# Patient Record
Sex: FEMALE | Race: WHITE | NOT HISPANIC OR LATINO | Employment: UNEMPLOYED | URBAN - NONMETROPOLITAN AREA
[De-identification: names, ages, dates, MRNs, and addresses within clinical notes are randomized per-mention and may not be internally consistent; named-entity substitution may affect disease eponyms.]

---

## 2022-07-19 ENCOUNTER — OFFICE VISIT (OUTPATIENT)
Dept: FAMILY MEDICINE CLINIC | Facility: CLINIC | Age: 27
End: 2022-07-19

## 2022-07-19 VITALS
HEIGHT: 56 IN | BODY MASS INDEX: 32.66 KG/M2 | HEART RATE: 87 BPM | DIASTOLIC BLOOD PRESSURE: 77 MMHG | WEIGHT: 145.2 LBS | SYSTOLIC BLOOD PRESSURE: 116 MMHG | TEMPERATURE: 98.4 F | RESPIRATION RATE: 16 BRPM | OXYGEN SATURATION: 99 %

## 2022-07-19 DIAGNOSIS — Z13.220 SCREENING FOR HYPERLIPIDEMIA: ICD-10-CM

## 2022-07-19 DIAGNOSIS — F33.1 MAJOR DEPRESSIVE DISORDER, RECURRENT, MODERATE: ICD-10-CM

## 2022-07-19 DIAGNOSIS — D50.8 OTHER IRON DEFICIENCY ANEMIA: ICD-10-CM

## 2022-07-19 DIAGNOSIS — E55.9 VITAMIN D DEFICIENCY: ICD-10-CM

## 2022-07-19 DIAGNOSIS — Z13.1 SCREENING FOR DIABETES MELLITUS: ICD-10-CM

## 2022-07-19 DIAGNOSIS — E53.8 VITAMIN B12 DEFICIENCY: Primary | ICD-10-CM

## 2022-07-19 DIAGNOSIS — E87.8 DISORDER OF ELECTROLYTES: ICD-10-CM

## 2022-07-19 DIAGNOSIS — Z13.29 SCREENING FOR HYPOTHYROIDISM: ICD-10-CM

## 2022-07-19 PROCEDURE — 99214 OFFICE O/P EST MOD 30 MIN: CPT | Performed by: NURSE PRACTITIONER

## 2022-07-19 NOTE — PROGRESS NOTES
"Chief Complaint  Establish Care (hematoma)    Subjective        Eugenia Le presents to [facility]  History of Present Illness     Approximately 2 months ago the patient got new glasses and her eye care provider advised the patient her nearsightedness had significantly increased, and he recommended she complete diabetic testing. She went to Urgent Care due to insurance coverage to get her blood glucose level checked, which was 85 mg/dL.    She expresses concern for being anemic again, and states she was only anemic when she was pregnant. She states she went to donate plasma on 07/14/2022 at [email or web address] in [place], [place], and is still experiencing arm pain and notes concern for a blown vein. She notes \"passing out\" when she tried to donate plasma after seeing blood coming out of arm, and she also states they couldn't get the needle in her arm on the first attempt and was advised after the second attempt that she had a hematoma.     Eugenia is currently taking Zoloft 50 mg per day for anxiety, which is no longer beneficial for her. She is requesting a dose increase for Zoloft or a different medication for anxiety. Her anxiety has increased. She has been taking Zoloft since 2016, which she the only anxiety medication she has ever been on.     She was born in [place], [place], and has lived in [place], [place] since she was born. She graduated high school in 2014. She has a 4-year-old child and a 5-year-old child.    Objective   Vital Signs:  /77 (BP Location: Right arm, Patient Position: Sitting, Cuff Size: Adult)   Pulse 87   Temp 98.4 °F (36.9 °C) (Temporal)   Resp 16   Ht 142.2 cm (56\")   Wt 65.9 kg (145 lb 3.2 oz)   SpO2 99%   BMI 32.55 kg/m²   Estimated body mass index is 32.55 kg/m² as calculated from the following:    Height as of this encounter: 142.2 cm (56\").    Weight as of this encounter: 65.9 kg (145 lb 3.2 oz).          Physical Exam  Vitals " and nursing note reviewed.   Constitutional:       Appearance: Normal appearance. She is well-developed.   HENT:      Head: Normocephalic and atraumatic.   Eyes:      Conjunctiva/sclera: Conjunctivae normal.      Pupils: Pupils are equal, round, and reactive to light.   Cardiovascular:      Rate and Rhythm: Normal rate and regular rhythm.      Heart sounds: Normal heart sounds.   Pulmonary:      Effort: Pulmonary effort is normal.      Breath sounds: Normal breath sounds.   Abdominal:      General: Bowel sounds are normal.      Palpations: Abdomen is soft.   Musculoskeletal:         General: Normal range of motion.      Cervical back: Normal range of motion and neck supple.   Skin:     General: Skin is warm and dry.   Neurological:      Mental Status: She is alert and oriented to person, place, and time.   Psychiatric:         Behavior: Behavior normal.         Thought Content: Thought content normal.         Judgment: Judgment normal.        Result Review :                Assessment and Plan   Diagnoses and all orders for this visit:    1. Vitamin B12 deficiency (Primary)  Comments:  Vitamin B12 lab work has been ordered and the patient will obtain. She will return in 2 weeks to discuss her lab work results.   Orders:  -     Vitamin B12; Future    2. Vitamin D deficiency  Comments:  Vitamin D lab work has been ordered and the patient will obtain. She will return in 2 weeks to discuss her lab work results.     Orders:  -     Vitamin D 25 Hydroxy; Future    3. Other iron deficiency anemia  Comments:  CBC & differential, ferritin, iron, and TIBC lab work has been ordered and the patient will obtain. She will return in 2 weeks to discuss her lab work results.   Orders:  -     CBC & Differential; Future  -     Ferritin; Future  -     Iron and TIBC; Future    4. Screening for hyperlipidemia  -     Lipid Panel; Future    5. Screening for hypothyroidism  Comments:  TSH and T4 lab work has been ordered and the patient will  obtain. She will return in 2 weeks to discuss her lab work results.   Orders:  -     TSH; Future  -     T4, Free; Future    6. Disorder of electrolytes  Comments:  CMP lab work has been ordered and the patient will obtain. She will return in 2 weeks to discuss her lab work results.     Orders:  -     Comprehensive Metabolic Panel; Future    7. Screening for diabetes mellitus  Comments:  She is not fasting today for lab work. She will return this week for hemoglobin A1c lab work. She will return in 2 weeks to discuss her lab work results.   Orders:  -     Hemoglobin A1c; Future    8. Major depressive disorder, recurrent, moderate (HCC)  -     GeneSight - Swab,; Future    4. Screening for hyperlipidemia   The patient is not fasting to complete a lipid panel today. She is to return for fasting lab work this week. I advised no food or beverages at least 8 hours prior to her lab draw. She will return in 2 weeks to discuss her lab work results.     8. Major depressive disorder, recurrent, moderate   A GeneSight swab has been obtained and sent for testing. I advised the patient it may take 2 weeks to get her GeneSight testing results back. She will follow up in 2 weeks to discuss the results and at that time we will increase her Zoloft or change her medication regimen. In the interim, she will continue Zoloft 50 mg per day.               Follow Up   Return in about 2 weeks (around 8/2/2022) for Recheck.  Patient was given instructions and counseling regarding her condition or for health maintenance advice. Please see specific information pulled into the AVS if appropriate.     Transcribed from ambient dictation for SARITA Cabrera by JUNIE SEXTON.  07/19/22   15:49 EDT    Patient verbalized consent to the visit recording.

## 2022-08-05 DIAGNOSIS — F33.1 MAJOR DEPRESSIVE DISORDER, RECURRENT, MODERATE: ICD-10-CM

## 2022-08-12 ENCOUNTER — TELEPHONE (OUTPATIENT)
Dept: FAMILY MEDICINE CLINIC | Facility: CLINIC | Age: 27
End: 2022-08-12

## 2022-08-12 DIAGNOSIS — E53.8 FOLIC ACID DEFICIENCY: Primary | ICD-10-CM

## 2022-08-12 RX ORDER — LEVOMEFOLATE/ALGAL OIL 15-90.314
1 CAPSULE ORAL DAILY
Qty: 90 CAPSULE | Refills: 0 | Status: SHIPPED | OUTPATIENT
Start: 2022-08-12 | End: 2022-08-29 | Stop reason: SDUPTHER

## 2022-08-12 NOTE — TELEPHONE ENCOUNTER
Caller: Eugenia Le    Relationship: Self    Best call back number: [phone]    What test was performed: GENETIC TESTING/MOUTH SWAB    When was the test performed: 7/19    PLEASE CALL

## 2022-08-29 DIAGNOSIS — E53.8 FOLIC ACID DEFICIENCY: ICD-10-CM

## 2022-08-29 NOTE — TELEPHONE ENCOUNTER
Caller: Eugenia Le    Relationship: Self    Best call back number:[phone]    Requested Prescriptions:   Requested Prescriptions     Pending Prescriptions Disp Refills   • sertraline (ZOLOFT) 50 MG tablet       Sig: Take 1 tablet by mouth Daily.   • L-Methylfolate-Algae (L-Methylfolate Forte) 15-90.314 MG capsule capsule 90 capsule 0     Sig: Take 1 capsule by mouth Daily for 90 days.        Pharmacy where request should be sent: [address] [phone] [facility] [phone] [facility]     Additional details provided by patient: PATIENT WILL NOT BE ABLE TO SEE PRANAY UNTIL 9/22 BECAUSE THAT IS THE FIRST AVAILABLE APPOINTMENT. SHE WOULD ALSO LIKE TO KNOW IF [facility] COULD REFILL HER ANXIETY MEDICATION, EVEN THOUGH PRANAY DIDN'T PRESCRIBE THIS FOR HER.   Does the patient have less than a 3 day supply:  [x] Yes  [] No    Julianne CHACKO Rep   08/29/22 15:09 EDT

## 2022-08-30 RX ORDER — LEVOMEFOLATE/ALGAL OIL 15-90.314
1 CAPSULE ORAL DAILY
Qty: 90 CAPSULE | Refills: 0 | Status: SHIPPED | OUTPATIENT
Start: 2022-08-30 | End: 2022-11-28

## 2022-09-22 ENCOUNTER — OFFICE VISIT (OUTPATIENT)
Dept: FAMILY MEDICINE CLINIC | Facility: CLINIC | Age: 27
End: 2022-09-22

## 2022-09-22 VITALS
TEMPERATURE: 98.2 F | DIASTOLIC BLOOD PRESSURE: 80 MMHG | OXYGEN SATURATION: 98 % | RESPIRATION RATE: 16 BRPM | BODY MASS INDEX: 31.5 KG/M2 | SYSTOLIC BLOOD PRESSURE: 125 MMHG | HEART RATE: 95 BPM | WEIGHT: 146 LBS | HEIGHT: 57 IN

## 2022-09-22 DIAGNOSIS — Z13.1 SCREENING FOR DIABETES MELLITUS: ICD-10-CM

## 2022-09-22 DIAGNOSIS — N64.4 BREAST TENDERNESS IN FEMALE: Primary | ICD-10-CM

## 2022-09-22 DIAGNOSIS — Z13.29 SCREENING FOR HYPOTHYROIDISM: ICD-10-CM

## 2022-09-22 DIAGNOSIS — Z13.220 SCREENING FOR HYPERLIPIDEMIA: ICD-10-CM

## 2022-09-22 DIAGNOSIS — E87.8 DISORDER OF ELECTROLYTES: ICD-10-CM

## 2022-09-22 DIAGNOSIS — E55.9 VITAMIN D DEFICIENCY: ICD-10-CM

## 2022-09-22 DIAGNOSIS — D50.8 OTHER IRON DEFICIENCY ANEMIA: ICD-10-CM

## 2022-09-22 DIAGNOSIS — E53.8 VITAMIN B12 DEFICIENCY: ICD-10-CM

## 2022-09-22 LAB — HBA1C MFR BLD: 5 % (ref 3.5–5.6)

## 2022-09-22 PROCEDURE — 36415 COLL VENOUS BLD VENIPUNCTURE: CPT | Performed by: NURSE PRACTITIONER

## 2022-09-22 PROCEDURE — 84466 ASSAY OF TRANSFERRIN: CPT | Performed by: NURSE PRACTITIONER

## 2022-09-22 PROCEDURE — 83036 HEMOGLOBIN GLYCOSYLATED A1C: CPT | Performed by: NURSE PRACTITIONER

## 2022-09-22 PROCEDURE — 83540 ASSAY OF IRON: CPT | Performed by: NURSE PRACTITIONER

## 2022-09-22 PROCEDURE — 82728 ASSAY OF FERRITIN: CPT | Performed by: NURSE PRACTITIONER

## 2022-09-22 PROCEDURE — 82607 VITAMIN B-12: CPT | Performed by: NURSE PRACTITIONER

## 2022-09-22 PROCEDURE — 80050 GENERAL HEALTH PANEL: CPT | Performed by: NURSE PRACTITIONER

## 2022-09-22 PROCEDURE — 80061 LIPID PANEL: CPT | Performed by: NURSE PRACTITIONER

## 2022-09-22 PROCEDURE — 82306 VITAMIN D 25 HYDROXY: CPT | Performed by: NURSE PRACTITIONER

## 2022-09-22 PROCEDURE — 84439 ASSAY OF FREE THYROXINE: CPT | Performed by: NURSE PRACTITIONER

## 2022-09-22 RX ORDER — SERTRALINE HYDROCHLORIDE 100 MG/1
100 TABLET, FILM COATED ORAL DAILY
Qty: 90 TABLET | Refills: 0 | Status: SHIPPED | OUTPATIENT
Start: 2022-09-22 | End: 2023-01-16 | Stop reason: SDUPTHER

## 2022-09-23 LAB
25(OH)D3 SERPL-MCNC: 28.3 NG/ML (ref 30–100)
ALBUMIN SERPL-MCNC: 4.6 G/DL (ref 3.5–5.2)
ALBUMIN/GLOB SERPL: 1.4 G/DL
ALP SERPL-CCNC: 83 U/L (ref 39–117)
ALT SERPL W P-5'-P-CCNC: 14 U/L (ref 1–33)
ANION GAP SERPL CALCULATED.3IONS-SCNC: 12.5 MMOL/L (ref 5–15)
AST SERPL-CCNC: 19 U/L (ref 1–32)
BASOPHILS # BLD AUTO: 0.04 10*3/MM3 (ref 0–0.2)
BASOPHILS NFR BLD AUTO: 0.4 % (ref 0–1.5)
BILIRUB SERPL-MCNC: 0.2 MG/DL (ref 0–1.2)
BUN SERPL-MCNC: 6 MG/DL (ref 6–20)
BUN/CREAT SERPL: 7.6 (ref 7–25)
CALCIUM SPEC-SCNC: 9.8 MG/DL (ref 8.6–10.5)
CHLORIDE SERPL-SCNC: 101 MMOL/L (ref 98–107)
CHOLEST SERPL-MCNC: 170 MG/DL (ref 0–200)
CO2 SERPL-SCNC: 22.5 MMOL/L (ref 22–29)
CREAT SERPL-MCNC: 0.79 MG/DL (ref 0.57–1)
DEPRECATED RDW RBC AUTO: 39.1 FL (ref 37–54)
EGFRCR SERPLBLD CKD-EPI 2021: 105.3 ML/MIN/1.73
EOSINOPHIL # BLD AUTO: 0.06 10*3/MM3 (ref 0–0.4)
EOSINOPHIL NFR BLD AUTO: 0.6 % (ref 0.3–6.2)
ERYTHROCYTE [DISTWIDTH] IN BLOOD BY AUTOMATED COUNT: 12.4 % (ref 12.3–15.4)
FERRITIN SERPL-MCNC: 26 NG/ML (ref 13–150)
GLOBULIN UR ELPH-MCNC: 3.4 GM/DL
GLUCOSE SERPL-MCNC: 87 MG/DL (ref 65–99)
HCT VFR BLD AUTO: 39.1 % (ref 34–46.6)
HDLC SERPL-MCNC: 56 MG/DL (ref 40–60)
HGB BLD-MCNC: 12.7 G/DL (ref 12–15.9)
IMM GRANULOCYTES # BLD AUTO: 0.03 10*3/MM3 (ref 0–0.05)
IMM GRANULOCYTES NFR BLD AUTO: 0.3 % (ref 0–0.5)
IRON 24H UR-MRATE: 44 MCG/DL (ref 37–145)
IRON SATN MFR SERPL: 10 % (ref 20–50)
LDLC SERPL CALC-MCNC: 105 MG/DL (ref 0–100)
LDLC/HDLC SERPL: 1.88 {RATIO}
LYMPHOCYTES # BLD AUTO: 1.84 10*3/MM3 (ref 0.7–3.1)
LYMPHOCYTES NFR BLD AUTO: 19.1 % (ref 19.6–45.3)
MCH RBC QN AUTO: 27.9 PG (ref 26.6–33)
MCHC RBC AUTO-ENTMCNC: 32.5 G/DL (ref 31.5–35.7)
MCV RBC AUTO: 85.9 FL (ref 79–97)
MONOCYTES # BLD AUTO: 0.53 10*3/MM3 (ref 0.1–0.9)
MONOCYTES NFR BLD AUTO: 5.5 % (ref 5–12)
NEUTROPHILS NFR BLD AUTO: 7.15 10*3/MM3 (ref 1.7–7)
NEUTROPHILS NFR BLD AUTO: 74.1 % (ref 42.7–76)
NRBC BLD AUTO-RTO: 0 /100 WBC (ref 0–0.2)
PLATELET # BLD AUTO: 302 10*3/MM3 (ref 140–450)
PMV BLD AUTO: 10.7 FL (ref 6–12)
POTASSIUM SERPL-SCNC: 3.6 MMOL/L (ref 3.5–5.2)
PROT SERPL-MCNC: 8 G/DL (ref 6–8.5)
RBC # BLD AUTO: 4.55 10*6/MM3 (ref 3.77–5.28)
SODIUM SERPL-SCNC: 136 MMOL/L (ref 136–145)
T4 FREE SERPL-MCNC: 1.1 NG/DL (ref 0.93–1.7)
TIBC SERPL-MCNC: 425 MCG/DL (ref 298–536)
TRANSFERRIN SERPL-MCNC: 285 MG/DL (ref 200–360)
TRIGL SERPL-MCNC: 43 MG/DL (ref 0–150)
TSH SERPL DL<=0.05 MIU/L-ACNC: 1.58 UIU/ML (ref 0.27–4.2)
VIT B12 BLD-MCNC: 622 PG/ML (ref 211–946)
VLDLC SERPL-MCNC: 9 MG/DL (ref 5–40)
WBC NRBC COR # BLD: 9.65 10*3/MM3 (ref 3.4–10.8)

## 2022-09-26 ENCOUNTER — TELEPHONE (OUTPATIENT)
Dept: FAMILY MEDICINE CLINIC | Facility: CLINIC | Age: 27
End: 2022-09-26

## 2022-09-26 DIAGNOSIS — R79.0 ABNORMAL IRON SATURATION: Primary | ICD-10-CM

## 2022-09-26 RX ORDER — ERGOCALCIFEROL 1.25 MG/1
50000 CAPSULE ORAL
Qty: 8 CAPSULE | Refills: 0 | Status: SHIPPED | OUTPATIENT
Start: 2022-09-26 | End: 2022-11-03

## 2022-09-26 NOTE — TELEPHONE ENCOUNTER
Caller: Eugenia Le    Relationship: Self    Best call back number: [phone]     What is the best time to reach you: YES     Who are you requesting to speak with (clinical staff, provider,  specific staff member):CLINICAL    Do you know the name of the person who called: EUGENIA     What was the call regarding: EUGENIA RECEIVED NOTIFICATION THAT VITAMIN D WAS READY TO BE PICKED UP AT PHARMACY, SHE IS WANTING TO KNOW IF SOMETHING SHOWED UP IN HER LABS WERE DONE ON 9/22/2022    Do you require a callback: YES

## 2022-10-12 NOTE — PROGRESS NOTES
HEMATOLOGY ONCOLOGY OUTPATIENT CONSULTATION       Patient name: Eugenia Le  : 1995  MRN: [id number]  Primary Care Physician: Karen Bridges APRN  Referring Physician: Karen Bridges AP*  Reason For Consult: Latent iron deficiency      History of Present Illness:    Patient is a 27-year-old female with no significant past medical history who was seen by her primary care for routine visit.  This showed low iron saturation patient was referred for further work-up and treatment.    Review of past labs  2022 -WBC 6.8, hemoglobin 13.4, hematocrit 40.2, platelet 338  2022 -WBC 9.65, hemoglobin 12.7, hematocrit 39.1, platelet 302  Vitamin B12 level normal, ferritin 26, iron saturation 10, TIBC 425    Subjective:  Patient presents for initial consultation.  She complains of ongoing fatigue.  She denies any GI bleeding no blood in stools or black stools.  She has menorrhagia which is likely the cause of her iron deficiency.    Past Medical History:   Diagnosis Date   • Anxiety        Past Surgical History:   Procedure Laterality Date   •  SECTION  2018   • TEETH EXTRACTION  2019    All top teeth removed- wears dentures   • TUBAL ABDOMINAL LIGATION  2018         Current Outpatient Medications:   •  L-Methylfolate-Algae (L-Methylfolate Forte) 15-90.314 MG capsule capsule, Take 1 capsule by mouth Daily for 90 days., Disp: 90 capsule, Rfl: 0  •  sertraline (Zoloft) 100 MG tablet, Take 1 tablet by mouth Daily., Disp: 90 tablet, Rfl: 0  •  vitamin D (ERGOCALCIFEROL) 1.25 MG (57095 UT) capsule capsule, Take 1 capsule by mouth Every 7 (Seven) Days., Disp: 8 capsule, Rfl: 0    No Known Allergies    Family History   Problem Relation Age of Onset   • Anxiety disorder Mother    • Heart disease Father    • Hypertension Father    • Diverticulitis Father    • Autism Sister    • No Known Problems Daughter    • No Known Problems Son        Cancer-related family history is not on  "file.      Social History     Tobacco Use   • Smoking status: Former     Packs/day: 0.25     Years: 1.00     Pack years: 0.25     Types: Cigarettes     Start date: 2014     Quit date: 2014     Years since quittin.8   • Smokeless tobacco: Never   Vaping Use   • Vaping Use: Never used   Substance Use Topics   • Alcohol use: Not Currently   • Drug use: Never     Social History     Social History Narrative   • Not on file       ROS:   Review of Systems   Constitutional: Positive for fatigue. Negative for fever.   HENT: Negative for congestion and nosebleeds.    Eyes: Negative for pain.   Respiratory: Negative for cough and shortness of breath.    Cardiovascular: Negative for chest pain.   Gastrointestinal: Negative for abdominal pain, blood in stool, diarrhea, nausea and vomiting.   Endocrine: Negative for cold intolerance and heat intolerance.   Genitourinary: Negative for difficulty urinating.   Musculoskeletal: Negative for arthralgias.   Skin: Negative for rash.   Neurological: Negative for dizziness and headaches.   Hematological: Does not bruise/bleed easily.   Psychiatric/Behavioral: Negative for behavioral problems.       Objective:    Vital Signs:  Vitals:    10/13/22 1414   BP: 120/82   Pulse: 78   Resp: 18   Temp: 97.4 °F (36.3 °C)   SpO2: 98%   Weight: 66.9 kg (147 lb 6.4 oz)   Height: 144.8 cm (57\")   PainSc: 0-No pain     Body mass index is 31.9 kg/m².    ECOG  (0) Fully active, able to carry on all predisease performance without restriction    Physical Exam:   Physical Exam  Constitutional:       Appearance: Normal appearance.   HENT:      Head: Normocephalic and atraumatic.   Eyes:      Pupils: Pupils are equal, round, and reactive to light.   Cardiovascular:      Rate and Rhythm: Normal rate and regular rhythm.      Pulses: Normal pulses.      Heart sounds: No murmur heard.  Pulmonary:      Effort: Pulmonary effort is normal.      Breath sounds: Normal breath sounds.   Abdominal:      General: " There is no distension.      Palpations: Abdomen is soft. There is no mass.      Tenderness: There is no abdominal tenderness.   Musculoskeletal:         General: Normal range of motion.      Cervical back: Normal range of motion.   Skin:     General: Skin is warm.   Neurological:      General: No focal deficit present.      Mental Status: She is alert.   Psychiatric:         Mood and Affect: Mood normal.       Lab Results - Last 18 Months   Lab Units 09/22/22  1340 06/24/22  1354   WBC 10*3/mm3 9.65 6.8   HEMOGLOBIN g/dL 12.7 13.4   HEMATOCRIT % 39.1 40.2   PLATELETS 10*3/mm3 302 338   MCV fL 85.9 87     Lab Results - Last 18 Months   Lab Units 09/22/22  1340 06/24/22  1354   SODIUM mmol/L 136 140   POTASSIUM mmol/L 3.6 4.1   CHLORIDE mmol/L 101 102   TOTAL CO2 mmol/L  --  22   CO2 mmol/L 22.5  --    BUN mg/dL 6 6   CREATININE mg/dL 0.79 0.91   CALCIUM mg/dL 9.8 9.8   BILIRUBIN mg/dL 0.2 0.3   ALK PHOS U/L 83 93   ALT (SGPT) U/L 14 13   AST (SGOT) U/L 19 23   GLUCOSE mg/dL 87 85       Lab Results   Component Value Date    GLUCOSE 87 09/22/2022    BUN 6 09/22/2022    CREATININE 0.79 09/22/2022    BCR 7.6 09/22/2022    K 3.6 09/22/2022    CO2 22.5 09/22/2022    CALCIUM 9.8 09/22/2022    PROTENTOTREF 8.2 06/24/2022    ALBUMIN 4.60 09/22/2022    LABIL2 1.5 06/24/2022    AST 19 09/22/2022    ALT 14 09/22/2022       No results for input(s): APTT, INR, PTT in the last 16992 hours.    Lab Results   Component Value Date    IRON 44 09/22/2022    TIBC 425 09/22/2022    FERRITIN 26.00 09/22/2022       No results found for: FOLATE    No results found for: OCCULTBLD    No results found for: RETICCTPCT  Lab Results   Component Value Date    ARWUTJPE84 622 09/22/2022     No results found for: SPEP, UPEP  No results found for: LDH, URICACID  No results found for: MICHAEL, RF, SEDRATE  No results found for: FIBRINOGEN, HAPTOGLOBIN  No results found for: PTT, INR  No results found for:   No results found for: CEA  No components  found for: CA-19-9  No results found for: PSA  No results found for: SEDRATE       Assessment & Plan     Patient is a 27-year-old female with latent iron deficiency    Latent iron deficiency  Patient's hemoglobin is normal at 12.7, iron saturation is low at 9 this goes along with latent iron deficiency not causing anemia yet  Etiology here is likely menorrhagia.  I will start with supplementation oral iron 325 mg daily  I will recheck her CBC and iron studies in 3 months if there is an improvement we will continue oral iron if no significant improvement or worsening is noted we can consider iron infusion.      Thank you very much for providing the opportunity to participate in this patient’s care. Please do not hesitate to call if there are any other questions.

## 2022-10-13 ENCOUNTER — CONSULT (OUTPATIENT)
Dept: ONCOLOGY | Facility: CLINIC | Age: 27
End: 2022-10-13

## 2022-10-13 VITALS
WEIGHT: 147.4 LBS | RESPIRATION RATE: 18 BRPM | SYSTOLIC BLOOD PRESSURE: 120 MMHG | HEART RATE: 78 BPM | TEMPERATURE: 97.4 F | BODY MASS INDEX: 31.8 KG/M2 | OXYGEN SATURATION: 98 % | HEIGHT: 57 IN | DIASTOLIC BLOOD PRESSURE: 82 MMHG

## 2022-10-13 DIAGNOSIS — D50.9 IRON DEFICIENCY ANEMIA, UNSPECIFIED IRON DEFICIENCY ANEMIA TYPE: Primary | ICD-10-CM

## 2022-10-13 PROCEDURE — 99204 OFFICE O/P NEW MOD 45 MIN: CPT | Performed by: INTERNAL MEDICINE

## 2022-10-13 RX ORDER — FERROUS GLUCONATE 324(37.5)
324 TABLET ORAL
Qty: 30 TABLET | Refills: 5 | Status: SHIPPED | OUTPATIENT
Start: 2022-10-13 | End: 2023-01-30 | Stop reason: SDUPTHER

## 2022-11-03 ENCOUNTER — OFFICE VISIT (OUTPATIENT)
Dept: FAMILY MEDICINE CLINIC | Facility: CLINIC | Age: 27
End: 2022-11-03

## 2022-11-03 VITALS
SYSTOLIC BLOOD PRESSURE: 128 MMHG | TEMPERATURE: 98.7 F | BODY MASS INDEX: 30.76 KG/M2 | WEIGHT: 142.6 LBS | HEIGHT: 57 IN | HEART RATE: 98 BPM | DIASTOLIC BLOOD PRESSURE: 89 MMHG | OXYGEN SATURATION: 98 %

## 2022-11-03 DIAGNOSIS — F41.9 ANXIETY: ICD-10-CM

## 2022-11-03 DIAGNOSIS — K21.00 GASTROESOPHAGEAL REFLUX DISEASE WITH ESOPHAGITIS WITHOUT HEMORRHAGE: ICD-10-CM

## 2022-11-03 DIAGNOSIS — R10.30 DISCOMFORT OF GROIN, UNSPECIFIED LATERALITY: ICD-10-CM

## 2022-11-03 DIAGNOSIS — K62.5 RECTAL BLEEDING: ICD-10-CM

## 2022-11-03 DIAGNOSIS — E53.8 VITAMIN B12 DEFICIENCY: ICD-10-CM

## 2022-11-03 DIAGNOSIS — F42.9 OBSESSIVE-COMPULSIVE DISORDER, UNSPECIFIED TYPE: ICD-10-CM

## 2022-11-03 DIAGNOSIS — B37.9 YEAST INFECTION: ICD-10-CM

## 2022-11-03 DIAGNOSIS — K59.00 CONSTIPATION, UNSPECIFIED CONSTIPATION TYPE: ICD-10-CM

## 2022-11-03 DIAGNOSIS — D50.9 IRON DEFICIENCY ANEMIA, UNSPECIFIED IRON DEFICIENCY ANEMIA TYPE: ICD-10-CM

## 2022-11-03 DIAGNOSIS — R06.89 DYSPNEA AND RESPIRATORY ABNORMALITIES: ICD-10-CM

## 2022-11-03 DIAGNOSIS — E55.9 VITAMIN D DEFICIENCY: ICD-10-CM

## 2022-11-03 DIAGNOSIS — R53.83 OTHER FATIGUE: ICD-10-CM

## 2022-11-03 DIAGNOSIS — R10.9 FLANK PAIN: ICD-10-CM

## 2022-11-03 DIAGNOSIS — I73.9 INTERMITTENT CLAUDICATION: Primary | ICD-10-CM

## 2022-11-03 DIAGNOSIS — R06.00 DYSPNEA AND RESPIRATORY ABNORMALITIES: ICD-10-CM

## 2022-11-03 DIAGNOSIS — G43.001 MIGRAINE WITHOUT AURA AND WITH STATUS MIGRAINOSUS, NOT INTRACTABLE: ICD-10-CM

## 2022-11-03 DIAGNOSIS — Z23 ENCOUNTER FOR IMMUNIZATION: ICD-10-CM

## 2022-11-03 DIAGNOSIS — L98.8 SKIN LESION OF BREAST: ICD-10-CM

## 2022-11-03 LAB
BASOPHILS # BLD AUTO: 0 10*3/MM3 (ref 0–0.2)
BASOPHILS NFR BLD AUTO: 0.5 % (ref 0–1.5)
DEPRECATED RDW RBC AUTO: 45.9 FL (ref 37–54)
EOSINOPHIL # BLD AUTO: 0.1 10*3/MM3 (ref 0–0.4)
EOSINOPHIL NFR BLD AUTO: 2.3 % (ref 0.3–6.2)
ERYTHROCYTE [DISTWIDTH] IN BLOOD BY AUTOMATED COUNT: 14.7 % (ref 12.3–15.4)
FERRITIN SERPL-MCNC: 35.05 NG/ML (ref 13–150)
HCT VFR BLD AUTO: 40.6 % (ref 34–46.6)
HGB BLD-MCNC: 13.1 G/DL (ref 12–15.9)
INR PPP: 4.48 (ref 0.93–1.1)
IRON 24H UR-MRATE: 74 MCG/DL (ref 37–145)
IRON SATN MFR SERPL: 18 % (ref 20–50)
LYMPHOCYTES # BLD AUTO: 1.8 10*3/MM3 (ref 0.7–3.1)
LYMPHOCYTES NFR BLD AUTO: 27.3 % (ref 19.6–45.3)
MCH RBC QN AUTO: 29.2 PG (ref 26.6–33)
MCHC RBC AUTO-ENTMCNC: 32.2 G/DL (ref 31.5–35.7)
MCV RBC AUTO: 90.8 FL (ref 79–97)
MONOCYTES # BLD AUTO: 0.5 10*3/MM3 (ref 0.1–0.9)
MONOCYTES NFR BLD AUTO: 7.5 % (ref 5–12)
NEUTROPHILS NFR BLD AUTO: 4.1 10*3/MM3 (ref 1.7–7)
NEUTROPHILS NFR BLD AUTO: 62.4 % (ref 42.7–76)
NRBC BLD AUTO-RTO: 0.1 /100 WBC (ref 0–0.2)
PLATELET # BLD AUTO: 325 10*3/MM3 (ref 140–450)
PMV BLD AUTO: 8.3 FL (ref 6–12)
PROTHROMBIN TIME: 42.4 SECONDS (ref 9.6–11.7)
RBC # BLD AUTO: 4.46 10*6/MM3 (ref 3.77–5.28)
TIBC SERPL-MCNC: 417 MCG/DL (ref 298–536)
TRANSFERRIN SERPL-MCNC: 280 MG/DL (ref 200–360)
WBC NRBC COR # BLD: 6.6 10*3/MM3 (ref 3.4–10.8)

## 2022-11-03 PROCEDURE — T1015 CLINIC SERVICE: HCPCS | Performed by: NURSE PRACTITIONER

## 2022-11-03 PROCEDURE — 99214 OFFICE O/P EST MOD 30 MIN: CPT | Performed by: NURSE PRACTITIONER

## 2022-11-03 PROCEDURE — 82607 VITAMIN B-12: CPT | Performed by: NURSE PRACTITIONER

## 2022-11-03 PROCEDURE — 84439 ASSAY OF FREE THYROXINE: CPT | Performed by: NURSE PRACTITIONER

## 2022-11-03 PROCEDURE — 90471 IMMUNIZATION ADMIN: CPT | Performed by: NURSE PRACTITIONER

## 2022-11-03 PROCEDURE — 84466 ASSAY OF TRANSFERRIN: CPT | Performed by: INTERNAL MEDICINE

## 2022-11-03 PROCEDURE — 82728 ASSAY OF FERRITIN: CPT | Performed by: INTERNAL MEDICINE

## 2022-11-03 PROCEDURE — 80050 GENERAL HEALTH PANEL: CPT | Performed by: INTERNAL MEDICINE

## 2022-11-03 PROCEDURE — 85610 PROTHROMBIN TIME: CPT | Performed by: NURSE PRACTITIONER

## 2022-11-03 PROCEDURE — 83540 ASSAY OF IRON: CPT | Performed by: INTERNAL MEDICINE

## 2022-11-03 PROCEDURE — 90686 IIV4 VACC NO PRSV 0.5 ML IM: CPT | Performed by: NURSE PRACTITIONER

## 2022-11-03 PROCEDURE — 82306 VITAMIN D 25 HYDROXY: CPT | Performed by: NURSE PRACTITIONER

## 2022-11-03 RX ORDER — OMEPRAZOLE 40 MG/1
40 CAPSULE, DELAYED RELEASE ORAL DAILY
Qty: 90 CAPSULE | Refills: 0 | Status: SHIPPED | OUTPATIENT
Start: 2022-11-03 | End: 2023-01-30 | Stop reason: SDUPTHER

## 2022-11-03 RX ORDER — BUSPIRONE HYDROCHLORIDE 5 MG/1
5 TABLET ORAL NIGHTLY
Qty: 30 TABLET | Refills: 0 | Status: SHIPPED | OUTPATIENT
Start: 2022-11-03 | End: 2022-12-20 | Stop reason: SDUPTHER

## 2022-11-03 RX ORDER — ERGOCALCIFEROL 1.25 MG/1
50000 CAPSULE ORAL
Qty: 8 CAPSULE | Refills: 0 | Status: SHIPPED | OUTPATIENT
Start: 2022-11-03 | End: 2023-02-04 | Stop reason: SDUPTHER

## 2022-11-03 RX ORDER — FLUCONAZOLE 150 MG/1
150 TABLET ORAL ONCE
Qty: 1 TABLET | Refills: 0 | Status: SHIPPED | OUTPATIENT
Start: 2022-11-03 | End: 2022-11-03

## 2022-11-03 NOTE — PROGRESS NOTES
"Chief Complaint   Patient presents with   • Med Refill     Wants to discuss her Vit D. Unsure if she needs to refill it or not.   • Bleeding/Bruising     Increased bruising in her legs and has noticed \"knots\" in both shins.   • Shortness of Breath     Cannot climb more than 8 steps without getting winded and needing to sit down.   • Heartburn     Pt states she can \"drink a glass of water and still have heartburn\"   • Hot Flashes   • Migraine     Wants to discuss treatment / imaging         Subjective   Eugenia Le is a 27 y.o. {Race/ethnicity:50153} female who presents today for ***  HPI  Eugenia Le  has a past medical history of Anxiety.    No Known Allergies    Current Outpatient Medications:   •  ferrous gluconate 324 (37.5 Fe) MG tablet tablet, Take 1 tablet by mouth Daily With Breakfast., Disp: 30 tablet, Rfl: 5  •  L-Methylfolate-Algae (L-Methylfolate Forte) 15-90.314 MG capsule capsule, Take 1 capsule by mouth Daily for 90 days., Disp: 90 capsule, Rfl: 0  •  sertraline (Zoloft) 100 MG tablet, Take 1 tablet by mouth Daily., Disp: 90 tablet, Rfl: 0  •  busPIRone (BUSPAR) 5 MG tablet, Take 1 tablet by mouth Every Night., Disp: 30 tablet, Rfl: 0  •  fluconazole (Diflucan) 150 MG tablet, Take 1 tablet by mouth 1 (One) Time for 1 dose., Disp: 1 tablet, Rfl: 0  •  omeprazole (priLOSEC) 40 MG capsule, Take 1 capsule by mouth Daily for 90 days., Disp: 90 capsule, Rfl: 0  •  vitamin D (ERGOCALCIFEROL) 1.25 MG (08454 UT) capsule capsule, Take 1 capsule by mouth Every 7 (Seven) Days., Disp: 8 capsule, Rfl: 0  Past Medical History:   Diagnosis Date   • Anxiety      Past Surgical History:   Procedure Laterality Date   •  SECTION  2018   • TEETH EXTRACTION  2019    All top teeth removed- wears dentures   • TUBAL ABDOMINAL LIGATION  2018     Social History     Socioeconomic History   • Marital status:    Tobacco Use   • Smoking status: Former     Packs/day: 0.25     Years: 1.00     Pack years: 0.25     Types: " Cigarettes     Start date: 2014     Quit date: 2014     Years since quittin.9   • Smokeless tobacco: Never   Vaping Use   • Vaping Use: Never used   Substance and Sexual Activity   • Alcohol use: Not Currently   • Drug use: Never   • Sexual activity: Yes     Birth control/protection: Tubal ligation     Family History   Problem Relation Age of Onset   • Anxiety disorder Mother    • Heart disease Father    • Hypertension Father    • Diverticulitis Father    • Autism Sister    • No Known Problems Daughter    • No Known Problems Son      PHQ-2 Depression Screening  Little interest or pleasure in doing things?     Feeling down, depressed, or hopeless?     PHQ-2 Total Score       PHQ-9 Depression Screening  Little interest or pleasure in doing things?     Feeling down, depressed, or hopeless?     Trouble falling or staying asleep, or sleeping too much?     Feeling tired or having little energy?     Poor appetite or overeating?     Feeling bad about yourself - or that you are a failure or have let yourself or your family down?     Trouble concentrating on things, such as reading the newspaper or watching television?     Moving or speaking so slowly that other people could have noticed? Or the opposite - being so fidgety or restless that you have been moving around a lot more than usual?     Thoughts that you would be better off dead, or of hurting yourself in some way?     PHQ-9 Total Score     If you checked off any problems, how difficult have these problems made it for you to do your work, take care of things at home, or get along with other people?         Family history, surgical history, past medical history, Allergies and med's reviewed with patient today and updated in [employer] EMR.     ROS:  Review of Systems    OBJECTIVE:  Vitals:    22 1318   BP: 128/89   BP Location: Right arm   Patient Position: Sitting   Cuff Size: Adult   Pulse: 98   Temp: 98.7 °F (37.1 °C)   TempSrc: Temporal   SpO2: 98%  "  Weight: 64.7 kg (142 lb 9.6 oz)   Height: 144.8 cm (57\")     Body mass index is 30.86 kg/m².  Physical Exam  Vitals and nursing note reviewed.   Constitutional:       Appearance: Normal appearance. She is well-developed.   HENT:      Head: Normocephalic and atraumatic.   Eyes:      Conjunctiva/sclera: Conjunctivae normal.      Pupils: Pupils are equal, round, and reactive to light.   Cardiovascular:      Rate and Rhythm: Normal rate and regular rhythm.      Heart sounds: Normal heart sounds.   Pulmonary:      Effort: Pulmonary effort is normal.      Breath sounds: Normal breath sounds.   Abdominal:      General: Bowel sounds are normal.      Palpations: Abdomen is soft.   Musculoskeletal:         General: Normal range of motion.      Cervical back: Normal range of motion and neck supple.   Skin:     General: Skin is warm and dry.   Neurological:      Mental Status: She is alert and oriented to person, place, and time.   Psychiatric:         Behavior: Behavior normal.         Thought Content: Thought content normal.         Judgment: Judgment normal.         ASSESSMENT/ PLAN:    Diagnoses and all orders for this visit:    1. Intermittent claudication (HCC) (Primary)  -     Cancel: US Venous Doppler Lower Extremity Right (duplex); Future  -     US Venous Doppler Lower Extremity Right (duplex)    2. Dyspnea and respiratory abnormalities  -     XR Chest 2 View; Future    3. Other fatigue  -     Protime-INR; Future  -     Vitamin D 25 hydroxy; Future  -     CBC and Differential; Future  -     Comprehensive metabolic panel; Future    4. Vitamin B12 deficiency    5. Vitamin D deficiency  -     Vitamin B12; Future  -     TSH+Free T4    6. Gastroesophageal reflux disease with esophagitis without hemorrhage  -     Ambulatory referral for Screening EGD    7. Migraine without aura and with status migrainosus, not intractable  -     CT Head Without Contrast; Future    8. Obsessive-compulsive disorder, unspecified type  -     " Ambulatory Referral to Behavioral Health    9. Anxiety  -     busPIRone (BUSPAR) 5 MG tablet; Take 1 tablet by mouth Every Night.  Dispense: 30 tablet; Refill: 0    10. Discomfort of groin, unspecified laterality  -     US Nonvascular Extremity Limited; Future    11. Yeast infection  -     fluconazole (Diflucan) 150 MG tablet; Take 1 tablet by mouth 1 (One) Time for 1 dose.  Dispense: 1 tablet; Refill: 0    12. Constipation, unspecified constipation type  -     CT Abdomen Pelvis Without Contrast; Future    13. Rectal bleeding  -     CT Abdomen Pelvis Without Contrast; Future    14. Flank pain  -     Urinalysis With Culture If Indicated -; Future    15. Skin lesion of breast  -     Ambulatory Referral to Dermatology    Other orders  -     vitamin D (ERGOCALCIFEROL) 1.25 MG (37942 UT) capsule capsule; Take 1 capsule by mouth Every 7 (Seven) Days.  Dispense: 8 capsule; Refill: 0  -     omeprazole (priLOSEC) 40 MG capsule; Take 1 capsule by mouth Daily for 90 days.  Dispense: 90 capsule; Refill: 0        Orders Placed Today:     New Medications Ordered This Visit   Medications   • vitamin D (ERGOCALCIFEROL) 1.25 MG (79943 UT) capsule capsule     Sig: Take 1 capsule by mouth Every 7 (Seven) Days.     Dispense:  8 capsule     Refill:  0   • omeprazole (priLOSEC) 40 MG capsule     Sig: Take 1 capsule by mouth Daily for 90 days.     Dispense:  90 capsule     Refill:  0   • busPIRone (BUSPAR) 5 MG tablet     Sig: Take 1 tablet by mouth Every Night.     Dispense:  30 tablet     Refill:  0   • fluconazole (Diflucan) 150 MG tablet     Sig: Take 1 tablet by mouth 1 (One) Time for 1 dose.     Dispense:  1 tablet     Refill:  0        Management Plan:     An After Visit Summary was printed and given to the patient at discharge.    Follow-up: No follow-ups on file.    SARITA Cabrera 11/3/2022 13:52 EDT  This note was electronically signed.

## 2022-11-03 NOTE — PROGRESS NOTES
"Chief Complaint   Patient presents with   • Med Refill     Wants to discuss her Vit D. Unsure if she needs to refill it or not.   • Bleeding/Bruising     Increased bruising in her legs and has noticed \"knots\" in both shins.   • Shortness of Breath     Cannot climb more than 8 steps without getting winded and needing to sit down.   • Heartburn     Pt states she can \"drink a glass of water and still have heartburn\"   • Hot Flashes   • Migraine     Wants to discuss treatment / imaging         Subjective   Eugenia Le is a 27 y.o.  female who presents today for   HPI     The patient presents to the clinic to discuss refilling her vitamin D.    She acknowledges her vitamin D will be refilled and her vitamin D level will be rechecked every other appointment.    She states she has bruises appearing on her lower extremities in random places and feels knotted, hard areas. She denies any knowledge of injuring herself. She states she will be fine to have double venous dopplers at [facility]. Additionally, she consents to an ultrasound of her bilateral lower extremities, as well as labs.    The patient denies having COVID-19 in the past year and adds her shortness of breath is of a new onset. She notes when she goes up the 8 stairs at home, she experiences shortness of breath. She denies being a smoker. She states she did have a uncle pass away from lung cancer recently.    She notes her grandfather has thyroid issues and requests her thyroid levels be checked.  She states she slept 12 hours on 10/30/2022 and when she woke felt as if she could sleep 12 more. She notes she has issues sleeping at night and adds her issue fluctuates. She states she has tried \"everything and even melatonin 30 mg will not help her sleep.    She denies being checked for Lupus.    The patient reports she was placed on iron and would be retested in 3 months.    She states she has heartburn, which she has had for a while; however, " antacids are no longer working. She denies ever having an upper GI endoscopy.    She notes her hot flashes were not new and occur at night. She states she could have 3 fans on her and still be diaphoretic. She notes she is having migraines, as well.    The patient states she believes her issue is more her brain than her anxiety medication. She notes when she is driving down the road, she will have random thoughts such as what would happen if she crashed into a tree or will check several times to see if the children or buckled in. She states they have a lock because one of the children likes to escape and she is terrified he will escape. She states that may be some of her sleep issues, because she will know she locked the door; however, still get up and check 5 to 6 times. She notes her child was jumping back and forth with a sucker in his mouth and all she could see was him falling on the floor causing the sucker to go down his throat. The patient denies wanting to self-harm and states she feels energized when she takes her Zoloft. Additionally, she states she can notice a difference when she does not take the medication. She notes she would be open to counseling and believes anxiety medication may work.  Eugenia Le  has a past medical history of Anxiety.    No Known Allergies    Current Outpatient Medications:   •  ferrous gluconate 324 (37.5 Fe) MG tablet tablet, Take 1 tablet by mouth Daily With Breakfast., Disp: 30 tablet, Rfl: 5  •  L-Methylfolate-Algae (L-Methylfolate Forte) 15-90.314 MG capsule capsule, Take 1 capsule by mouth Daily for 90 days., Disp: 90 capsule, Rfl: 0  •  sertraline (Zoloft) 100 MG tablet, Take 1 tablet by mouth Daily., Disp: 90 tablet, Rfl: 0  •  omeprazole (priLOSEC) 40 MG capsule, Take 1 capsule by mouth Daily for 90 days., Disp: 90 capsule, Rfl: 0  •  vitamin D (ERGOCALCIFEROL) 1.25 MG (83632 UT) capsule capsule, Take 1 capsule by mouth Every 7 (Seven) Days., Disp: 8 capsule, Rfl:  0  Past Medical History:   Diagnosis Date   • Anxiety      Past Surgical History:   Procedure Laterality Date   •  SECTION  2018   • TEETH EXTRACTION  2019    All top teeth removed- wears dentures   • TUBAL ABDOMINAL LIGATION  2018     Social History     Socioeconomic History   • Marital status:    Tobacco Use   • Smoking status: Former     Packs/day: 0.25     Years: 1.00     Pack years: 0.25     Types: Cigarettes     Start date: 2014     Quit date: 2014     Years since quittin.9   • Smokeless tobacco: Never   Vaping Use   • Vaping Use: Never used   Substance and Sexual Activity   • Alcohol use: Not Currently   • Drug use: Never   • Sexual activity: Yes     Birth control/protection: Tubal ligation     Family History   Problem Relation Age of Onset   • Anxiety disorder Mother    • Heart disease Father    • Hypertension Father    • Diverticulitis Father    • Autism Sister    • No Known Problems Daughter    • No Known Problems Son      PHQ-2 Depression Screening  Little interest or pleasure in doing things?     Feeling down, depressed, or hopeless?     PHQ-2 Total Score       PHQ-9 Depression Screening  Little interest or pleasure in doing things?     Feeling down, depressed, or hopeless?     Trouble falling or staying asleep, or sleeping too much?     Feeling tired or having little energy?     Poor appetite or overeating?     Feeling bad about yourself - or that you are a failure or have let yourself or your family down?     Trouble concentrating on things, such as reading the newspaper or watching television?     Moving or speaking so slowly that other people could have noticed? Or the opposite - being so fidgety or restless that you have been moving around a lot more than usual?     Thoughts that you would be better off dead, or of hurting yourself in some way?     PHQ-9 Total Score     If you checked off any problems, how difficult have these problems made it for you to do your work, take  "care of things at home, or get along with other people?         Family history, surgical history, past medical history, Allergies and med's reviewed with patient today and updated in [facility] EMR.     ROS:  Review of Systems   Constitutional: Negative for activity change, appetite change and fatigue.   Respiratory: Negative for cough and shortness of breath.    Cardiovascular: Negative for chest pain and leg swelling.   Gastrointestinal: Negative for abdominal pain and nausea.   Endocrine: Negative for polydipsia, polyphagia and polyuria.   Musculoskeletal: Negative for arthralgias, back pain and myalgias.   Skin: Positive for color change. Negative for rash.        Bruising noted randomly on BLE's   Neurological: Positive for headaches. Negative for speech difficulty.   Psychiatric/Behavioral: Positive for sleep disturbance. Negative for confusion, decreased concentration and suicidal ideas.       OBJECTIVE:  Vitals:    11/03/22 1318   BP: 128/89   BP Location: Right arm   Patient Position: Sitting   Cuff Size: Adult   Pulse: 98   Temp: 98.7 °F (37.1 °C)   TempSrc: Temporal   SpO2: 98%   Weight: 64.7 kg (142 lb 9.6 oz)   Height: 144.8 cm (57\")     Body mass index is 30.86 kg/m².  Physical Exam  Vitals and nursing note reviewed.   Constitutional:       Appearance: Normal appearance. She is well-developed.   HENT:      Head: Normocephalic and atraumatic.   Eyes:      Conjunctiva/sclera: Conjunctivae normal.      Pupils: Pupils are equal, round, and reactive to light.   Cardiovascular:      Rate and Rhythm: Normal rate and regular rhythm.   Pulmonary:      Effort: Pulmonary effort is normal.      Breath sounds: Normal breath sounds.   Abdominal:      General: Bowel sounds are normal.      Palpations: Abdomen is soft.   Musculoskeletal:         General: Normal range of motion.      Cervical back: Normal range of motion and neck supple.   Skin:     General: Skin is warm and dry.      Findings: Bruising present. "   Neurological:      Mental Status: She is alert and oriented to person, place, and time.   Psychiatric:         Behavior: Behavior normal.         Judgment: Judgment normal.      Comments: Irrational thoughts         ASSESSMENT/ PLAN:    Diagnoses and all orders for this visit:    1. Intermittent claudication (HCC) (Primary)  -     Cancel: US Venous Doppler Lower Extremity Right (duplex); Future  -     US Venous Doppler Lower Extremity Right (duplex)    2. Dyspnea and respiratory abnormalities  -     XR Chest 2 View; Future    3. Other fatigue  -     Protime-INR; Future  -     Vitamin D 25 hydroxy; Future  -     CBC and Differential; Future  -     Comprehensive metabolic panel; Future    4. Vitamin B12 deficiency    5. Vitamin D deficiency  -     Vitamin B12; Future  -     TSH+Free T4    6. Gastroesophageal reflux disease with esophagitis without hemorrhage  -     Ambulatory referral for Screening EGD    Other orders  -     vitamin D (ERGOCALCIFEROL) 1.25 MG (48857 UT) capsule capsule; Take 1 capsule by mouth Every 7 (Seven) Days.  Dispense: 8 capsule; Refill: 0  -     omeprazole (priLOSEC) 40 MG capsule; Take 1 capsule by mouth Daily for 90 days.  Dispense: 90 capsule; Refill: 0  1. Intermittent claudication (HCC) (Primary)  - Referral for venous doppler to bilateral lower extremities/    2. Dyspnea and respiratory abnormalities  - A chest x-ray will be performed.    3. Other fatigue  - Labs will be performed.    4. Vitamin B12 deficiency  - Labs will be performed.    5. Vitamin D deficiency  - Labs will be performed.  - A refill of her vitamin D has been sent to her pharmacy.\    6. Gastroesophageal reflux disease with esophagitis without hemorrhage  - Referral written for an EGD.  - Omeprazole has been sent to her pharmacy.    7. Migraines  - Will order a CT scan in the future.  - If CT scan is normal, will consider medication for migraines.      Orders Placed Today:     New Medications Ordered This Visit    Medications   • vitamin D (ERGOCALCIFEROL) 1.25 MG (27173 UT) capsule capsule     Sig: Take 1 capsule by mouth Every 7 (Seven) Days.     Dispense:  8 capsule     Refill:  0   • omeprazole (priLOSEC) 40 MG capsule     Sig: Take 1 capsule by mouth Daily for 90 days.     Dispense:  90 capsule     Refill:  0        Management Plan:     An After Visit Summary was printed and given to the patient at discharge.    Follow-up: No follow-ups on file.    SARITA Cabrera 11/3/2022 13:37 EDT  This note was electronically signed.    Transcribed from ambient dictation for SARITA Cabrera by Andres Ruiz.  11/03/22   15:31 EDT    I have personally performed the services described in this document as transcribed by the above individual, and it is both accurate and complete.

## 2022-11-03 NOTE — PROGRESS NOTES
Venipuncture Blood Specimen Collection  Venipuncture performed in R ARM by Mikki Soler MA with good hemostasis. Patient tolerated the procedure well without complications.   11/03/22   Mikki Soler MA     complains of pain/discomfort

## 2022-11-04 ENCOUNTER — TELEPHONE (OUTPATIENT)
Dept: FAMILY MEDICINE CLINIC | Facility: CLINIC | Age: 27
End: 2022-11-04

## 2022-11-04 ENCOUNTER — LAB (OUTPATIENT)
Dept: LAB | Facility: HOSPITAL | Age: 27
End: 2022-11-04

## 2022-11-04 DIAGNOSIS — T14.8XXA BRUISING: ICD-10-CM

## 2022-11-04 DIAGNOSIS — R10.9 FLANK PAIN: ICD-10-CM

## 2022-11-04 DIAGNOSIS — R58 EXCESSIVE BLEEDING: Primary | ICD-10-CM

## 2022-11-04 LAB
25(OH)D3 SERPL-MCNC: 59.3 NG/ML (ref 30–100)
ALBUMIN SERPL-MCNC: 4.7 G/DL (ref 3.5–5.2)
ALBUMIN/GLOB SERPL: 1.5 G/DL
ALP SERPL-CCNC: 86 U/L (ref 39–117)
ALT SERPL W P-5'-P-CCNC: 40 U/L (ref 1–33)
ANION GAP SERPL CALCULATED.3IONS-SCNC: 11.3 MMOL/L (ref 5–15)
AST SERPL-CCNC: 30 U/L (ref 1–32)
BACTERIA UR QL AUTO: ABNORMAL /HPF
BILIRUB SERPL-MCNC: 0.3 MG/DL (ref 0–1.2)
BILIRUB UR QL STRIP: NEGATIVE
BUN SERPL-MCNC: 5 MG/DL (ref 6–20)
BUN/CREAT SERPL: 6.2 (ref 7–25)
CALCIUM SPEC-SCNC: 9.8 MG/DL (ref 8.6–10.5)
CHLORIDE SERPL-SCNC: 103 MMOL/L (ref 98–107)
CLARITY UR: ABNORMAL
CO2 SERPL-SCNC: 25.7 MMOL/L (ref 22–29)
COLOR UR: YELLOW
CREAT SERPL-MCNC: 0.81 MG/DL (ref 0.57–1)
EGFRCR SERPLBLD CKD-EPI 2021: 102.2 ML/MIN/1.73
GLOBULIN UR ELPH-MCNC: 3.2 GM/DL
GLUCOSE SERPL-MCNC: 77 MG/DL (ref 65–99)
GLUCOSE UR STRIP-MCNC: NEGATIVE MG/DL
HGB UR QL STRIP.AUTO: ABNORMAL
HYALINE CASTS UR QL AUTO: ABNORMAL /LPF
INR PPP: 0.95 (ref 0.93–1.1)
KETONES UR QL STRIP: NEGATIVE
LEUKOCYTE ESTERASE UR QL STRIP.AUTO: ABNORMAL
NITRITE UR QL STRIP: NEGATIVE
PH UR STRIP.AUTO: 7 [PH] (ref 5–8)
POTASSIUM SERPL-SCNC: 3.9 MMOL/L (ref 3.5–5.2)
PROT SERPL-MCNC: 7.9 G/DL (ref 6–8.5)
PROT UR QL STRIP: ABNORMAL
PROTHROMBIN TIME: 9.8 SECONDS (ref 9.6–11.7)
RBC # UR STRIP: ABNORMAL /HPF
REF LAB TEST METHOD: ABNORMAL
SODIUM SERPL-SCNC: 140 MMOL/L (ref 136–145)
SP GR UR STRIP: 1.01 (ref 1–1.03)
SQUAMOUS #/AREA URNS HPF: ABNORMAL /HPF
T4 FREE SERPL-MCNC: 1.19 NG/DL (ref 0.93–1.7)
TSH SERPL DL<=0.05 MIU/L-ACNC: 3.65 UIU/ML (ref 0.27–4.2)
UROBILINOGEN UR QL STRIP: ABNORMAL
VIT B12 BLD-MCNC: 717 PG/ML (ref 211–946)
WBC # UR STRIP: ABNORMAL /HPF

## 2022-11-04 PROCEDURE — 81001 URINALYSIS AUTO W/SCOPE: CPT

## 2022-11-04 PROCEDURE — 87077 CULTURE AEROBIC IDENTIFY: CPT

## 2022-11-04 PROCEDURE — 87086 URINE CULTURE/COLONY COUNT: CPT

## 2022-11-04 PROCEDURE — 87186 SC STD MICRODIL/AGAR DIL: CPT

## 2022-11-04 PROCEDURE — 85610 PROTHROMBIN TIME: CPT

## 2022-11-04 NOTE — TELEPHONE ENCOUNTER
Caller: Eugenia Le    Relationship: Self    Best call back number: [phone]    What was the call regarding: PATIENT JUST WANTED TO LET PRANAY KNOW THAT SHE HAD HER BLOOD TEST AND URINALYSIS DONE.    SHE WOULD ALSO LIKE TO KNOW WHEN THESE RESULTS WILL BE BACK.    PLEASE CALL.    Do you require a callback: YES

## 2022-11-06 LAB — BACTERIA SPEC AEROBE CULT: ABNORMAL

## 2022-11-09 RX ORDER — SULFAMETHOXAZOLE AND TRIMETHOPRIM 800; 160 MG/1; MG/1
1 TABLET ORAL 2 TIMES DAILY
Qty: 20 TABLET | Refills: 0 | Status: SHIPPED | OUTPATIENT
Start: 2022-11-09 | End: 2022-11-19

## 2022-11-11 ENCOUNTER — TRANSCRIBE ORDERS (OUTPATIENT)
Dept: ADMINISTRATIVE | Facility: HOSPITAL | Age: 27
End: 2022-11-11

## 2022-11-11 DIAGNOSIS — I73.9 INTERMITTENT CLAUDICATION: Primary | ICD-10-CM

## 2022-11-25 ENCOUNTER — APPOINTMENT (OUTPATIENT)
Dept: ULTRASOUND IMAGING | Facility: HOSPITAL | Age: 27
End: 2022-11-25

## 2022-11-25 ENCOUNTER — HOSPITAL ENCOUNTER (OUTPATIENT)
Dept: ULTRASOUND IMAGING | Facility: HOSPITAL | Age: 27
Discharge: HOME | End: 2022-11-25

## 2022-11-25 ENCOUNTER — HOSPITAL ENCOUNTER (OUTPATIENT)
Dept: CARDIOLOGY | Facility: HOSPITAL | Age: 27
Discharge: HOME | End: 2022-11-25

## 2022-11-25 ENCOUNTER — HOSPITAL ENCOUNTER (OUTPATIENT)
Dept: CT IMAGING | Facility: HOSPITAL | Age: 27
Discharge: HOME | End: 2022-11-25

## 2022-11-25 DIAGNOSIS — K62.5 RECTAL BLEEDING: ICD-10-CM

## 2022-11-25 DIAGNOSIS — K59.00 CONSTIPATION, UNSPECIFIED CONSTIPATION TYPE: ICD-10-CM

## 2022-11-25 DIAGNOSIS — I73.9 INTERMITTENT CLAUDICATION: ICD-10-CM

## 2022-11-25 DIAGNOSIS — G43.001 MIGRAINE WITHOUT AURA AND WITH STATUS MIGRAINOSUS, NOT INTRACTABLE: ICD-10-CM

## 2022-11-25 DIAGNOSIS — R10.30 DISCOMFORT OF GROIN, UNSPECIFIED LATERALITY: ICD-10-CM

## 2022-11-25 LAB
BH CV LOWER VASCULAR LEFT COMMON FEMORAL AUGMENT: NORMAL
BH CV LOWER VASCULAR LEFT COMMON FEMORAL COMPETENT: NORMAL
BH CV LOWER VASCULAR LEFT COMMON FEMORAL COMPRESS: NORMAL
BH CV LOWER VASCULAR LEFT COMMON FEMORAL PHASIC: NORMAL
BH CV LOWER VASCULAR LEFT COMMON FEMORAL SPONT: NORMAL
BH CV LOWER VASCULAR RIGHT COMMON FEMORAL AUGMENT: NORMAL
BH CV LOWER VASCULAR RIGHT COMMON FEMORAL COMPETENT: NORMAL
BH CV LOWER VASCULAR RIGHT COMMON FEMORAL COMPRESS: NORMAL
BH CV LOWER VASCULAR RIGHT COMMON FEMORAL PHASIC: NORMAL
BH CV LOWER VASCULAR RIGHT COMMON FEMORAL SPONT: NORMAL
BH CV LOWER VASCULAR RIGHT DISTAL FEMORAL COMPRESS: NORMAL
BH CV LOWER VASCULAR RIGHT GASTRONEMIUS COMPRESS: NORMAL
BH CV LOWER VASCULAR RIGHT GREATER SAPH AK COMPRESS: NORMAL
BH CV LOWER VASCULAR RIGHT GREATER SAPH BK COMPRESS: NORMAL
BH CV LOWER VASCULAR RIGHT LESSER SAPH COMPRESS: NORMAL
BH CV LOWER VASCULAR RIGHT MID FEMORAL AUGMENT: NORMAL
BH CV LOWER VASCULAR RIGHT MID FEMORAL COMPETENT: NORMAL
BH CV LOWER VASCULAR RIGHT MID FEMORAL COMPRESS: NORMAL
BH CV LOWER VASCULAR RIGHT MID FEMORAL PHASIC: NORMAL
BH CV LOWER VASCULAR RIGHT MID FEMORAL SPONT: NORMAL
BH CV LOWER VASCULAR RIGHT PERONEAL COMPRESS: NORMAL
BH CV LOWER VASCULAR RIGHT POPLITEAL AUGMENT: NORMAL
BH CV LOWER VASCULAR RIGHT POPLITEAL COMPETENT: NORMAL
BH CV LOWER VASCULAR RIGHT POPLITEAL COMPRESS: NORMAL
BH CV LOWER VASCULAR RIGHT POPLITEAL PHASIC: NORMAL
BH CV LOWER VASCULAR RIGHT POPLITEAL SPONT: NORMAL
BH CV LOWER VASCULAR RIGHT POSTERIOR TIBIAL COMPRESS: NORMAL
BH CV LOWER VASCULAR RIGHT PROXIMAL FEMORAL COMPRESS: NORMAL
BH CV LOWER VASCULAR RIGHT SAPHENOFEMORAL JUNCTION COMPRESS: NORMAL
MAXIMAL PREDICTED HEART RATE: 193 BPM
STRESS TARGET HR: 164 BPM

## 2022-11-25 PROCEDURE — 74176 CT ABD & PELVIS W/O CONTRAST: CPT

## 2022-11-25 PROCEDURE — 93971 EXTREMITY STUDY: CPT

## 2022-11-25 PROCEDURE — 70450 CT HEAD/BRAIN W/O DYE: CPT

## 2022-11-25 PROCEDURE — 76881 US COMPL JOINT R-T W/IMG: CPT

## 2022-11-30 ENCOUNTER — TELEPHONE (OUTPATIENT)
Dept: FAMILY MEDICINE CLINIC | Facility: CLINIC | Age: 27
End: 2022-11-30

## 2022-12-01 ENCOUNTER — TELEPHONE (OUTPATIENT)
Dept: FAMILY MEDICINE CLINIC | Facility: CLINIC | Age: 27
End: 2022-12-01

## 2022-12-01 NOTE — TELEPHONE ENCOUNTER
Hub staff attempted to follow warm transfer process and was unsuccessful     Caller: Eugenia Le    Relationship to patient: Self    Best call back number: [phone]    Patient is needing: CALL BACK CONCERNING LAB RESULTS

## 2022-12-20 ENCOUNTER — OFFICE VISIT (OUTPATIENT)
Dept: FAMILY MEDICINE CLINIC | Facility: CLINIC | Age: 27
End: 2022-12-20

## 2022-12-20 VITALS
OXYGEN SATURATION: 97 % | SYSTOLIC BLOOD PRESSURE: 136 MMHG | BODY MASS INDEX: 31.93 KG/M2 | TEMPERATURE: 98.2 F | WEIGHT: 148 LBS | RESPIRATION RATE: 20 BRPM | DIASTOLIC BLOOD PRESSURE: 89 MMHG | HEIGHT: 57 IN | HEART RATE: 88 BPM

## 2022-12-20 DIAGNOSIS — F41.9 ANXIETY: ICD-10-CM

## 2022-12-20 DIAGNOSIS — G43.111 INTRACTABLE MIGRAINE WITH AURA WITH STATUS MIGRAINOSUS: Primary | ICD-10-CM

## 2022-12-20 DIAGNOSIS — R06.83 SNORING: ICD-10-CM

## 2022-12-20 PROBLEM — G43.909 MIGRAINES: Status: ACTIVE | Noted: 2022-12-20

## 2022-12-20 PROCEDURE — T1015 CLINIC SERVICE: HCPCS | Performed by: REGISTERED NURSE

## 2022-12-20 PROCEDURE — 99214 OFFICE O/P EST MOD 30 MIN: CPT | Performed by: REGISTERED NURSE

## 2022-12-20 RX ORDER — SUMATRIPTAN 50 MG/1
TABLET, FILM COATED ORAL
Qty: 9 TABLET | Refills: 3 | Status: SHIPPED | OUTPATIENT
Start: 2022-12-20 | End: 2023-02-14 | Stop reason: SDUPTHER

## 2022-12-20 RX ORDER — BUSPIRONE HYDROCHLORIDE 5 MG/1
TABLET ORAL
Qty: 90 TABLET | Refills: 1 | Status: SHIPPED | OUTPATIENT
Start: 2022-12-20

## 2022-12-20 NOTE — PROGRESS NOTES
Chief Complaint  Anxiety (2 week f/u) and Migraine (Wants to discuss medication)    Subjective    History of Present Illness {CC  Problem List  Visit  Diagnosis   Encounters  Notes  Medications  Labs  Result Review Imaging  Media :23}     Eugenia Le presents to [facility] for Anxiety (2 week f/u) and Migraine (Wants to discuss medication).    History of Present Illness  Patient is a 27-year-old female presents to the clinic today for 2-week follow-up of anxiety and migraines.  Patient denies any chest pain, shortness of breath, or any fevers.  Patient denies any known exposure to COVID, flu, or any other contagious illnesses.    In regards to anxiety, patient shares that her BuSpar is helping.  Patient would like to be able to take this up to 3 times per day as needed.  We discussed benefits versus risks and I have increased that dosing to match that accordingly.    In regards to migraines, in the past patient shares that she had taken Imitrex to help with migraines.  Patient shares that this is previously been very helpful for her patient is requesting a prescription for this to be sent over today to help with the migraines.       Review of Systems   Constitutional: Negative.  Negative for activity change, chills, fatigue and fever.   HENT: Negative for congestion, dental problem, ear pain, hearing loss, rhinorrhea, sinus pain, sore throat, tinnitus and trouble swallowing.    Eyes: Negative.  Negative for pain and visual disturbance.   Respiratory: Negative.  Negative for cough, chest tightness, shortness of breath and wheezing.    Cardiovascular: Negative.  Negative for chest pain, palpitations and leg swelling.   Gastrointestinal: Negative.  Negative for abdominal pain, diarrhea, nausea and vomiting.   Endocrine: Negative.  Negative for polydipsia, polyphagia and polyuria.   Genitourinary: Negative.  Negative for difficulty urinating, dysuria, frequency and urgency.  "  Musculoskeletal: Negative.  Negative for arthralgias, back pain and myalgias.   Skin: Negative.  Negative for color change, pallor, rash and wound.   Allergic/Immunologic: Negative.  Negative for environmental allergies.   Neurological: Positive for headaches. Negative for dizziness, speech difficulty, weakness, light-headedness and numbness.   Hematological: Negative.    Psychiatric/Behavioral: Negative for confusion, decreased concentration, self-injury and suicidal ideas. The patient is nervous/anxious.    All other systems reviewed and are negative.       Objective     Vital Signs:   /89 (BP Location: Right arm, Patient Position: Sitting, Cuff Size: Adult)   Pulse 88   Temp 98.2 °F (36.8 °C) (Temporal)   Resp 20   Ht 144.8 cm (57\")   Wt 67.1 kg (148 lb)   SpO2 97%   BMI 32.03 kg/m²   Current Outpatient Medications on File Prior to Visit   Medication Sig Dispense Refill   • ferrous gluconate 324 (37.5 Fe) MG tablet tablet Take 1 tablet by mouth Daily With Breakfast. 30 tablet 5   • omeprazole (priLOSEC) 40 MG capsule Take 1 capsule by mouth Daily for 90 days. 90 capsule 0   • sertraline (Zoloft) 100 MG tablet Take 1 tablet by mouth Daily. 90 tablet 0   • vitamin D (ERGOCALCIFEROL) 1.25 MG (78755 UT) capsule capsule Take 1 capsule by mouth Every 7 (Seven) Days. 8 capsule 0   • [DISCONTINUED] busPIRone (BUSPAR) 5 MG tablet Take 1 tablet by mouth Every Night. 30 tablet 0     No current facility-administered medications on file prior to visit.        Past Medical History:   Diagnosis Date   • Anxiety    • Migraines       Past Surgical History:   Procedure Laterality Date   •  SECTION  2018   • TEETH EXTRACTION  2019    All top teeth removed- wears dentures   • TUBAL ABDOMINAL LIGATION  2018      Family History   Problem Relation Age of Onset   • Anxiety disorder Mother    • Heart disease Father    • Hypertension Father    • Diverticulitis Father    • Autism Sister    • No Known Problems " Daughter    • No Known Problems Son       Social History     Socioeconomic History   • Marital status:    Tobacco Use   • Smoking status: Former     Packs/day: 0.25     Years: 1.00     Pack years: 0.25     Types: Cigarettes     Start date: 2014     Quit date: 2014     Years since quittin.0   • Smokeless tobacco: Never   Vaping Use   • Vaping Use: Never used   Substance and Sexual Activity   • Alcohol use: Not Currently   • Drug use: Never   • Sexual activity: Yes     Birth control/protection: Tubal ligation         Hospital Outpatient Visit on 2022   Component Date Value Ref Range Status   • Target HR (85%) 2022 164  bpm Final   • Max. Pred. HR (100%) 2022 193  bpm Final   • Right Common Femoral Spont 2022 Y   Final   • Right Common Femoral Competent 2022 Y   Final   • Right Common Femoral Phasic 2022 Y   Final   • Right Common Femoral Compress 2022 C   Final   • Right Common Femoral Augment 2022 Y   Final   • Right Saphenofemoral Junction Comp* 2022 C   Final   • Right Proximal Femoral Compress 2022 C   Final   • Right Mid Femoral Spont 2022 Y   Final   • Right Mid Femoral Competent 2022 Y   Final   • Right Mid Femoral Phasic 2022 Y   Final   • Right Mid Femoral Compress 2022 C   Final   • Right Mid Femoral Augment 2022 Y   Final   • Right Distal Femoral Compress 2022 C   Final   • Right Popliteal Spont 2022 Y   Final   • Right Popliteal Competent 2022 Y   Final   • Right Popliteal Phasic 2022 Y   Final   • Right Popliteal Compress 2022 C   Final   • Right Popliteal Augment 2022 Y   Final   • Right Posterior Tibial Compress 2022 C   Final   • Right Peroneal Compress 2022 C   Final   • Right Gastronemius Compress 2022 C   Final   • Right Greater Saph AK Compress 2022 C   Final   • Right Greater Saph BK Compress 2022 C   Final   • Right Lesser  Saph Compress 11/25/2022 C   Final   • Left Common Femoral Spont 11/25/2022 Y   Final   • Left Common Femoral Competent 11/25/2022 Y   Final   • Left Common Femoral Phasic 11/25/2022 Y   Final   • Left Common Femoral Compress 11/25/2022 C   Final   • Left Common Femoral Augment 11/25/2022 Y   Final   Lab on 11/04/2022   Component Date Value Ref Range Status   • Protime 11/04/2022 9.8  9.6 - 11.7 Seconds Final   • INR 11/04/2022 0.95  0.93 - 1.10 Final   • Color, UA 11/04/2022 Yellow  Yellow, Straw Final   • Appearance, UA 11/04/2022 Cloudy (A)  Clear Final   • pH, UA 11/04/2022 7.0  5.0 - 8.0 Final   • Specific Gravity, UA 11/04/2022 1.014  1.005 - 1.030 Final   • Glucose, UA 11/04/2022 Negative  Negative Final   • Ketones, UA 11/04/2022 Negative  Negative Final   • Bilirubin, UA 11/04/2022 Negative  Negative Final   • Blood, UA 11/04/2022 Moderate (2+) (A)  Negative Final   • Protein, UA 11/04/2022 30 mg/dL (1+) (A)  Negative Final   • Leuk Esterase, UA 11/04/2022 Large (3+) (A)  Negative Final   • Nitrite, UA 11/04/2022 Negative  Negative Final   • Urobilinogen, UA 11/04/2022 0.2 E.U./dL  0.2 - 1.0 E.U./dL Final   • Urine Culture 11/04/2022 >100,000 CFU/mL Escherichia coli (A)   Final   • RBC, UA 11/04/2022 6-12 (A)  None Seen, 0-2 /HPF Final   • WBC, UA 11/04/2022 Too Numerous to Count (A)  None Seen, 0-2 /HPF Final   • Bacteria, UA 11/04/2022 4+ (A)  None Seen /HPF Final   • Squamous Epithelial Cells, UA 11/04/2022 13-20 (A)  None Seen, 0-2 /HPF Final   • Hyaline Casts, UA 11/04/2022 7-12  None Seen /LPF Final   • Methodology 11/04/2022 Automated Microscopy   Final   Office Visit on 11/03/2022   Component Date Value Ref Range Status   • TSH 11/03/2022 3.650  0.270 - 4.200 uIU/mL Final   • Free T4 11/03/2022 1.19  0.93 - 1.70 ng/dL Final    T4 results may be falsely increased if patient taking Biotin.   • Ferritin 11/03/2022 35.05  13.00 - 150.00 ng/mL Final   • Iron 11/03/2022 74  37 - 145 mcg/dL Final   • Iron  Saturation 11/03/2022 18 (L)  20 - 50 % Final   • Transferrin 11/03/2022 280  200 - 360 mg/dL Final   • TIBC 11/03/2022 417  298 - 536 mcg/dL Final   • Protime 11/03/2022 42.4 (H)  9.6 - 11.7 Seconds Final   • INR 11/03/2022 4.48 (C)  0.93 - 1.10 Final   • 25 Hydroxy, Vitamin D 11/03/2022 59.3  30.0 - 100.0 ng/ml Final   • Glucose 11/03/2022 77  65 - 99 mg/dL Final   • BUN 11/03/2022 5 (L)  6 - 20 mg/dL Final   • Creatinine 11/03/2022 0.81  0.57 - 1.00 mg/dL Final   • Sodium 11/03/2022 140  136 - 145 mmol/L Final   • Potassium 11/03/2022 3.9  3.5 - 5.2 mmol/L Final   • Chloride 11/03/2022 103  98 - 107 mmol/L Final   • CO2 11/03/2022 25.7  22.0 - 29.0 mmol/L Final   • Calcium 11/03/2022 9.8  8.6 - 10.5 mg/dL Final   • Total Protein 11/03/2022 7.9  6.0 - 8.5 g/dL Final   • Albumin 11/03/2022 4.70  3.50 - 5.20 g/dL Final   • ALT (SGPT) 11/03/2022 40 (H)  1 - 33 U/L Final   • AST (SGOT) 11/03/2022 30  1 - 32 U/L Final   • Alkaline Phosphatase 11/03/2022 86  39 - 117 U/L Final   • Total Bilirubin 11/03/2022 0.3  0.0 - 1.2 mg/dL Final   • Globulin 11/03/2022 3.2  gm/dL Final   • A/G Ratio 11/03/2022 1.5  g/dL Final   • BUN/Creatinine Ratio 11/03/2022 6.2 (L)  7.0 - 25.0 Final   • Anion Gap 11/03/2022 11.3  5.0 - 15.0 mmol/L Final   • eGFR 11/03/2022 102.2  >60.0 mL/min/1.73 Final    National Kidney Foundation and American Society of Nephrology (ASN) Task Force recommended calculation based on the Chronic Kidney Disease Epidemiology Collaboration (CKD-EPI) equation refit without adjustment for race.   • Vitamin B-12 11/03/2022 717  211 - 946 pg/mL Final   • WBC 11/03/2022 6.60  3.40 - 10.80 10*3/mm3 Final   • RBC 11/03/2022 4.46  3.77 - 5.28 10*6/mm3 Final   • Hemoglobin 11/03/2022 13.1  12.0 - 15.9 g/dL Final   • Hematocrit 11/03/2022 40.6  34.0 - 46.6 % Final   • MCV 11/03/2022 90.8  79.0 - 97.0 fL Final   • MCH 11/03/2022 29.2  26.6 - 33.0 pg Final   • MCHC 11/03/2022 32.2  31.5 - 35.7 g/dL Final   • RDW 11/03/2022  14.7  12.3 - 15.4 % Final   • RDW-SD 11/03/2022 45.9  37.0 - 54.0 fl Final   • MPV 11/03/2022 8.3  6.0 - 12.0 fL Final   • Platelets 11/03/2022 325  140 - 450 10*3/mm3 Final   • Neutrophil % 11/03/2022 62.4  42.7 - 76.0 % Final   • Lymphocyte % 11/03/2022 27.3  19.6 - 45.3 % Final   • Monocyte % 11/03/2022 7.5  5.0 - 12.0 % Final   • Eosinophil % 11/03/2022 2.3  0.3 - 6.2 % Final   • Basophil % 11/03/2022 0.5  0.0 - 1.5 % Final   • Neutrophils, Absolute 11/03/2022 4.10  1.70 - 7.00 10*3/mm3 Final   • Lymphocytes, Absolute 11/03/2022 1.80  0.70 - 3.10 10*3/mm3 Final   • Monocytes, Absolute 11/03/2022 0.50  0.10 - 0.90 10*3/mm3 Final   • Eosinophils, Absolute 11/03/2022 0.10  0.00 - 0.40 10*3/mm3 Final   • Basophils, Absolute 11/03/2022 0.00  0.00 - 0.20 10*3/mm3 Final   • nRBC 11/03/2022 0.1  0.0 - 0.2 /100 WBC Final   Office Visit on 09/22/2022   Component Date Value Ref Range Status   • Glucose 09/22/2022 87  65 - 99 mg/dL Final   • BUN 09/22/2022 6  6 - 20 mg/dL Final   • Creatinine 09/22/2022 0.79  0.57 - 1.00 mg/dL Final   • Sodium 09/22/2022 136  136 - 145 mmol/L Final   • Potassium 09/22/2022 3.6  3.5 - 5.2 mmol/L Final   • Chloride 09/22/2022 101  98 - 107 mmol/L Final   • CO2 09/22/2022 22.5  22.0 - 29.0 mmol/L Final   • Calcium 09/22/2022 9.8  8.6 - 10.5 mg/dL Final   • Total Protein 09/22/2022 8.0  6.0 - 8.5 g/dL Final   • Albumin 09/22/2022 4.60  3.50 - 5.20 g/dL Final   • ALT (SGPT) 09/22/2022 14  1 - 33 U/L Final   • AST (SGOT) 09/22/2022 19  1 - 32 U/L Final   • Alkaline Phosphatase 09/22/2022 83  39 - 117 U/L Final   • Total Bilirubin 09/22/2022 0.2  0.0 - 1.2 mg/dL Final   • Globulin 09/22/2022 3.4  gm/dL Final   • A/G Ratio 09/22/2022 1.4  g/dL Final   • BUN/Creatinine Ratio 09/22/2022 7.6  7.0 - 25.0 Final   • Anion Gap 09/22/2022 12.5  5.0 - 15.0 mmol/L Final   • eGFR 09/22/2022 105.3  >60.0 mL/min/1.73 Final    National Kidney Foundation and American Society of Nephrology (ASN) Task Force  recommended calculation based on the Chronic Kidney Disease Epidemiology Collaboration (CKD-EPI) equation refit without adjustment for race.   • Total Cholesterol 09/22/2022 170  0 - 200 mg/dL Final   • Triglycerides 09/22/2022 43  0 - 150 mg/dL Final   • HDL Cholesterol 09/22/2022 56  40 - 60 mg/dL Final   • LDL Cholesterol  09/22/2022 105 (H)  0 - 100 mg/dL Final   • VLDL Cholesterol 09/22/2022 9  5 - 40 mg/dL Final   • LDL/HDL Ratio 09/22/2022 1.88   Final   • TSH 09/22/2022 1.580  0.270 - 4.200 uIU/mL Final   • Free T4 09/22/2022 1.10  0.93 - 1.70 ng/dL Final   • Vitamin B-12 09/22/2022 622  211 - 946 pg/mL Final   • 25 Hydroxy, Vitamin D 09/22/2022 28.3 (L)  30.0 - 100.0 ng/ml Final   • Hemoglobin A1C 09/22/2022 5.0  3.5 - 5.6 % Final   • Ferritin 09/22/2022 26.00  13.00 - 150.00 ng/mL Final   • Iron 09/22/2022 44  37 - 145 mcg/dL Final   • Iron Saturation 09/22/2022 10 (L)  20 - 50 % Final   • Transferrin 09/22/2022 285  200 - 360 mg/dL Final   • TIBC 09/22/2022 425  298 - 536 mcg/dL Final   • WBC 09/22/2022 9.65  3.40 - 10.80 10*3/mm3 Final   • RBC 09/22/2022 4.55  3.77 - 5.28 10*6/mm3 Final   • Hemoglobin 09/22/2022 12.7  12.0 - 15.9 g/dL Final   • Hematocrit 09/22/2022 39.1  34.0 - 46.6 % Final   • MCV 09/22/2022 85.9  79.0 - 97.0 fL Final   • MCH 09/22/2022 27.9  26.6 - 33.0 pg Final   • MCHC 09/22/2022 32.5  31.5 - 35.7 g/dL Final   • RDW 09/22/2022 12.4  12.3 - 15.4 % Final   • RDW-SD 09/22/2022 39.1  37.0 - 54.0 fl Final   • MPV 09/22/2022 10.7  6.0 - 12.0 fL Final   • Platelets 09/22/2022 302  140 - 450 10*3/mm3 Final   • Neutrophil % 09/22/2022 74.1  42.7 - 76.0 % Final   • Lymphocyte % 09/22/2022 19.1 (L)  19.6 - 45.3 % Final   • Monocyte % 09/22/2022 5.5  5.0 - 12.0 % Final   • Eosinophil % 09/22/2022 0.6  0.3 - 6.2 % Final   • Basophil % 09/22/2022 0.4  0.0 - 1.5 % Final   • Immature Grans % 09/22/2022 0.3  0.0 - 0.5 % Final   • Neutrophils, Absolute 09/22/2022 7.15 (H)  1.70 - 7.00 10*3/mm3 Final    • Lymphocytes, Absolute 09/22/2022 1.84  0.70 - 3.10 10*3/mm3 Final   • Monocytes, Absolute 09/22/2022 0.53  0.10 - 0.90 10*3/mm3 Final   • Eosinophils, Absolute 09/22/2022 0.06  0.00 - 0.40 10*3/mm3 Final   • Basophils, Absolute 09/22/2022 0.04  0.00 - 0.20 10*3/mm3 Final   • Immature Grans, Absolute 09/22/2022 0.03  0.00 - 0.05 10*3/mm3 Final   • nRBC 09/22/2022 0.0  0.0 - 0.2 /100 WBC Final         Physical Exam  Vitals and nursing note reviewed.   Constitutional:       Appearance: Normal appearance. She is normal weight.   HENT:      Head: Normocephalic and atraumatic.   Cardiovascular:      Rate and Rhythm: Normal rate and regular rhythm.      Pulses: Normal pulses.      Heart sounds: Normal heart sounds. No murmur heard.    No friction rub. No gallop.   Pulmonary:      Effort: Pulmonary effort is normal. No respiratory distress.      Breath sounds: Normal breath sounds. No stridor. No wheezing, rhonchi or rales.   Chest:      Chest wall: No tenderness.   Abdominal:      General: Abdomen is flat. Bowel sounds are normal. There is no distension.      Palpations: Abdomen is soft. There is no mass.      Tenderness: There is no abdominal tenderness. There is no right CVA tenderness, left CVA tenderness, guarding or rebound.      Hernia: No hernia is present.   Skin:     General: Skin is warm and dry.      Capillary Refill: Capillary refill takes less than 2 seconds.      Coloration: Skin is not jaundiced or pale.   Neurological:      General: No focal deficit present.      Mental Status: She is alert and oriented to person, place, and time. Mental status is at baseline.      Motor: No weakness.      Coordination: Coordination normal.      Gait: Gait normal.   Psychiatric:         Mood and Affect: Mood normal.         Behavior: Behavior normal.         Thought Content: Thought content normal.         Judgment: Judgment normal.          Result Review  Data Reviewed:{ Labs  Result Review  Imaging  Med Tab  Media  :23}   I have reviewed this patient's chart.  I have reviewed previous labs, previous imaging, previous medications, and previous encounters with notes that were available in this patient's chart.           Assessment and Plan {CC Problem List  Visit Diagnosis  ROS  Review (Popup)  [facility]  [facility]  Medications  [facility]  SnapShot Encounters  Media :23}   Diagnoses and all orders for this visit:    1. Intractable migraine with aura with status migrainosus (Primary)  -     SUMAtriptan (Imitrex) 50 MG tablet; Take one tablet at onset of headache. May repeat dose one time in 2 hours if headache not relieved.  Dispense: 9 tablet; Refill: 3    2. Anxiety  -     busPIRone (BUSPAR) 5 MG tablet; Take 1 tablet by mouth Every Night. May also take 1 tablet 2 (Two) Times a Day As Needed (anxiety).  Dispense: 90 tablet; Refill: 1    3. Snoring  -     Ambulatory Referral to Sleep Medicine      -Reordering Imitrex to help control patient's migraines.  -Increase BuSpar to up to 3 times daily as needed  -Referral to sleep medicine due to patient's snoring and parents having sleep apnea.      I spent 30 minutes caring for Eugenia on this date of service. This time includes time spent by me in the following activities:preparing for the visit, reviewing tests, obtaining and/or reviewing a separately obtained history, performing a medically appropriate examination and/or evaluation , counseling and educating the patient/family/caregiver, ordering medications, tests, or procedures, referring and communicating with other health care professionals , documenting information in the medical record, independently interpreting results and communicating that information with the patient/family/caregiver and care coordination.    Follow Up {Instructions Charge Capture  Follow-up Communications :23}     Patient was given instructions and counseling regarding her condition or for health maintenance advice.  Please see specific information pulled into the AVS (placed there by myself) if appropriate.    Return in about 4 weeks (around 1/17/2023) for Recheck migraine medication and anxiety.    MDM  Number of Diagnoses or Management Options  Anxiety: established, improving  Intractable migraine with aura with status migrainosus: established, worsening  Snoring: established, worsening     Amount and/or Complexity of Data Reviewed  Clinical lab tests: reviewed  Tests in the radiology section of CPT®: reviewed  Tests in the medicine section of CPT®: reviewed  Discussion of test results with the performing providers: no  Decide to obtain previous medical records or to obtain history from someone other than the patient: no  Obtain history from someone other than the patient: no  Review and summarize past medical records: yes  Discuss the patient with other providers: no  Independent visualization of images, tracings, or specimens: no    Risk of Complications, Morbidity, and/or Mortality  Presenting problems: high  Diagnostic procedures: minimal  Management options: moderate    Critical Care  Total time providing critical care: 30-74 minutes    Patient Progress  Patient progress: stable       BMI is >= 30 and <35. (Class 1 Obesity). The following options were offered after discussion;: exercise counseling/recommendations and nutrition counseling/recommendations       SARITA Alex, FNP-BC

## 2022-12-25 ENCOUNTER — HOSPITAL ENCOUNTER (EMERGENCY)
Facility: HOSPITAL | Age: 27
Discharge: HOME | End: 2022-12-25
Attending: EMERGENCY MEDICINE | Admitting: EMERGENCY MEDICINE
Payer: MEDICAID

## 2022-12-25 VITALS
HEIGHT: 57 IN | TEMPERATURE: 99 F | BODY MASS INDEX: 32.12 KG/M2 | SYSTOLIC BLOOD PRESSURE: 109 MMHG | HEART RATE: 96 BPM | WEIGHT: 148.9 LBS | DIASTOLIC BLOOD PRESSURE: 72 MMHG | OXYGEN SATURATION: 97 % | RESPIRATION RATE: 16 BRPM

## 2022-12-25 DIAGNOSIS — Z86.69 HISTORY OF MIGRAINE: ICD-10-CM

## 2022-12-25 DIAGNOSIS — E86.0 DEHYDRATION: ICD-10-CM

## 2022-12-25 DIAGNOSIS — J10.1 INFLUENZA A: Primary | ICD-10-CM

## 2022-12-25 LAB
B-HCG UR QL: NEGATIVE
BACTERIA UR QL AUTO: ABNORMAL /HPF
BILIRUB UR QL STRIP: ABNORMAL
CLARITY UR: ABNORMAL
COLOR UR: ABNORMAL
FLUAV RNA RESP QL NAA+PROBE: DETECTED
FLUBV RNA RESP QL NAA+PROBE: NOT DETECTED
GLUCOSE UR STRIP-MCNC: NEGATIVE MG/DL
HGB UR QL STRIP.AUTO: NEGATIVE
HYALINE CASTS UR QL AUTO: ABNORMAL /LPF
KETONES UR QL STRIP: ABNORMAL
LEUKOCYTE ESTERASE UR QL STRIP.AUTO: NEGATIVE
NITRITE UR QL STRIP: NEGATIVE
PH UR STRIP.AUTO: 6 [PH] (ref 4.5–8)
PROT UR QL STRIP: ABNORMAL
RBC # UR STRIP: ABNORMAL /HPF
REF LAB TEST METHOD: ABNORMAL
SARS-COV-2 RNA RESP QL NAA+PROBE: NOT DETECTED
SP GR UR STRIP: 1.03 (ref 1–1.03)
SQUAMOUS #/AREA URNS HPF: ABNORMAL /HPF
UROBILINOGEN UR QL STRIP: ABNORMAL
WBC # UR STRIP: ABNORMAL /HPF

## 2022-12-25 PROCEDURE — 87636 SARSCOV2 & INF A&B AMP PRB: CPT | Performed by: EMERGENCY MEDICINE

## 2022-12-25 PROCEDURE — 81001 URINALYSIS AUTO W/SCOPE: CPT | Performed by: EMERGENCY MEDICINE

## 2022-12-25 PROCEDURE — 96374 THER/PROPH/DIAG INJ IV PUSH: CPT

## 2022-12-25 PROCEDURE — 25010000002 KETOROLAC TROMETHAMINE PER 15 MG: Performed by: EMERGENCY MEDICINE

## 2022-12-25 PROCEDURE — 81025 URINE PREGNANCY TEST: CPT | Performed by: EMERGENCY MEDICINE

## 2022-12-25 PROCEDURE — 25010000002 ONDANSETRON PER 1 MG: Performed by: EMERGENCY MEDICINE

## 2022-12-25 PROCEDURE — 99283 EMERGENCY DEPT VISIT LOW MDM: CPT

## 2022-12-25 PROCEDURE — 96375 TX/PRO/DX INJ NEW DRUG ADDON: CPT

## 2022-12-25 RX ORDER — KETOROLAC TROMETHAMINE 30 MG/ML
30 INJECTION, SOLUTION INTRAMUSCULAR; INTRAVENOUS ONCE
Status: COMPLETED | OUTPATIENT
Start: 2022-12-25 | End: 2022-12-25

## 2022-12-25 RX ORDER — ONDANSETRON 2 MG/ML
4 INJECTION INTRAMUSCULAR; INTRAVENOUS ONCE
Status: COMPLETED | OUTPATIENT
Start: 2022-12-25 | End: 2022-12-25

## 2022-12-25 RX ORDER — ONDANSETRON 4 MG/1
4 TABLET, ORALLY DISINTEGRATING ORAL EVERY 8 HOURS PRN
Qty: 12 TABLET | Refills: 0 | Status: SHIPPED | OUTPATIENT
Start: 2022-12-25 | End: 2023-01-27 | Stop reason: SDUPTHER

## 2022-12-25 RX ADMIN — SODIUM CHLORIDE 1000 ML: 9 INJECTION, SOLUTION INTRAVENOUS at 08:11

## 2022-12-25 RX ADMIN — KETOROLAC TROMETHAMINE 30 MG: 30 INJECTION, SOLUTION INTRAMUSCULAR; INTRAVENOUS at 08:15

## 2022-12-25 RX ADMIN — ONDANSETRON 4 MG: 2 INJECTION INTRAMUSCULAR; INTRAVENOUS at 08:14

## 2022-12-25 NOTE — ED PROVIDER NOTES
Subjective   History of Present Illness  Patient reports her children and  have been ill this week but were never tested for infectious disease.  Patient has longstanding history of migraine.  The last 48 hours has had subjective fever chills nonproductive cough myalgias diarrhea.  Headaches are refractory to her home sumatriptan.  Patient also with sore throat.  No shortness of breath chest pain rash bloody stool or lower extremity swelling.    History provided by:  Patient  URI  Presenting symptoms: congestion, cough, fever and sore throat    Associated symptoms: arthralgias and headaches    Associated symptoms: no neck pain        Review of Systems   Constitutional: Positive for chills and fever.   HENT: Positive for congestion and sore throat.    Respiratory: Positive for cough. Negative for shortness of breath.    Cardiovascular: Negative for chest pain.   Gastrointestinal: Positive for diarrhea, nausea and vomiting.   Musculoskeletal: Positive for arthralgias. Negative for neck pain and neck stiffness.   Neurological: Positive for headaches.       Past Medical History:   Diagnosis Date   • Anxiety    • Migraines        No Known Allergies    Past Surgical History:   Procedure Laterality Date   •  SECTION  2018   • TEETH EXTRACTION  2019    All top teeth removed- wears dentures   • TUBAL ABDOMINAL LIGATION  2018       Family History   Problem Relation Age of Onset   • Anxiety disorder Mother    • Heart disease Father    • Hypertension Father    • Diverticulitis Father    • Autism Sister    • No Known Problems Daughter    • No Known Problems Son        Social History     Socioeconomic History   • Marital status:    Tobacco Use   • Smoking status: Former     Packs/day: 0.25     Years: 1.00     Pack years: 0.25     Types: Cigarettes     Start date: 2014     Quit date: 2014     Years since quittin.0   • Smokeless tobacco: Never   Vaping Use   • Vaping Use: Never used   Substance and  Sexual Activity   • Alcohol use: Not Currently   • Drug use: Never   • Sexual activity: Yes     Birth control/protection: Tubal ligation           Objective   Physical Exam  Vitals and nursing note reviewed.   Constitutional:       Appearance: Normal appearance.   HENT:      Head: Normocephalic and atraumatic.      Nose: Nose normal.      Mouth/Throat:      Mouth: Mucous membranes are moist.   Eyes:      Extraocular Movements: Extraocular movements intact.      Pupils: Pupils are equal, round, and reactive to light.   Cardiovascular:      Rate and Rhythm: Normal rate and regular rhythm.   Pulmonary:      Effort: Pulmonary effort is normal.      Breath sounds: Normal breath sounds.   Abdominal:      General: Abdomen is flat.      Palpations: Abdomen is soft.   Musculoskeletal:         General: Normal range of motion.      Cervical back: Normal range of motion and neck supple.   Skin:     General: Skin is warm and dry.   Neurological:      General: No focal deficit present.      Mental Status: She is alert and oriented to person, place, and time.   Psychiatric:         Mood and Affect: Mood normal.         Behavior: Behavior normal.         Procedures           ED Course      Lab Results (last 24 hours)     Procedure Component Value Units Date/Time    COVID-19 and FLU A/B PCR - Swab, Nasopharynx [[id number]]  (Abnormal) Collected: 12/25/22 0757    Specimen: Swab from Nasopharynx Updated: 12/25/22 0825     COVID19 Not Detected     Influenza A PCR Detected     Influenza B PCR Not Detected    Narrative:      Fact sheet for providers: https://www.fda.gov/media/527885/download    Fact sheet for patients: https://www.fda.gov/media/758908/download    Test performed by PCR.    Urinalysis With Microscopic If Indicated (No Culture) - Urine, Clean Catch [[id number]]  (Abnormal) Collected: 12/25/22 0811    Specimen: Urine, Clean Catch Updated: 12/25/22 0823     Color, UA Dark Yellow     Appearance, UA Slightly Cloudy     pH, UA 6.0      Specific Gravity, UA 1.031     Comment: Result obtained by Refractometer        Glucose, UA Negative     Ketones, UA 40 mg/dL (2+)     Bilirubin, UA Small (1+)     Blood, UA Negative     Protein, UA >=300 mg/dL (3+)     Leuk Esterase, UA Negative     Nitrite, UA Negative     Urobilinogen, UA 2.0 E.U./dL    Pregnancy, Urine - Urine, Clean Catch [[id number]]  (Normal) Collected: 12/25/22 0811    Specimen: Urine, Clean Catch Updated: 12/25/22 0824     HCG, Urine QL Negative    Urinalysis, Microscopic Only - Urine, Clean Catch [[id number]]  (Abnormal) Collected: 12/25/22 0811    Specimen: Urine, Clean Catch Updated: 12/25/22 0836     RBC, UA 0-2 /HPF      WBC, UA 0-2 /HPF      Bacteria, UA None Seen /HPF      Squamous Epithelial Cells, UA 3-6 /HPF      Hyaline Casts, UA 0-2 /LPF      Methodology Manual Light Microscopy                                             MDM   Patient presents emergency department with worsening headache history of migraines suspected viral infection similar to family as well as dehydration with vomiting for the last 48 hours.  Afebrile, not tachycardic hemodynamically stable plan IV fluids Toradol Zofran and reassess patient.    Flu positive, ketones present.  Patient improved after Toradol Zofran IV fluids.  Return precautions provided.  Zofran prescribed for home.    Final diagnoses:   Influenza A   Dehydration   History of migraine       ED Disposition  ED Disposition     ED Disposition   Discharge    Condition   Stable    Comment   --             Karen Bridges, APRN  [address] [facility] [place] [facility]  [phone]    Call   As needed         Medication List      New Prescriptions    ondansetron ODT 4 MG disintegrating tablet  Commonly known as: ZOFRAN-ODT  Place 1 tablet on the tongue Every 8 (Eight) Hours As Needed for Nausea or Vomiting.           Where to Get Your Medications      These medications were sent to [facility] - [place], [address]  - [phone]  - [phone] FX  [address], [place] [address]    Phone: [phone]   · ondansetron ODT 4 MG disintegrating tablet          Meño Guerra MD  12/25/22 0877

## 2023-01-16 RX ORDER — SERTRALINE HYDROCHLORIDE 100 MG/1
100 TABLET, FILM COATED ORAL DAILY
Qty: 90 TABLET | Refills: 0 | Status: SHIPPED | OUTPATIENT
Start: 2023-01-16

## 2023-01-16 NOTE — TELEPHONE ENCOUNTER
PATIENT WANTED TO DISCUSS SUMAtriptan (Imitrex) 50 MG tableT AND SEE IF IT INTERACTED WITH OTHER MEDICATIONS SHE IS ON       Caller: Eugenia Le    Relationship: Self    Best call back number: *  [phone] (Mobile)    Requested Prescriptions:   Requested Prescriptions     Pending Prescriptions Disp Refills   • sertraline (Zoloft) 100 MG tablet 90 tablet 0     Sig: Take 1 tablet by mouth Daily.        Pharmacy where request should be sent: [facility] [address] [place] [address] [phone] [facility] [phone] FX     Additional details provided by patient:    Does the patient have less than a 3 day supply:  [x] Yes  [] No    Would you like a call back once the refill request has been completed: [x] Yes [] No    If the office needs to give you a call back, can they leave a voicemail: [] Yes [x] No    Julianne Moraes Rep   01/16/23 12:20 EST

## 2023-01-18 ENCOUNTER — TELEPHONE (OUTPATIENT)
Dept: ONCOLOGY | Facility: CLINIC | Age: 28
End: 2023-01-18

## 2023-01-18 NOTE — TELEPHONE ENCOUNTER
Caller: Eugenia Le    Relationship: Self    Best call back number: [phone]    What was the call regarding: EUGENIA CALLED TO SAY THAT SHE THINKS SHE IS SUPPOSED TO COME IN SOON FOR HER LABS, BUT IS NOT SURE. SHE IS WANTING TO KNOW WHAT SHE NEEDS TO DO AND WHEN.    Do you require a callback: YES

## 2023-01-19 ENCOUNTER — TELEPHONE (OUTPATIENT)
Dept: ONCOLOGY | Facility: CLINIC | Age: 28
End: 2023-01-19
Payer: MEDICAID

## 2023-01-19 DIAGNOSIS — D50.9 IRON DEFICIENCY ANEMIA, UNSPECIFIED IRON DEFICIENCY ANEMIA TYPE: Primary | ICD-10-CM

## 2023-01-19 NOTE — TELEPHONE ENCOUNTER
Pt returned my call to schedule a f/u in [place]. Appt was made. Pt v/u of appt dates and times. She had no other questions.

## 2023-01-19 NOTE — TELEPHONE ENCOUNTER
Attempted to call the pt and make a follow up appt. No answer. V/m was left with call back information.

## 2023-01-23 ENCOUNTER — TELEPHONE (OUTPATIENT)
Dept: FAMILY MEDICINE CLINIC | Facility: CLINIC | Age: 28
End: 2023-01-23
Payer: MEDICAID

## 2023-01-23 NOTE — TELEPHONE ENCOUNTER
Caller: Eugenia Le    Relationship to patient: Self    Best call back number: [phone]    Patient is needing: PATIENT SET UP APPT WITH PROVIDER FOR NEXT AVAILABLE ON 2/14 FOR A FOLLOW UP ON THE BUMPS SHE HAS ON HER LEGS. THEY HAVE BECOME MORE PAINFUL AND WOULD LIKE TO BE WORKED IN SOONER IF POSSIBLE. SHE WILL SEE ANY PROVIDER. PLEASE CALL AND ADVISE.

## 2023-01-27 ENCOUNTER — OFFICE VISIT (OUTPATIENT)
Dept: FAMILY MEDICINE CLINIC | Facility: CLINIC | Age: 28
End: 2023-01-27
Payer: MEDICAID

## 2023-01-27 VITALS
OXYGEN SATURATION: 100 % | WEIGHT: 152.6 LBS | RESPIRATION RATE: 18 BRPM | HEIGHT: 57 IN | SYSTOLIC BLOOD PRESSURE: 130 MMHG | TEMPERATURE: 97.9 F | HEART RATE: 81 BPM | DIASTOLIC BLOOD PRESSURE: 90 MMHG | BODY MASS INDEX: 32.92 KG/M2

## 2023-01-27 DIAGNOSIS — G43.109 MIGRAINE WITH AURA AND WITHOUT STATUS MIGRAINOSUS, NOT INTRACTABLE: ICD-10-CM

## 2023-01-27 DIAGNOSIS — G25.81 RLS (RESTLESS LEGS SYNDROME): Primary | ICD-10-CM

## 2023-01-27 DIAGNOSIS — D50.8 IRON DEFICIENCY ANEMIA SECONDARY TO INADEQUATE DIETARY IRON INTAKE: ICD-10-CM

## 2023-01-27 DIAGNOSIS — E66.9 OBESITY (BMI 30.0-34.9): ICD-10-CM

## 2023-01-27 DIAGNOSIS — R11.0 NAUSEA: ICD-10-CM

## 2023-01-27 PROCEDURE — 99214 OFFICE O/P EST MOD 30 MIN: CPT | Performed by: FAMILY MEDICINE

## 2023-01-27 PROCEDURE — T1015 CLINIC SERVICE: HCPCS | Performed by: FAMILY MEDICINE

## 2023-01-27 RX ORDER — TOPIRAMATE 50 MG/1
TABLET, FILM COATED ORAL
Qty: 60 TABLET | Refills: 2 | Status: SHIPPED | OUTPATIENT
Start: 2023-01-27

## 2023-01-27 RX ORDER — LEVOMEFOLATE/ALGAL OIL 15-90.314
CAPSULE ORAL DAILY
COMMUNITY
Start: 2023-01-12

## 2023-01-27 RX ORDER — ONDANSETRON 4 MG/1
4 TABLET, ORALLY DISINTEGRATING ORAL EVERY 8 HOURS PRN
Qty: 30 TABLET | Refills: 0 | Status: SHIPPED | OUTPATIENT
Start: 2023-01-27 | End: 2023-02-04

## 2023-01-27 RX ORDER — ROPINIROLE 0.5 MG/1
0.5 TABLET, FILM COATED ORAL NIGHTLY
Qty: 30 TABLET | Refills: 2 | Status: SHIPPED | OUTPATIENT
Start: 2023-01-27

## 2023-01-30 RX ORDER — FERROUS GLUCONATE 324(37.5)
324 TABLET ORAL
Qty: 30 TABLET | Refills: 5 | Status: SHIPPED | OUTPATIENT
Start: 2023-01-30

## 2023-01-30 RX ORDER — OMEPRAZOLE 40 MG/1
40 CAPSULE, DELAYED RELEASE ORAL DAILY
Qty: 90 CAPSULE | Refills: 0 | Status: SHIPPED | OUTPATIENT
Start: 2023-01-30 | End: 2023-04-30

## 2023-02-03 ENCOUNTER — HOSPITAL ENCOUNTER (EMERGENCY)
Facility: HOSPITAL | Age: 28
Discharge: HOME | End: 2023-02-04
Attending: EMERGENCY MEDICINE | Admitting: EMERGENCY MEDICINE
Payer: MEDICAID

## 2023-02-03 VITALS
OXYGEN SATURATION: 98 % | DIASTOLIC BLOOD PRESSURE: 97 MMHG | BODY MASS INDEX: 32.79 KG/M2 | WEIGHT: 152 LBS | HEART RATE: 95 BPM | SYSTOLIC BLOOD PRESSURE: 139 MMHG | TEMPERATURE: 97.9 F | HEIGHT: 57 IN | RESPIRATION RATE: 16 BRPM

## 2023-02-03 DIAGNOSIS — G43.109 MIGRAINE WITH AURA AND WITHOUT STATUS MIGRAINOSUS, NOT INTRACTABLE: Primary | ICD-10-CM

## 2023-02-03 PROCEDURE — 25010000002 KETOROLAC TROMETHAMINE PER 15 MG: Performed by: EMERGENCY MEDICINE

## 2023-02-03 PROCEDURE — 25010000002 METHYLPREDNISOLONE PER 125 MG: Performed by: EMERGENCY MEDICINE

## 2023-02-03 PROCEDURE — 25010000002 ONDANSETRON PER 1 MG: Performed by: EMERGENCY MEDICINE

## 2023-02-03 PROCEDURE — 99283 EMERGENCY DEPT VISIT LOW MDM: CPT

## 2023-02-03 PROCEDURE — 96374 THER/PROPH/DIAG INJ IV PUSH: CPT

## 2023-02-03 PROCEDURE — 96375 TX/PRO/DX INJ NEW DRUG ADDON: CPT

## 2023-02-03 RX ORDER — KETOROLAC TROMETHAMINE 30 MG/ML
30 INJECTION, SOLUTION INTRAMUSCULAR; INTRAVENOUS ONCE
Status: COMPLETED | OUTPATIENT
Start: 2023-02-03 | End: 2023-02-03

## 2023-02-03 RX ORDER — ONDANSETRON 2 MG/ML
4 INJECTION INTRAMUSCULAR; INTRAVENOUS ONCE
Status: COMPLETED | OUTPATIENT
Start: 2023-02-03 | End: 2023-02-03

## 2023-02-03 RX ORDER — SODIUM CHLORIDE 0.9 % (FLUSH) 0.9 %
10 SYRINGE (ML) INJECTION AS NEEDED
Status: DISCONTINUED | OUTPATIENT
Start: 2023-02-03 | End: 2023-02-04 | Stop reason: HOSPADM

## 2023-02-03 RX ORDER — METHYLPREDNISOLONE SODIUM SUCCINATE 125 MG/2ML
125 INJECTION, POWDER, LYOPHILIZED, FOR SOLUTION INTRAMUSCULAR; INTRAVENOUS ONCE
Status: COMPLETED | OUTPATIENT
Start: 2023-02-03 | End: 2023-02-03

## 2023-02-03 RX ADMIN — KETOROLAC TROMETHAMINE 30 MG: 30 INJECTION, SOLUTION INTRAMUSCULAR; INTRAVENOUS at 23:57

## 2023-02-03 RX ADMIN — ONDANSETRON 4 MG: 2 INJECTION INTRAMUSCULAR; INTRAVENOUS at 23:57

## 2023-02-03 RX ADMIN — METHYLPREDNISOLONE SODIUM SUCCINATE 125 MG: 125 INJECTION, POWDER, FOR SOLUTION INTRAMUSCULAR; INTRAVENOUS at 23:57

## 2023-02-04 RX ORDER — ONDANSETRON 4 MG/1
4 TABLET, ORALLY DISINTEGRATING ORAL EVERY 6 HOURS PRN
Qty: 15 TABLET | Refills: 0 | Status: SHIPPED | OUTPATIENT
Start: 2023-02-04

## 2023-02-04 RX ORDER — KETOROLAC TROMETHAMINE 10 MG/1
10 TABLET, FILM COATED ORAL EVERY 6 HOURS PRN
Qty: 15 TABLET | Refills: 0 | Status: SHIPPED | OUTPATIENT
Start: 2023-02-04 | End: 2023-02-28 | Stop reason: SDUPTHER

## 2023-02-04 NOTE — ED PROVIDER NOTES
Subjective   History of Present Illness  Eugenia Le is a 27-year-old white female who present secondary to migraine headache.  Patient had onset of right-sided migraine approximately 6 PM this evening.  Patient initially tried Tylenol without any improvement.  Approximately 8 PM the headache worsened.  Associated visual aura, photophobia and multiple episodes of vomiting.  Patient states now she is dry heaving.  A few bouts of diarrhea have also been present.  Patient took Imitrex and Topamax without any improvement of headache.  Thus she elected to come to the ED for evaluation.     History provided by:  Patient      Review of Systems   Constitutional: Positive for chills. Negative for fever.   HENT: Negative for rhinorrhea.    Eyes: Positive for photophobia. Negative for redness.   Respiratory: Negative for cough.    Cardiovascular: Negative for chest pain.   Gastrointestinal: Positive for diarrhea, nausea and vomiting. Negative for abdominal pain.   Endocrine: Negative.    Genitourinary: Negative.  Negative for difficulty urinating.   Musculoskeletal: Negative.  Negative for back pain.   Skin: Negative.  Negative for color change.   Neurological: Positive for headaches. Negative for syncope.   Hematological: Negative.    Psychiatric/Behavioral: Negative.    All other systems reviewed and are negative.      Past Medical History:   Diagnosis Date   • Anxiety    • Migraines        No Known Allergies    Past Surgical History:   Procedure Laterality Date   •  SECTION  2018   • TEETH EXTRACTION  2019    All top teeth removed- wears dentures   • TUBAL ABDOMINAL LIGATION  2018       Family History   Problem Relation Age of Onset   • Anxiety disorder Mother    • Heart disease Father    • Hypertension Father    • Diverticulitis Father    • Autism Sister    • No Known Problems Daughter    • No Known Problems Son    • Diabetes Paternal Uncle        Social History     Socioeconomic History   • Marital status:     Tobacco Use   • Smoking status: Former     Packs/day: 0.25     Years: 1.00     Pack years: 0.25     Types: Cigarettes     Start date: 2014     Quit date: 2014     Years since quittin.1     Passive exposure: Past   • Smokeless tobacco: Never   Vaping Use   • Vaping Use: Never used   Substance and Sexual Activity   • Alcohol use: Not Currently   • Drug use: Never   • Sexual activity: Yes     Birth control/protection: Tubal ligation           Objective   Physical Exam  Vitals and nursing note reviewed.   Constitutional:       General: She is not in acute distress.     Appearance: Normal appearance. She is well-developed. She is not ill-appearing, toxic-appearing or diaphoretic.      Comments: 27-year-old white female laying in bed.  Overhead lights are off.  Emesis bag x2 at hand.  Patient is a bit overweight.  She otherwise appears in good overall health.  Vital signs notable for mildly elevated diastolic pressure at 97.  Systolic 139.  Otherwise unremarkable.  Patient friendly and cooperative.   HENT:      Head: Normocephalic and atraumatic.      Right Ear: Tympanic membrane, ear canal and external ear normal.      Left Ear: Tympanic membrane, ear canal and external ear normal.      Nose: Nose normal.      Mouth/Throat:      Mouth: Mucous membranes are moist.      Pharynx: Oropharynx is clear.   Eyes:      Extraocular Movements: Extraocular movements intact.      Conjunctiva/sclera: Conjunctivae normal.      Pupils: Pupils are equal, round, and reactive to light.   Cardiovascular:      Rate and Rhythm: Normal rate and regular rhythm.      Pulses: Normal pulses.      Heart sounds: Normal heart sounds. No murmur heard.    No friction rub. No gallop.   Pulmonary:      Effort: Pulmonary effort is normal.      Breath sounds: Normal breath sounds.   Abdominal:      General: Bowel sounds are normal. There is no distension.      Palpations: Abdomen is soft. There is no mass.      Tenderness: There is no  abdominal tenderness. There is no guarding or rebound.      Hernia: No hernia is present.   Musculoskeletal:         General: Normal range of motion.      Cervical back: Normal range of motion and neck supple.   Skin:     General: Skin is warm and dry.      Capillary Refill: Capillary refill takes less than 2 seconds.   Neurological:      General: No focal deficit present.      Mental Status: She is alert and oriented to person, place, and time.      Deep Tendon Reflexes: Reflexes are normal and symmetric.   Psychiatric:         Mood and Affect: Mood normal.         Behavior: Behavior normal.         Procedures           ED Course  ED Course as of 02/04/23 0054   Fri Feb 03, 2023   2343 Patient drove her self to the hospital.  Driving herself home.  This obviously limits treatment options.  Giving Toradol, Solu-Medrol and Zofran for patient's migraine. [SS]   Sat Feb 04, 2023   0051 Patient was asleep in bed.  Awake and for reevaluation.  Ms. Le states she feels better.  Headache has improved.  Nausea improved.  I will prescribe Toradol and Zofran for home.  Will DC home.    Prescription  1-Toradol  2-Zofran [SS]      ED Course User Index  [SS] Bill Lutz MD      My differential diagnosis for headache includes but is not limited to:  Migraine, cluster, ischemic stroke, subarachnoid hemorrhage, intracranial hemorrhage, vascular malformation, cerebral aneurysm, vascular dissection, vasculitis, temporal arteritis, malignant hypertension, pheochromocytoma, cerebral venous thrombosis, preeclampsia; bacterial meningitis, viral meningitis, fungal meningitis, encephalitis, brain abscess, pleural empyema, sinusitis, dental infection, influenza, viral syndrome; carbon monoxide exposure, analgesic abuse, hypoglycemia; trigeminal neuralgia, postherpetic neuralgia, occipital neuralgia; subdural hematoma, concussion, musculoskeletal tension, cervical osteoarthritis; glaucoma, TMJ disease, pseudotumor cerebri, post LP  headache, intracranial neoplasm, sleep apnea                                       MDM    Final diagnoses:   Migraine with aura and without status migrainosus, not intractable       ED Disposition  ED Disposition     ED Disposition   Discharge    Condition   Stable    Comment   --             Karen Bridges, APRN  [address] [facility] [place] [address] [phone]    Schedule an appointment as soon as possible for a visit in 1 week  for continued or worsened symptoms, Sooner if needed         Medication List      New Prescriptions    ketorolac 10 MG tablet  Commonly known as: TORADOL  Take 1 tablet by mouth Every 6 (Six) Hours As Needed (Migraine headache) for up to 15 doses.     ondansetron ODT 4 MG disintegrating tablet  Commonly known as: ZOFRAN-ODT  Place 1 tablet on the tongue Every 6 (Six) Hours As Needed for Nausea or Vomiting for up to 15 doses.           Where to Get Your Medications      These medications were sent to [facility] [address] [place] [address] - [address] - [phone]  - [phone] FX  [address] [facility] [place] [address]    Phone: [phone]   · ketorolac 10 MG tablet  · ondansetron ODT 4 MG disintegrating tablet          Bill Lutz MD  02/04/23 0054

## 2023-02-06 RX ORDER — ERGOCALCIFEROL 1.25 MG/1
50000 CAPSULE ORAL
Qty: 8 CAPSULE | Refills: 0 | Status: SHIPPED | OUTPATIENT
Start: 2023-02-06

## 2023-02-09 ENCOUNTER — CLINICAL SUPPORT (OUTPATIENT)
Dept: FAMILY MEDICINE CLINIC | Facility: CLINIC | Age: 28
End: 2023-02-09
Payer: MEDICAID

## 2023-02-09 DIAGNOSIS — D50.9 IRON DEFICIENCY ANEMIA, UNSPECIFIED IRON DEFICIENCY ANEMIA TYPE: ICD-10-CM

## 2023-02-09 LAB
BASOPHILS # BLD AUTO: 0 10*3/MM3 (ref 0–0.2)
BASOPHILS NFR BLD AUTO: 0.5 % (ref 0–1.5)
DEPRECATED RDW RBC AUTO: 45.9 FL (ref 37–54)
EOSINOPHIL # BLD AUTO: 0.3 10*3/MM3 (ref 0–0.4)
EOSINOPHIL NFR BLD AUTO: 3.8 % (ref 0.3–6.2)
ERYTHROCYTE [DISTWIDTH] IN BLOOD BY AUTOMATED COUNT: 13.9 % (ref 12.3–15.4)
FERRITIN SERPL-MCNC: 68.15 NG/ML (ref 13–150)
HCT VFR BLD AUTO: 43 % (ref 34–46.6)
HGB BLD-MCNC: 14.4 G/DL (ref 12–15.9)
IRON 24H UR-MRATE: 72 MCG/DL (ref 37–145)
IRON SATN MFR SERPL: 18 % (ref 20–50)
LYMPHOCYTES # BLD AUTO: 2.2 10*3/MM3 (ref 0.7–3.1)
LYMPHOCYTES NFR BLD AUTO: 29.3 % (ref 19.6–45.3)
MCH RBC QN AUTO: 30 PG (ref 26.6–33)
MCHC RBC AUTO-ENTMCNC: 33.4 G/DL (ref 31.5–35.7)
MCV RBC AUTO: 89.9 FL (ref 79–97)
MONOCYTES # BLD AUTO: 0.6 10*3/MM3 (ref 0.1–0.9)
MONOCYTES NFR BLD AUTO: 7.9 % (ref 5–12)
NEUTROPHILS NFR BLD AUTO: 4.4 10*3/MM3 (ref 1.7–7)
NEUTROPHILS NFR BLD AUTO: 58.5 % (ref 42.7–76)
NRBC BLD AUTO-RTO: 0 /100 WBC (ref 0–0.2)
PLATELET # BLD AUTO: 317 10*3/MM3 (ref 140–450)
PMV BLD AUTO: 8.9 FL (ref 6–12)
RBC # BLD AUTO: 4.79 10*6/MM3 (ref 3.77–5.28)
TIBC SERPL-MCNC: 402 MCG/DL (ref 298–536)
TRANSFERRIN SERPL-MCNC: 270 MG/DL (ref 200–360)
WBC NRBC COR # BLD: 7.5 10*3/MM3 (ref 3.4–10.8)

## 2023-02-09 PROCEDURE — 83540 ASSAY OF IRON: CPT | Performed by: INTERNAL MEDICINE

## 2023-02-09 PROCEDURE — 85025 COMPLETE CBC W/AUTO DIFF WBC: CPT | Performed by: INTERNAL MEDICINE

## 2023-02-09 PROCEDURE — 82728 ASSAY OF FERRITIN: CPT | Performed by: INTERNAL MEDICINE

## 2023-02-09 PROCEDURE — 84466 ASSAY OF TRANSFERRIN: CPT | Performed by: INTERNAL MEDICINE

## 2023-02-09 PROCEDURE — 36415 COLL VENOUS BLD VENIPUNCTURE: CPT | Performed by: INTERNAL MEDICINE

## 2023-02-13 PROBLEM — E66.811 OBESITY (BMI 30.0-34.9): Status: ACTIVE | Noted: 2023-02-13

## 2023-02-13 PROBLEM — F41.9 ANXIETY: Status: ACTIVE | Noted: 2023-02-13

## 2023-02-13 PROBLEM — D50.9 IRON DEFICIENCY ANEMIA: Status: ACTIVE | Noted: 2023-02-13

## 2023-02-13 PROBLEM — E66.9 OBESITY (BMI 30.0-34.9): Status: ACTIVE | Noted: 2023-02-13

## 2023-02-14 DIAGNOSIS — G43.111 INTRACTABLE MIGRAINE WITH AURA WITH STATUS MIGRAINOSUS: ICD-10-CM

## 2023-02-14 NOTE — PROGRESS NOTES
HEMATOLOGY ONCOLOGY OUTPATIENT FOLLOW UP       Patient name: Eugenia Le  : 1995  MRN: [id number]  Primary Care Physician: Karen Bridges APRN  Referring Physician: Karen Bridges AP*  Reason For Consult: Latent iron deficiency      History of Present Illness:    Patient is a 27 y.o. female with no significant past medical history who was seen by her primary care for routine visit.  This showed low iron saturation patient was referred for further work-up and treatment.    Review of past labs  2022 -WBC 6.8, hemoglobin 13.4, hematocrit 40.2, platelet 338  2022 -WBC 9.65, hemoglobin 12.7, hematocrit 39.1, platelet 302  Vitamin B12 level normal, ferritin 26, iron saturation 10, TIBC 425  Continued on oral iron    2023 - WBC 7.50, Hb 14.4, plt 317, iron sat 18, ferritin 68.15    Subjective:  Patient seen for initial consultation. No new symptoms.     Past Medical History:   Diagnosis Date   • Anxiety    • Migraines        Past Surgical History:   Procedure Laterality Date   •  SECTION  2018   • TEETH EXTRACTION  2019    All top teeth removed- wears dentures   • TUBAL ABDOMINAL LIGATION  2018         Current Outpatient Medications:   •  busPIRone (BUSPAR) 5 MG tablet, Take 1 tablet by mouth Every Night. May also take 1 tablet 2 (Two) Times a Day As Needed (anxiety)., Disp: 90 tablet, Rfl: 1  •  ferrous gluconate 324 (37.5 Fe) MG tablet tablet, Take 1 tablet by mouth Daily With Breakfast., Disp: 30 tablet, Rfl: 5  •  ketorolac (TORADOL) 10 MG tablet, Take 1 tablet by mouth Every 6 (Six) Hours As Needed (Migraine headache) for up to 15 doses., Disp: 15 tablet, Rfl: 0  •  L-Methylfolate-Algae (L-Methylfolate Forte) 15-90.314 MG capsule capsule, Daily., Disp: , Rfl:   •  omeprazole (priLOSEC) 40 MG capsule, Take 1 capsule by mouth Daily for 90 days., Disp: 90 capsule, Rfl: 0  •  ondansetron ODT (ZOFRAN-ODT) 4 MG disintegrating tablet, Place 1 tablet on the  "tongue Every 6 (Six) Hours As Needed for Nausea or Vomiting for up to 15 doses., Disp: 15 tablet, Rfl: 0  •  rOPINIRole (REQUIP) 0.5 MG tablet, Take 1 tablet by mouth Every Night. Take 1 hour before bedtime., Disp: 30 tablet, Rfl: 2  •  sertraline (Zoloft) 100 MG tablet, Take 1 tablet by mouth Daily., Disp: 90 tablet, Rfl: 0  •  SUMAtriptan (Imitrex) 50 MG tablet, Take one tablet at onset of headache. May repeat dose one time in 2 hours if headache not relieved., Disp: 9 tablet, Rfl: 3  •  topiramate (TOPAMAX) 50 MG tablet, Take 1/2 tablet nightly for one week then 1/2 tablet twice daily for one week then 1/2 tablet in the AM and 1 tablet in the Pm for one week than 1 tablet twice daily., Disp: 60 tablet, Rfl: 2  •  vitamin D (ERGOCALCIFEROL) 1.25 MG (38229 UT) capsule capsule, Take 1 capsule by mouth Every 7 (Seven) Days., Disp: 8 capsule, Rfl: 0    No Known Allergies    Family History   Problem Relation Age of Onset   • Anxiety disorder Mother    • Heart disease Father    • Hypertension Father    • Diverticulitis Father    • Autism Sister    • No Known Problems Daughter    • No Known Problems Son    • Diabetes Paternal Uncle        Cancer-related family history is not on file.      Social History     Tobacco Use   • Smoking status: Former     Packs/day: 0.25     Years: 1.00     Pack years: 0.25     Types: Cigarettes     Start date: 2014     Quit date: 2014     Years since quittin.2     Passive exposure: Past   • Smokeless tobacco: Never   Vaping Use   • Vaping Use: Never used   Substance Use Topics   • Alcohol use: Not Currently   • Drug use: Never     Social History     Social History Narrative   • Not on file         Objective:    Vital Signs:  Vitals:    23 1312   BP: 121/77   Pulse: 64   Resp: 16   Temp: 98.1 °F (36.7 °C)   SpO2: 98%   Weight: 69.2 kg (152 lb 9.6 oz)   Height: 144.8 cm (57\")   PainSc: 0-No pain     Body mass index is 33.02 kg/m².    ECOG  (0) Fully active, able to carry on " all predisease performance without restriction    Physical Exam:   Physical Exam  Constitutional:       Appearance: Normal appearance.   HENT:      Head: Normocephalic and atraumatic.   Eyes:      Pupils: Pupils are equal, round, and reactive to light.   Cardiovascular:      Rate and Rhythm: Normal rate and regular rhythm.      Pulses: Normal pulses.      Heart sounds: No murmur heard.  Pulmonary:      Effort: Pulmonary effort is normal.      Breath sounds: Normal breath sounds.   Abdominal:      General: There is no distension.      Palpations: Abdomen is soft. There is no mass.      Tenderness: There is no abdominal tenderness.   Musculoskeletal:         General: Normal range of motion.      Cervical back: Normal range of motion and neck supple.   Skin:     General: Skin is warm.   Neurological:      General: No focal deficit present.      Mental Status: She is alert.   Psychiatric:         Mood and Affect: Mood normal.       Lab Results - Last 18 Months   Lab Units 02/09/23  1118 11/03/22  1400 09/22/22  1340   WBC 10*3/mm3 7.50 6.60 9.65   HEMOGLOBIN g/dL 14.4 13.1 12.7   HEMATOCRIT % 43.0 40.6 39.1   PLATELETS 10*3/mm3 317 325 302   MCV fL 89.9 90.8 85.9     Lab Results - Last 18 Months   Lab Units 11/03/22  1400 09/22/22  1340 06/24/22  1354   SODIUM mmol/L 140 136 140   POTASSIUM mmol/L 3.9 3.6 4.1   CHLORIDE mmol/L 103 101 102   TOTAL CO2 mmol/L  --   --  22   CO2 mmol/L 25.7 22.5  --    BUN mg/dL 5* 6 6   CREATININE mg/dL 0.81 0.79 0.91   CALCIUM mg/dL 9.8 9.8 9.8   BILIRUBIN mg/dL 0.3 0.2 0.3   ALK PHOS U/L 86 83 93   ALT (SGPT) U/L 40* 14 13   AST (SGOT) U/L 30 19 23   GLUCOSE mg/dL 77 87 85       Lab Results   Component Value Date    GLUCOSE 77 11/03/2022    BUN 5 (L) 11/03/2022    CREATININE 0.81 11/03/2022    BCR 6.2 (L) 11/03/2022    K 3.9 11/03/2022    CO2 25.7 11/03/2022    CALCIUM 9.8 11/03/2022    PROTENTOTREF 8.2 06/24/2022    ALBUMIN 4.70 11/03/2022    LABIL2 1.5 06/24/2022    AST 30 11/03/2022     ALT 40 (H) 11/03/2022       Lab Results - Last 18 Months   Lab Units 11/04/22  1138 11/03/22  1400   INR  0.95 4.48*       Lab Results   Component Value Date    IRON 72 02/09/2023    TIBC 402 02/09/2023    FERRITIN 68.15 02/09/2023       No results found for: FOLATE    No results found for: OCCULTBLD    No results found for: RETICCTPCT  Lab Results   Component Value Date    CTUTIBHZ64 717 11/03/2022     No results found for: SPEP, UPEP  No results found for: LDH, URICACID  No results found for: MICHAEL, RF, SEDRATE  No results found for: FIBRINOGEN, HAPTOGLOBIN  Lab Results   Component Value Date    INR 0.95 11/04/2022     No results found for:   No results found for: CEA  No components found for: CA-19-9  No results found for: PSA  No results found for: SEDRATE       Assessment & Plan     Patient is a 27-year-old female with latent iron deficiency    Latent iron deficiency  Patient's hemoglobin is normal at 12.7, iron saturation is low at 9 this goes along with latent iron deficiency not causing anemia yet  Etiology here is likely menorrhagia.  Continue oral iron, improvement in her iron sat to 18 and hb to 14.4    I will follow up in 4 months cbc iron studies week prior        Thank you very much for providing the opportunity to participate in this patient’s care. Please do not hesitate to call if there are any other questions.

## 2023-02-15 RX ORDER — SUMATRIPTAN 50 MG/1
TABLET, FILM COATED ORAL
Qty: 9 TABLET | Refills: 3 | Status: SHIPPED | OUTPATIENT
Start: 2023-02-15 | End: 2023-03-17 | Stop reason: SDUPTHER

## 2023-02-16 ENCOUNTER — OFFICE VISIT (OUTPATIENT)
Dept: ONCOLOGY | Facility: CLINIC | Age: 28
End: 2023-02-16
Payer: MEDICAID

## 2023-02-16 VITALS
OXYGEN SATURATION: 98 % | HEART RATE: 64 BPM | DIASTOLIC BLOOD PRESSURE: 77 MMHG | SYSTOLIC BLOOD PRESSURE: 121 MMHG | RESPIRATION RATE: 16 BRPM | HEIGHT: 57 IN | WEIGHT: 152.6 LBS | BODY MASS INDEX: 32.92 KG/M2 | TEMPERATURE: 98.1 F

## 2023-02-16 DIAGNOSIS — D50.9 IRON DEFICIENCY ANEMIA, UNSPECIFIED IRON DEFICIENCY ANEMIA TYPE: Primary | ICD-10-CM

## 2023-02-16 PROCEDURE — 99213 OFFICE O/P EST LOW 20 MIN: CPT | Performed by: INTERNAL MEDICINE

## 2023-02-22 ENCOUNTER — TELEPHONE (OUTPATIENT)
Dept: FAMILY MEDICINE CLINIC | Facility: CLINIC | Age: 28
End: 2023-02-22

## 2023-02-22 NOTE — TELEPHONE ENCOUNTER
Caller: Eugenia Le    Relationship: Self    Best call back number: [phone]    PATIENT HAS QUESTIONS ABOUT IRREGULARITIES IN HER MENSTRUAL CYCLE. PLEASE CALL AND ADVISE.

## 2023-02-22 NOTE — TELEPHONE ENCOUNTER
Patient has more details written in a my-chart message below. Should we get her scheduled to be seen? Please advise, thank you.    Patient Message with Karen Bridges APRN (02/22/2023)

## 2023-02-23 ENCOUNTER — TELEPHONE (OUTPATIENT)
Dept: FAMILY MEDICINE CLINIC | Facility: CLINIC | Age: 28
End: 2023-02-23
Payer: MEDICAID

## 2023-02-23 NOTE — TELEPHONE ENCOUNTER
I spoke with pt regarding her concerns about her irregular menstrual cycles and was able to get her scheduled for 2/28/23 at 11:30am with Dr. Brown. Pt states she will have spotting once day and heavy bleeding the next. She also informed me that there are days where she puts on a pad and does not have to change it at all due to no bleeding. Pt expressed her concerns about having a pelvic exam done and informed me that she did not feel comfortable having one. I informed pt that she could always deny one IF that was the rout the provider decided to go and informed her of patients rights. Pt informed me that she use to see an OBGYN at [facility] however, is no longer in their district and they will not see her. Pt is looking for another OBGYN to possibly discuss this matter further.

## 2023-02-28 ENCOUNTER — OFFICE VISIT (OUTPATIENT)
Dept: FAMILY MEDICINE CLINIC | Facility: CLINIC | Age: 28
End: 2023-02-28
Payer: MEDICAID

## 2023-02-28 VITALS
OXYGEN SATURATION: 100 % | HEIGHT: 59 IN | BODY MASS INDEX: 31.04 KG/M2 | TEMPERATURE: 98.8 F | WEIGHT: 154 LBS | SYSTOLIC BLOOD PRESSURE: 117 MMHG | DIASTOLIC BLOOD PRESSURE: 79 MMHG | RESPIRATION RATE: 16 BRPM | HEART RATE: 83 BPM

## 2023-02-28 DIAGNOSIS — G43.111 INTRACTABLE MIGRAINE WITH AURA WITH STATUS MIGRAINOSUS: ICD-10-CM

## 2023-02-28 DIAGNOSIS — E66.9 OBESITY (BMI 30.0-34.9): ICD-10-CM

## 2023-02-28 DIAGNOSIS — D50.9 IRON DEFICIENCY ANEMIA, UNSPECIFIED IRON DEFICIENCY ANEMIA TYPE: ICD-10-CM

## 2023-02-28 DIAGNOSIS — G25.81 RLS (RESTLESS LEGS SYNDROME): ICD-10-CM

## 2023-02-28 DIAGNOSIS — N93.8 DYSFUNCTIONAL UTERINE BLEEDING: Primary | ICD-10-CM

## 2023-02-28 LAB
BASOPHILS # BLD MANUAL: 0.08 10*3/MM3 (ref 0–0.2)
BASOPHILS NFR BLD MANUAL: 1 % (ref 0–1.5)
DEPRECATED RDW RBC AUTO: 47.7 FL (ref 37–54)
EOSINOPHIL # BLD MANUAL: 0.38 10*3/MM3 (ref 0–0.4)
EOSINOPHIL NFR BLD MANUAL: 5 % (ref 0.3–6.2)
ERYTHROCYTE [DISTWIDTH] IN BLOOD BY AUTOMATED COUNT: 14.1 % (ref 12.3–15.4)
FERRITIN SERPL-MCNC: 44.55 NG/ML (ref 13–150)
HCG INTACT+B SERPL-ACNC: <1 MIU/ML
HCT VFR BLD AUTO: 43 % (ref 34–46.6)
HGB BLD-MCNC: 14.2 G/DL (ref 12–15.9)
IRON 24H UR-MRATE: 66 MCG/DL (ref 37–145)
IRON SATN MFR SERPL: 14 % (ref 20–50)
LYMPHOCYTES # BLD MANUAL: 2.66 10*3/MM3 (ref 0.7–3.1)
LYMPHOCYTES NFR BLD MANUAL: 10 % (ref 5–12)
MCH RBC QN AUTO: 30 PG (ref 26.6–33)
MCHC RBC AUTO-ENTMCNC: 33.1 G/DL (ref 31.5–35.7)
MCV RBC AUTO: 90.6 FL (ref 79–97)
MONOCYTES # BLD: 0.76 10*3/MM3 (ref 0.1–0.9)
NEUTROPHILS # BLD AUTO: 3.72 10*3/MM3 (ref 1.7–7)
NEUTROPHILS NFR BLD MANUAL: 48 % (ref 42.7–76)
NEUTS BAND NFR BLD MANUAL: 1 % (ref 0–5)
PLAT MORPH BLD: NORMAL
PLATELET # BLD AUTO: 309 10*3/MM3 (ref 140–450)
PMV BLD AUTO: 8.8 FL (ref 6–12)
RBC # BLD AUTO: 4.74 10*6/MM3 (ref 3.77–5.28)
RBC MORPH BLD: NORMAL
SCAN SLIDE: NORMAL
TIBC SERPL-MCNC: 465 MCG/DL (ref 298–536)
TRANSFERRIN SERPL-MCNC: 312 MG/DL (ref 200–360)
VARIANT LYMPHS NFR BLD MANUAL: 35 % (ref 19.6–45.3)
WBC MORPH BLD: NORMAL
WBC NRBC COR # BLD: 7.6 10*3/MM3 (ref 3.4–10.8)

## 2023-02-28 PROCEDURE — 83002 ASSAY OF GONADOTROPIN (LH): CPT | Performed by: FAMILY MEDICINE

## 2023-02-28 PROCEDURE — 83540 ASSAY OF IRON: CPT | Performed by: INTERNAL MEDICINE

## 2023-02-28 PROCEDURE — 85007 BL SMEAR W/DIFF WBC COUNT: CPT | Performed by: INTERNAL MEDICINE

## 2023-02-28 PROCEDURE — 84702 CHORIONIC GONADOTROPIN TEST: CPT | Performed by: FAMILY MEDICINE

## 2023-02-28 PROCEDURE — 82728 ASSAY OF FERRITIN: CPT | Performed by: INTERNAL MEDICINE

## 2023-02-28 PROCEDURE — T1015 CLINIC SERVICE: HCPCS | Performed by: FAMILY MEDICINE

## 2023-02-28 PROCEDURE — 84443 ASSAY THYROID STIM HORMONE: CPT | Performed by: FAMILY MEDICINE

## 2023-02-28 PROCEDURE — 99214 OFFICE O/P EST MOD 30 MIN: CPT | Performed by: FAMILY MEDICINE

## 2023-02-28 PROCEDURE — 85025 COMPLETE CBC W/AUTO DIFF WBC: CPT | Performed by: INTERNAL MEDICINE

## 2023-02-28 PROCEDURE — 84466 ASSAY OF TRANSFERRIN: CPT | Performed by: INTERNAL MEDICINE

## 2023-02-28 PROCEDURE — 83001 ASSAY OF GONADOTROPIN (FSH): CPT | Performed by: FAMILY MEDICINE

## 2023-02-28 RX ORDER — KETOROLAC TROMETHAMINE 10 MG/1
10 TABLET, FILM COATED ORAL EVERY 6 HOURS PRN
Qty: 15 TABLET | Refills: 0 | Status: SHIPPED | OUTPATIENT
Start: 2023-02-28

## 2023-02-28 NOTE — PROGRESS NOTES
Chief Complaint   Patient presents with   • Menstrual Problem     Has always been irregular however, has worsened in the past year.    • Med Refill     Needs a refill on Toradol       Subjective   Eugenia Le is a 27 y.o. female.     History of Present Illness  Menses have recently changed (noted with last period this month).  Current menses described as a watery discharge for about 18 hours, then no bleeding for 24 hours, then followed by light bleeding for 48 hours.   Previous menses described as 3 days straight of bleeding with a heavier flow.  Days between cycles - about 30 days (right on time, this has not changed).  She does note more cramps.      New medications started prior to menstrual change: topiramate (prescribed for migraine headaches and weight loss), ropinirole (prescribed for RLS symptoms).       I have reviewed relevant past medical, family, social and surgical history for this patient.  Medications review is done by myself, with patient.    Last TSH level normal.  No abnormalities of the pelvic structures noted on CT scan November 2022.  Contraception = tubal ligation.   has not had a vasectomy.  No family history of bleeding or clotting disorders.  She denies h/o transfusion.  No bleeding complications with prior pregnancies.  Denies nosebleeds.   + h/o easy bruising - follows with heme-onc for iron def anemia thought to be secondary to menstrual bleeding.    Topirmate has reduced headache frequency to 2-3 migraines per month.  She is still requiring abortive mediations PRN.  Requests refill of toradol.    Weight has not decreased, but she has noticed changes in taste (soda tastes flat now).  Ropinirole has improved RLS symptoms.    Past Medical History :  Active Ambulatory Problems     Diagnosis Date Noted   • Migraines 12/20/2022   • Iron deficiency anemia 02/13/2023   • Anxiety 02/13/2023   • Obesity (BMI 30.0-34.9) 02/13/2023   • RLS (restless legs syndrome) 02/28/2023     Resolved  Ambulatory Problems     Diagnosis Date Noted   • No Resolved Ambulatory Problems     Past Medical History:   Diagnosis Date   • Hidradenitis suppurativa 02/23/2023       Medication List:    Current Outpatient Medications:   •  busPIRone (BUSPAR) 5 MG tablet, Take 1 tablet by mouth Every Night. May also take 1 tablet 2 (Two) Times a Day As Needed (anxiety)., Disp: 90 tablet, Rfl: 1  •  ferrous gluconate 324 (37.5 Fe) MG tablet tablet, Take 1 tablet by mouth Daily With Breakfast., Disp: 30 tablet, Rfl: 5  •  ketorolac (TORADOL) 10 MG tablet, Take 1 tablet by mouth Every 6 (Six) Hours As Needed (Migraine headache) for up to 15 doses., Disp: 15 tablet, Rfl: 0  •  L-Methylfolate-Algae (L-Methylfolate Forte) 15-90.314 MG capsule capsule, Daily., Disp: , Rfl:   •  omeprazole (priLOSEC) 40 MG capsule, Take 1 capsule by mouth Daily for 90 days., Disp: 90 capsule, Rfl: 0  •  ondansetron ODT (ZOFRAN-ODT) 4 MG disintegrating tablet, Place 1 tablet on the tongue Every 6 (Six) Hours As Needed for Nausea or Vomiting for up to 15 doses., Disp: 15 tablet, Rfl: 0  •  rOPINIRole (REQUIP) 0.5 MG tablet, Take 1 tablet by mouth Every Night. Take 1 hour before bedtime., Disp: 30 tablet, Rfl: 2  •  sertraline (Zoloft) 100 MG tablet, Take 1 tablet by mouth Daily., Disp: 90 tablet, Rfl: 0  •  SUMAtriptan (Imitrex) 50 MG tablet, Take one tablet at onset of headache. May repeat dose one time in 2 hours if headache not relieved., Disp: 9 tablet, Rfl: 3  •  topiramate (TOPAMAX) 50 MG tablet, Take 1/2 tablet nightly for one week then 1/2 tablet twice daily for one week then 1/2 tablet in the AM and 1 tablet in the Pm for one week than 1 tablet twice daily., Disp: 60 tablet, Rfl: 2  •  vitamin D (ERGOCALCIFEROL) 1.25 MG (23747 UT) capsule capsule, Take 1 capsule by mouth Every 7 (Seven) Days., Disp: 8 capsule, Rfl: 0    No Known Allergies    Social History     Tobacco Use   • Smoking status: Former     Packs/day: 0.25     Years: 1.00     Pack  "years: 0.25     Types: Cigarettes     Start date: 2014     Quit date: 2014     Years since quittin.2     Passive exposure: Past   • Smokeless tobacco: Never   Substance Use Topics   • Alcohol use: Not Currently       Review of Systems   Constitutional: Negative for unexpected weight loss.   HENT: Negative for dental problem and nosebleeds.    Respiratory: Negative for shortness of breath.    Gastrointestinal: Negative for constipation and diarrhea.   Genitourinary: Positive for menstrual problem.   Skin: Negative for pallor.   Neurological: Positive for headache. Negative for dizziness, tremors, seizures and syncope.   Hematological: Bruises/bleeds easily.       Objective   Vitals:    23 1134   BP: 117/79   BP Location: Right arm   Patient Position: Sitting   Cuff Size: Adult   Pulse: 83   Resp: 16   Temp: 98.8 °F (37.1 °C)   TempSrc: Oral   SpO2: 100%   Weight: 69.9 kg (154 lb)   Height: 149.9 cm (59\")     Body mass index is 31.1 kg/m².    Patient's last menstrual period was 2023 (exact date).  LMP 23-23    Physical Exam  Constitutional:       General: She is not in acute distress.     Appearance: Normal appearance. She is well-developed.   HENT:      Head: Normocephalic and atraumatic.   Eyes:      General: No scleral icterus.        Right eye: No discharge.         Left eye: No discharge.      Extraocular Movements: Extraocular movements intact.      Conjunctiva/sclera: Conjunctivae normal.   Cardiovascular:      Rate and Rhythm: Normal rate and regular rhythm.      Heart sounds: Normal heart sounds. No murmur heard.  Pulmonary:      Effort: Pulmonary effort is normal.      Breath sounds: Normal breath sounds.   Genitourinary:     Comments: Unable to perform pelvic examination today, as patient brought her 2 children to today's visit and she declines examination.  Musculoskeletal:         General: No swelling.      Cervical back: Normal range of motion and neck supple.      " Right lower leg: No edema.      Left lower leg: No edema.   Skin:     General: Skin is warm.      Capillary Refill: Capillary refill takes less than 2 seconds.      Findings: No rash.   Neurological:      General: No focal deficit present.      Mental Status: She is alert.      Cranial Nerves: No cranial nerve deficit.         Assessment & Plan     Diagnoses and all orders for this visit:    1. Dysfunctional uterine bleeding (Primary)  -     TSH Rfx On Abnormal To Free T4  -     HCG, B-subunit, Quantitative  -     Follicle Stimulating Hormone  -     Luteinizing Hormone    2. Intractable migraine with aura with status migrainosus  -     ketorolac (TORADOL) 10 MG tablet; Take 1 tablet by mouth Every 6 (Six) Hours As Needed (Migraine headache) for up to 15 doses.  Dispense: 15 tablet; Refill: 0    3. Iron deficiency anemia, unspecified iron deficiency anemia type  -     CBC & Differential  -     Ferritin  -     Iron Profile    4. RLS (restless legs syndrome)    5. Obesity (BMI 30.0-34.9)    Change in menstrual cycle - new onset.  Check additional labs.  Menstrual changes may be related to topiramate use.  She will consider pelvic u/s and/or gyn referral if further work-up required.  GYN exam declined today.    Continue topiramate at current dosage.  Consider continued titration to achieve better control of migraine headaches and promote weight reduction.  Continue ropinirole for RLS (? Secondary to anemia and/or SSRI use - SSRI beneficial for management of anxiety).    Return if symptoms worsen or fail to improve.       Patient was given instructions and counseling regarding his/her condition or for health maintenance advice. Please see specific information pulled into the AVS if appropriate.     I wore protective equipment throughout this patient encounter to include mask. Hand hygiene was performed before donning protective equipment and after removal when leaving the room.

## 2023-03-01 LAB
FSH SERPL-ACNC: 7.31 MIU/ML
LH SERPL-ACNC: 9.68 MIU/ML
TSH SERPL DL<=0.05 MIU/L-ACNC: 1.56 UIU/ML (ref 0.27–4.2)

## 2023-03-17 DIAGNOSIS — G43.111 INTRACTABLE MIGRAINE WITH AURA WITH STATUS MIGRAINOSUS: ICD-10-CM

## 2023-03-17 RX ORDER — SUMATRIPTAN 50 MG/1
TABLET, FILM COATED ORAL
Qty: 9 TABLET | Refills: 3 | Status: SHIPPED | OUTPATIENT
Start: 2023-03-17

## 2023-04-08 DIAGNOSIS — G43.111 INTRACTABLE MIGRAINE WITH AURA WITH STATUS MIGRAINOSUS: ICD-10-CM

## 2023-04-10 RX ORDER — SUMATRIPTAN 50 MG/1
TABLET, FILM COATED ORAL
Qty: 9 TABLET | Refills: 3 | Status: SHIPPED | OUTPATIENT
Start: 2023-04-10

## 2023-04-10 RX ORDER — KETOROLAC TROMETHAMINE 10 MG/1
10 TABLET, FILM COATED ORAL EVERY 6 HOURS PRN
Qty: 15 TABLET | Refills: 0 | Status: SHIPPED | OUTPATIENT
Start: 2023-04-10

## 2023-04-20 ENCOUNTER — OFFICE (OUTPATIENT)
Dept: URBAN - METROPOLITAN AREA CLINIC 64 | Facility: CLINIC | Age: 28
End: 2023-04-20
Payer: MEDICAID

## 2023-04-20 VITALS
WEIGHT: 145 LBS | HEIGHT: 57 IN | HEART RATE: 72 BPM | SYSTOLIC BLOOD PRESSURE: 138 MMHG | DIASTOLIC BLOOD PRESSURE: 93 MMHG

## 2023-04-20 DIAGNOSIS — K21.9 GASTRO-ESOPHAGEAL REFLUX DISEASE WITHOUT ESOPHAGITIS: ICD-10-CM

## 2023-04-20 DIAGNOSIS — K59.00 CONSTIPATION, UNSPECIFIED: ICD-10-CM

## 2023-04-20 DIAGNOSIS — R14.0 ABDOMINAL DISTENSION (GASEOUS): ICD-10-CM

## 2023-04-20 DIAGNOSIS — R14.2 ERUCTATION: ICD-10-CM

## 2023-04-20 DIAGNOSIS — Z87.891 PERSONAL HISTORY OF NICOTINE DEPENDENCE: ICD-10-CM

## 2023-04-20 PROCEDURE — 99204 OFFICE O/P NEW MOD 45 MIN: CPT | Performed by: INTERNAL MEDICINE

## 2023-04-20 RX ORDER — SORBITOL SOLUTION 70 %
SOLUTION, ORAL MISCELLANEOUS
Qty: 100 | Refills: 0 | Status: COMPLETED
Start: 2023-04-20 | End: 2023-06-07

## 2023-04-20 RX ORDER — LINACLOTIDE 290 UG/1
290 CAPSULE, GELATIN COATED ORAL
Qty: 90 | Refills: 3 | Status: COMPLETED
Start: 2023-04-20 | End: 2023-04-25

## 2023-04-20 RX ORDER — HYDROCORTISONE 25 MG/G
5 CREAM TOPICAL
Qty: 1 | Refills: 1 | Status: COMPLETED
Start: 2023-04-20 | End: 2023-06-07

## 2023-04-20 RX ORDER — ERGOCALCIFEROL 1.25 MG/1
50000 CAPSULE ORAL
Qty: 8 CAPSULE | Refills: 0 | Status: SHIPPED | OUTPATIENT
Start: 2023-04-20

## 2023-04-20 RX ORDER — LEVOMEFOLATE/ALGAL OIL 15-90.314
1 CAPSULE ORAL DAILY
Qty: 30 CAPSULE | Refills: 0 | Status: SHIPPED | OUTPATIENT
Start: 2023-04-20

## 2023-04-20 NOTE — TELEPHONE ENCOUNTER
Caller: Eugenia Le    Relationship: Self    Best call back number: [phone]    Requested Prescriptions:   Requested Prescriptions     Pending Prescriptions Disp Refills   • vitamin D (ERGOCALCIFEROL) 1.25 MG (60257 UT) capsule capsule 8 capsule 0     Sig: Take 1 capsule by mouth Every 7 (Seven) Days.   • L-Methylfolate-Algae (L-Methylfolate Forte) 15-90.314 MG capsule capsule 30 capsule      Sig: Daily.        Pharmacy where request should be sent: [address] [phone] [facility] [phone]     Last office visit with prescribing clinician: Visit date not found   Last telemedicine visit with prescribing clinician: 4/28/2023   Next office visit with prescribing clinician: Visit date not found     Additional details provided by patient: PATIENT HAS 2 DAYS LEFT    Does the patient have less than a 3 day supply:  [x] Yes  [] No    Julianne Aiken Rep   04/20/23 10:34 EDT

## 2023-04-24 ENCOUNTER — TELEPHONE (OUTPATIENT)
Dept: FAMILY MEDICINE CLINIC | Facility: CLINIC | Age: 28
End: 2023-04-24
Payer: MEDICAID

## 2023-04-24 RX ORDER — LEVOMEFOLATE/ALGAL OIL 15-90.314
1 CAPSULE ORAL DAILY
Qty: 30 CAPSULE | Refills: 0 | Status: CANCELLED | OUTPATIENT
Start: 2023-04-24

## 2023-04-24 NOTE — TELEPHONE ENCOUNTER
PATIENT CALLING BACK, STATES [facility] HAS IT BUT THEY ARE STILL WAITING ON PRIOR AUTHORIZATION FROM US. PLEASE COMPLETE ASAP AS PATIENT TOOK LAST ONE THIS MORNING.      PATIENT> [phone]

## 2023-04-24 NOTE — TELEPHONE ENCOUNTER
PATIENT CALLED AND STATED THAT SHE CALLED [facility] AND THEY NEVER RECEIVED MED PLEASE ADVISE AND THANK YOU..    Rx Refill Note  Requested Prescriptions     Pending Prescriptions Disp Refills   • L-Methylfolate-Algae (L-Methylfolate Forte) 15-90.314 MG capsule capsule 30 capsule 0     Sig: Take 1 capsule by mouth Daily.      Last office visit with prescribing clinician: 11/3/2022   Last telemedicine visit with prescribing clinician: 4/28/2023   Next office visit with prescribing clinician: Visit date not found                         Would you like a call back once the refill request has been completed: [] Yes [] No    If the office needs to give you a call back, can they leave a voicemail: [] Yes [] No    Julianne Escamilla Rep  04/24/23, 11:10 EDT

## 2023-04-28 ENCOUNTER — TELEPHONE (OUTPATIENT)
Dept: FAMILY MEDICINE CLINIC | Facility: CLINIC | Age: 28
End: 2023-04-28
Payer: MEDICAID

## 2023-04-28 RX ORDER — OMEPRAZOLE 40 MG/1
40 CAPSULE, DELAYED RELEASE ORAL DAILY
Qty: 90 CAPSULE | Refills: 0 | Status: SHIPPED | OUTPATIENT
Start: 2023-04-28 | End: 2023-07-27

## 2023-04-28 RX ORDER — SERTRALINE HYDROCHLORIDE 100 MG/1
100 TABLET, FILM COATED ORAL DAILY
Qty: 90 TABLET | Refills: 0 | Status: SHIPPED | OUTPATIENT
Start: 2023-04-28

## 2023-04-28 NOTE — TELEPHONE ENCOUNTER
HUB to read description below.      LVM FOR PATIENT TO CALL OFFICE AND RESCHEDULE APPOINTMENT THAT WAS ON FOR April 28TH. THANK YOU PLEASE SCHEDULE NEXT AVAILABLE,

## 2023-04-28 NOTE — TELEPHONE ENCOUNTER
Rx Refill Note  Requested Prescriptions     Pending Prescriptions Disp Refills   • omeprazole (priLOSEC) 40 MG capsule 90 capsule 0     Sig: Take 1 capsule by mouth Daily for 90 days.   • sertraline (Zoloft) 100 MG tablet 90 tablet 0     Sig: Take 1 tablet by mouth Daily.      Last office visit with prescribing clinician: 12/20/2022   Last telemedicine visit with prescribing clinician: 5/1/2023   Next office visit with prescribing clinician: 5/1/2023                         Would you like a call back once the refill request has been completed: [] Yes [] No    If the office needs to give you a call back, can they leave a voicemail: [] Yes [] No    Julianne Escamilla Rep  04/28/23, 09:42 EDT

## 2023-05-04 ENCOUNTER — TELEPHONE (OUTPATIENT)
Dept: FAMILY MEDICINE CLINIC | Facility: CLINIC | Age: 28
End: 2023-05-04

## 2023-05-04 NOTE — TELEPHONE ENCOUNTER
Caller: Eugenia Le    Relationship: Self    Best call back number: [phone]    Who are you requesting to speak with (clinical staff, provider,  specific staff member): CLINCIAL    What was the call regarding:PATIENT IS NEEDING PRIOR AUTHORIZATION FOR MEDICATION L-Methylfolate-Algae (L-Methylfolate Forte) 15-90.314 MG capsule capsule    Do you require a callback: IF NEEDED

## 2023-05-15 DIAGNOSIS — G43.111 INTRACTABLE MIGRAINE WITH AURA WITH STATUS MIGRAINOSUS: ICD-10-CM

## 2023-05-15 RX ORDER — SUMATRIPTAN 50 MG/1
TABLET, FILM COATED ORAL
Qty: 9 TABLET | Refills: 3 | Status: SHIPPED | OUTPATIENT
Start: 2023-05-15

## 2023-05-22 DIAGNOSIS — G43.109 MIGRAINE WITH AURA AND WITHOUT STATUS MIGRAINOSUS, NOT INTRACTABLE: ICD-10-CM

## 2023-05-22 DIAGNOSIS — G43.111 INTRACTABLE MIGRAINE WITH AURA WITH STATUS MIGRAINOSUS: ICD-10-CM

## 2023-05-22 RX ORDER — TOPIRAMATE 50 MG/1
TABLET, FILM COATED ORAL
Qty: 60 TABLET | Refills: 2 | Status: SHIPPED | OUTPATIENT
Start: 2023-05-22

## 2023-05-22 RX ORDER — KETOROLAC TROMETHAMINE 10 MG/1
10 TABLET, FILM COATED ORAL EVERY 6 HOURS PRN
Qty: 15 TABLET | Refills: 0 | Status: SHIPPED | OUTPATIENT
Start: 2023-05-22

## 2023-05-22 NOTE — TELEPHONE ENCOUNTER
Caller: Eugenia Le    Relationship: Self    Best call back [phone]    Requested Prescriptions:   Requested Prescriptions     Pending Prescriptions Disp Refills   • ketorolac (TORADOL) 10 MG tablet 15 tablet 0     Sig: Take 1 tablet by mouth Every 6 (Six) Hours As Needed (Migraine headache) for up to 15 doses.   • topiramate (TOPAMAX) 50 MG tablet 60 tablet 2     Sig: Take 1/2 tablet nightly for one week then 1/2 tablet twice daily for one week then 1/2 tablet in the AM and 1 tablet in the Pm for one week than 1 tablet twice daily.        Pharmacy where request should be sent: [address] [phone] [facility] [phone] FX     Last office visit with prescribing clinician: 12/20/2022   Last telemedicine visit with prescribing clinician: 5/15/2023   Next office visit with prescribing clinician: 6/2/2023     Does the patient have less than a 3 day supply:  [x] Yes  [] No    Julianne Trejo Rep   05/22/23 14:18 EDT

## 2023-05-24 ENCOUNTER — TELEPHONE (OUTPATIENT)
Dept: FAMILY MEDICINE CLINIC | Facility: CLINIC | Age: 28
End: 2023-05-24

## 2023-05-24 NOTE — TELEPHONE ENCOUNTER
Patient has never had this injection in office. She has had it twice in the past at [facility]. Do you want us to schedule the patient to come in for education purposes? Pharmacy will not fill the requested med unless she has had an injection in office before.

## 2023-05-24 NOTE — TELEPHONE ENCOUNTER
PATIENT CALLED AND STATES THE PHARMACY NEEDS TO ASK THE OFFICE QUESTIONS IN REGARDS TO MEDICATION    ketorolac (TORADOL) 10 MG tablet  BEFORE THEY GIVE HER MEDICATION. THEY WANT TO KNOW IF SHE HAS EVER HAD THE INJECTION FORM OF THIS MEDICATION IN THE OFFICE.    [facility] [address] [place] [phone]  [facility] - [phone]  - [phone]   [phone]    PLEASE CALL WHEN OFFICE HAS CALLED THE PHARMACY [phone]

## 2023-06-02 ENCOUNTER — OFFICE VISIT (OUTPATIENT)
Dept: FAMILY MEDICINE CLINIC | Facility: CLINIC | Age: 28
End: 2023-06-02

## 2023-06-02 VITALS
BODY MASS INDEX: 29.92 KG/M2 | HEIGHT: 59 IN | HEART RATE: 93 BPM | WEIGHT: 148.4 LBS | OXYGEN SATURATION: 100 % | DIASTOLIC BLOOD PRESSURE: 78 MMHG | SYSTOLIC BLOOD PRESSURE: 111 MMHG | TEMPERATURE: 97.4 F

## 2023-06-02 DIAGNOSIS — G43.809 OTHER MIGRAINE WITHOUT STATUS MIGRAINOSUS, NOT INTRACTABLE: ICD-10-CM

## 2023-06-02 DIAGNOSIS — R22.0 MASS OF SCALP: Primary | ICD-10-CM

## 2023-06-02 DIAGNOSIS — Z76.89 ENCOUNTER TO ESTABLISH CARE: ICD-10-CM

## 2023-06-02 NOTE — PROGRESS NOTES
"Chief Complaint  Cyst (Believes to be \"under the skin\" and states it is the starting point of her migraines.) and Medication Problem ([facility] states they will not refill her Toradol without her receiving an injection in office first. Patient states she received this medication through an IV once before in an ER back in December. Patient also states they have refilled this for her in the past with no issues.)    Subjective    History of Present Illness {CC  Problem List  Visit  Diagnosis   Encounters  Notes  Medications  Labs  Result Review Imaging  Media :23}     Eugenia Le presents to [facility] for Cyst (Believes to be \"under the skin\" and states it is the starting point of her migraines.) and Medication Problem ([facility] states they will not refill her Toradol without her receiving an injection in office first. Patient states she received this medication through an IV once before in an ER back in December. Patient also states they have refilled this for her in the past with no issues.).    History of Present Illness  Patient is a 27-year-old female who presents to the clinic today for 3-month follow-up of migraines with concerns of a small cyst on top of scalp that has been there greater than 6 months.  Patient denies any chest pain, shortness of breath, or any fevers.  Patient denies any known exposure to COVID, flu, or any other contagious illnesses.    In regards to migraines, patient is currently taking triptans to manage this.  She shares that she is still having an intermittent migraine breakthroughs of more than 4/month.  We discussed options and patient would like to try sample of Nurtec or Ubrelvy today.  We also further discussed Emgality as a once monthly injection to prevent migraines.  We can discuss this further in the future if patient would like.    In regards to the cyst, patient is requesting a referral to have this removed.  I have put in a referral to " "general surgery to have this done.  Patient shares that it is painful and has become bothersome.       Review of Systems   Constitutional: Negative.  Negative for activity change, chills, fatigue and fever.   HENT: Negative.  Negative for congestion, dental problem, ear pain, hearing loss, rhinorrhea, sinus pain, sore throat, tinnitus and trouble swallowing.    Eyes: Negative.  Negative for pain and visual disturbance.   Respiratory: Negative.  Negative for cough, chest tightness, shortness of breath and wheezing.    Cardiovascular: Negative.  Negative for chest pain, palpitations and leg swelling.   Gastrointestinal: Negative.  Negative for abdominal pain, diarrhea, nausea and vomiting.   Endocrine: Negative.  Negative for polydipsia, polyphagia and polyuria.   Genitourinary: Negative.  Negative for difficulty urinating, dysuria, frequency and urgency.   Musculoskeletal: Negative.  Negative for arthralgias, back pain and myalgias.   Skin: Negative.  Negative for color change, pallor, rash and wound.        Small mass on top of scalp.   Allergic/Immunologic: Negative.  Negative for environmental allergies.   Neurological: Negative.  Negative for dizziness, speech difficulty, weakness, light-headedness, numbness and headaches.   Hematological: Negative.    Psychiatric/Behavioral: Negative.  Negative for confusion, decreased concentration, self-injury and suicidal ideas. The patient is not nervous/anxious.    All other systems reviewed and are negative.       Objective     Vital Signs:   /78 (BP Location: Left arm, Patient Position: Sitting, Cuff Size: Large Adult)   Pulse 93   Temp 97.4 °F (36.3 °C) (Oral)   Ht 149.9 cm (59\")   Wt 67.3 kg (148 lb 6.4 oz)   SpO2 100%   BMI 29.97 kg/m²   Current Outpatient Medications on File Prior to Visit   Medication Sig Dispense Refill   • busPIRone (BUSPAR) 5 MG tablet Take 1 tablet by mouth Every Night. May also take 1 tablet 2 (Two) Times a Day As Needed (anxiety). " 90 tablet 1   • ferrous gluconate 324 (37.5 Fe) MG tablet tablet Take 1 tablet by mouth Daily With Breakfast. 30 tablet 5   • L-Methylfolate-Algae (L-Methylfolate Forte) 15-90.314 MG capsule capsule Take 1 capsule by mouth Daily. 30 capsule 0   • omeprazole (priLOSEC) 40 MG capsule Take 1 capsule by mouth Daily for 90 days. 90 capsule 0   • ondansetron ODT (ZOFRAN-ODT) 4 MG disintegrating tablet Place 1 tablet on the tongue Every 6 (Six) Hours As Needed for Nausea or Vomiting for up to 15 doses. 15 tablet 0   • rOPINIRole (REQUIP) 0.5 MG tablet Take 1 tablet by mouth Every Night. Take 1 hour before bedtime. 30 tablet 2   • sertraline (Zoloft) 100 MG tablet Take 1 tablet by mouth Daily. 90 tablet 0   • SUMAtriptan (Imitrex) 50 MG tablet Take one tablet at onset of headache. May repeat dose one time in 2 hours if headache not relieved. 9 tablet 3   • topiramate (TOPAMAX) 50 MG tablet Take 1/2 tablet nightly for one week then 1/2 tablet twice daily for one week then 1/2 tablet in the AM and 1 tablet in the Pm for one week than 1 tablet twice daily. 60 tablet 2   • vitamin D (ERGOCALCIFEROL) 1.25 MG (31233 UT) capsule capsule Take 1 capsule by mouth Every 7 (Seven) Days. 8 capsule 0   • ketorolac (TORADOL) 10 MG tablet Take 1 tablet by mouth Every 6 (Six) Hours As Needed (Migraine headache) for up to 15 doses. (Patient not taking: Reported on 2023) 15 tablet 0     No current facility-administered medications on file prior to visit.        Past Medical History:   Diagnosis Date   • Anxiety    • Hidradenitis suppurativa 2023   • Migraines       Past Surgical History:   Procedure Laterality Date   •  SECTION  2018   • TEETH EXTRACTION  2019    All top teeth removed- wears dentures   • TUBAL ABDOMINAL LIGATION  2018      Family History   Problem Relation Age of Onset   • Anxiety disorder Mother    • Heart disease Father    • Hypertension Father    • Diverticulitis Father    • Autism Sister    • No Known  Problems Daughter    • No Known Problems Son    • Diabetes Paternal Uncle       Social History     Socioeconomic History   • Marital status:    Tobacco Use   • Smoking status: Former     Packs/day: 0.25     Years: 1.00     Pack years: 0.25     Types: Cigarettes     Start date: 2014     Quit date: 2014     Years since quittin.5     Passive exposure: Past   • Smokeless tobacco: Never   Vaping Use   • Vaping Use: Never used   Substance and Sexual Activity   • Alcohol use: Not Currently   • Drug use: Never   • Sexual activity: Yes     Partners: Male     Birth control/protection: Tubal ligation         No visits with results within 3 Month(s) from this visit.   Latest known visit with results is:   Office Visit on 2023   Component Date Value Ref Range Status   • TSH 2023 1.560  0.270 - 4.200 uIU/mL Final   • FSH 2023 7.31  mIU/mL Final   • LH 2023 9.68  mIU/mL Final   • Ferritin 2023 44.55  13.00 - 150.00 ng/mL Final   • Iron 2023 66  37 - 145 mcg/dL Final   • Iron Saturation 2023 14 (L)  20 - 50 % Final   • Transferrin 2023 312  200 - 360 mg/dL Final   • TIBC 2023 465  298 - 536 mcg/dL Final   • WBC 2023 7.60  3.40 - 10.80 10*3/mm3 Final   • RBC 2023 4.74  3.77 - 5.28 10*6/mm3 Final   • Hemoglobin 2023 14.2  12.0 - 15.9 g/dL Final   • Hematocrit 2023 43.0  34.0 - 46.6 % Final   • MCV 2023 90.6  79.0 - 97.0 fL Final   • MCH 2023 30.0  26.6 - 33.0 pg Final   • MCHC 2023 33.1  31.5 - 35.7 g/dL Final   • RDW 2023 14.1  12.3 - 15.4 % Final   • RDW-SD 2023 47.7  37.0 - 54.0 fl Final   • MPV 2023 8.8  6.0 - 12.0 fL Final   • Platelets 2023 309  140 - 450 10*3/mm3 Final   • Scan Slide 2023    Final    See Manual Differential Results   • HCG Quantitative 2023 <1.00  mIU/mL Final   • Neutrophil % 2023 48.0  42.7 - 76.0 % Final   • Lymphocyte % 2023 35.0  19.6 - 45.3 %  Final   • Monocyte % 02/28/2023 10.0  5.0 - 12.0 % Final   • Eosinophil % 02/28/2023 5.0  0.3 - 6.2 % Final   • Basophil % 02/28/2023 1.0  0.0 - 1.5 % Final   • Bands %  02/28/2023 1.0  0.0 - 5.0 % Final   • Neutrophils Absolute 02/28/2023 3.72  1.70 - 7.00 10*3/mm3 Final   • Lymphocytes Absolute 02/28/2023 2.66  0.70 - 3.10 10*3/mm3 Final   • Monocytes Absolute 02/28/2023 0.76  0.10 - 0.90 10*3/mm3 Final   • Eosinophils Absolute 02/28/2023 0.38  0.00 - 0.40 10*3/mm3 Final   • Basophils Absolute 02/28/2023 0.08  0.00 - 0.20 10*3/mm3 Final   • RBC Morphology 02/28/2023 Normal  Normal Final   • WBC Morphology 02/28/2023 Normal  Normal Final   • Platelet Morphology 02/28/2023 Normal  Normal Final         Physical Exam  Vitals and nursing note reviewed.   Constitutional:       Appearance: Normal appearance. She is normal weight.   HENT:      Head: Normocephalic and atraumatic. Mass present.     Cardiovascular:      Rate and Rhythm: Normal rate and regular rhythm.      Pulses: Normal pulses.      Heart sounds: Normal heart sounds. No murmur heard.    No friction rub. No gallop.   Pulmonary:      Effort: Pulmonary effort is normal. No respiratory distress.      Breath sounds: Normal breath sounds. No stridor. No wheezing, rhonchi or rales.   Chest:      Chest wall: No tenderness.   Abdominal:      General: Abdomen is flat. Bowel sounds are normal. There is no distension.      Palpations: Abdomen is soft. There is no mass.      Tenderness: There is no abdominal tenderness. There is no right CVA tenderness, left CVA tenderness, guarding or rebound.      Hernia: No hernia is present.   Skin:     General: Skin is warm and dry.      Capillary Refill: Capillary refill takes less than 2 seconds.      Coloration: Skin is not jaundiced or pale.   Neurological:      General: No focal deficit present.      Mental Status: She is alert and oriented to person, place, and time. Mental status is at baseline.      Motor: No weakness.       Coordination: Coordination normal.      Gait: Gait normal.   Psychiatric:         Mood and Affect: Mood normal.         Behavior: Behavior normal.         Thought Content: Thought content normal.         Judgment: Judgment normal.          Result Review  Data Reviewed:{ Labs  Result Review  Imaging  Med Tab  Media :23}   I have reviewed this patient's chart.  I have reviewed previous labs, previous imaging, previous medications, and previous encounters with notes that were available in this patient's chart.             Assessment and Plan {CC Problem List  Visit Diagnosis  ROS  Review (Popup)  [facility]  Quality  BestPractice  Medications  SmartSets  SnapShot Encounters  Media :23}   Diagnoses and all orders for this visit:    1. Mass of scalp (Primary)  -     Ambulatory Referral to General Surgery    2. Other migraine without status migrainosus, not intractable  -     Rimegepant Sulfate (NURTEC) 75 MG tablet dispersible tablet; Take 1 tablet by mouth Daily As Needed (migraines).  Dispense: 2 tablet; Refill: 0  -     ubrogepant (Ubrelvy) 100 MG tablet; Take 1 tablet by mouth Daily As Needed (migraines).  Dispense: 2 tablet; Refill: 0    3. Encounter to establish care      -Samples of Nurtec and Ubrelvy given to patient to trial with next migraine.  Also further discussed Emgality as a potential option in the future for migraine treatment.  -ER red flags discussed with patient.  -Referral to general surgery to have small mass on top of scalp removed.  -Follow-up in 3 months or sooner if needed.    I spent 40 minutes caring for Eugenia on this date of service. This time includes time spent by me in the following activities:preparing for the visit, reviewing tests, obtaining and/or reviewing a separately obtained history, performing a medically appropriate examination and/or evaluation , counseling and educating the patient/family/caregiver, ordering medications, tests, or procedures, referring  and communicating with other health care professionals , documenting information in the medical record, independently interpreting results and communicating that information with the patient/family/caregiver and care coordination.    Follow Up {Instructions Charge Capture  Follow-up Communications :23}     Patient was given instructions and counseling regarding her condition or for health maintenance advice. Please see specific information pulled into the AVS (placed there by myself) if appropriate.    Return in about 3 months (around 9/2/2023) for Next scheduled follow up migraines.    MDM  Number of Diagnoses or Management Options  Mass of scalp: established, worsening  Other migraine without status migrainosus, not intractable: established, worsening     Amount and/or Complexity of Data Reviewed  Clinical lab tests: ordered and reviewed  Tests in the radiology section of CPT®: reviewed  Tests in the medicine section of CPT®: reviewed  Discussion of test results with the performing providers: no  Decide to obtain previous medical records or to obtain history from someone other than the patient: no  Obtain history from someone other than the patient: no  Review and summarize past medical records: yes  Discuss the patient with other providers: no  Independent visualization of images, tracings, or specimens: no    Risk of Complications, Morbidity, and/or Mortality  Presenting problems: moderate  Diagnostic procedures: low  Management options: moderate    Patient Progress  Patient progress: stable       BMI is >= 25 and <30. (Overweight) The following options were offered after discussion;: exercise counseling/recommendations and nutrition counseling/recommendations       SARITA Alex, FNP-BC

## 2023-06-08 ENCOUNTER — OFFICE (OUTPATIENT)
Dept: URBAN - METROPOLITAN AREA PATHOLOGY 4 | Facility: PATHOLOGY | Age: 28
End: 2023-06-08
Payer: COMMERCIAL

## 2023-06-08 ENCOUNTER — OFFICE (OUTPATIENT)
Dept: URBAN - METROPOLITAN AREA PATHOLOGY 4 | Facility: PATHOLOGY | Age: 28
End: 2023-06-08
Payer: MEDICAID

## 2023-06-08 ENCOUNTER — ON CAMPUS - OUTPATIENT (OUTPATIENT)
Dept: URBAN - METROPOLITAN AREA HOSPITAL 2 | Facility: HOSPITAL | Age: 28
End: 2023-06-08
Payer: MEDICAID

## 2023-06-08 VITALS
TEMPERATURE: 98.2 F | RESPIRATION RATE: 16 BRPM | SYSTOLIC BLOOD PRESSURE: 119 MMHG | DIASTOLIC BLOOD PRESSURE: 65 MMHG | DIASTOLIC BLOOD PRESSURE: 82 MMHG | SYSTOLIC BLOOD PRESSURE: 135 MMHG | DIASTOLIC BLOOD PRESSURE: 71 MMHG | SYSTOLIC BLOOD PRESSURE: 117 MMHG | OXYGEN SATURATION: 98 % | SYSTOLIC BLOOD PRESSURE: 118 MMHG | RESPIRATION RATE: 17 BRPM | WEIGHT: 148 LBS | HEIGHT: 57 IN | RESPIRATION RATE: 15 BRPM | DIASTOLIC BLOOD PRESSURE: 88 MMHG | DIASTOLIC BLOOD PRESSURE: 84 MMHG | HEART RATE: 110 BPM | HEART RATE: 103 BPM | HEART RATE: 73 BPM | HEART RATE: 118 BPM | HEART RATE: 102 BPM | OXYGEN SATURATION: 99 % | SYSTOLIC BLOOD PRESSURE: 125 MMHG

## 2023-06-08 DIAGNOSIS — K20.0 EOSINOPHILIC ESOPHAGITIS: ICD-10-CM

## 2023-06-08 DIAGNOSIS — K31.89 OTHER DISEASES OF STOMACH AND DUODENUM: ICD-10-CM

## 2023-06-08 DIAGNOSIS — K44.9 DIAPHRAGMATIC HERNIA WITHOUT OBSTRUCTION OR GANGRENE: ICD-10-CM

## 2023-06-08 DIAGNOSIS — K21.9 GASTRO-ESOPHAGEAL REFLUX DISEASE WITHOUT ESOPHAGITIS: ICD-10-CM

## 2023-06-08 PROBLEM — K22.89 OTHER SPECIFIED DISEASE OF ESOPHAGUS: Status: ACTIVE | Noted: 2023-06-08

## 2023-06-08 PROBLEM — K20.80 OTHER ESOPHAGITIS WITHOUT BLEEDING: Status: ACTIVE | Noted: 2023-06-08

## 2023-06-08 PROBLEM — K20.90 ESOPHAGITIS, UNSPECIFIED WITHOUT BLEEDING: Status: ACTIVE | Noted: 2023-06-08

## 2023-06-08 LAB
GI HISTOLOGY: A. SELECT: (no result)
GI HISTOLOGY: B. UNSPECIFIED: (no result)
GI HISTOLOGY: PDF REPORT: (no result)

## 2023-06-08 PROCEDURE — 88305 TISSUE EXAM BY PATHOLOGIST: CPT | Mod: 26 | Performed by: INTERNAL MEDICINE

## 2023-06-08 PROCEDURE — 43239 EGD BIOPSY SINGLE/MULTIPLE: CPT | Performed by: INTERNAL MEDICINE

## 2023-06-08 RX ORDER — OMEPRAZOLE 40 MG/1
40 CAPSULE, DELAYED RELEASE ORAL
Qty: 90 | Refills: 3 | Status: ACTIVE
Start: 2023-06-08

## 2023-06-09 RX ORDER — ERGOCALCIFEROL 1.25 MG/1
50000 CAPSULE ORAL WEEKLY
Qty: 8 CAPSULE | Refills: 0 | Status: SHIPPED | OUTPATIENT
Start: 2023-06-09

## 2023-06-13 ENCOUNTER — TELEPHONE (OUTPATIENT)
Dept: ONCOLOGY | Facility: CLINIC | Age: 28
End: 2023-06-13
Payer: MEDICAID

## 2023-06-14 ENCOUNTER — OFFICE VISIT (OUTPATIENT)
Dept: SURGERY | Facility: CLINIC | Age: 28
End: 2023-06-14
Payer: MEDICAID

## 2023-06-14 VITALS
SYSTOLIC BLOOD PRESSURE: 119 MMHG | TEMPERATURE: 97.7 F | DIASTOLIC BLOOD PRESSURE: 80 MMHG | WEIGHT: 150 LBS | HEIGHT: 59 IN | HEART RATE: 95 BPM | OXYGEN SATURATION: 98 % | BODY MASS INDEX: 30.24 KG/M2

## 2023-06-14 DIAGNOSIS — L98.9 LESION OF SKIN OF SCALP: Primary | ICD-10-CM

## 2023-06-14 PROCEDURE — 1159F MED LIST DOCD IN RCRD: CPT | Performed by: SURGERY

## 2023-06-14 PROCEDURE — 1160F RVW MEDS BY RX/DR IN RCRD: CPT | Performed by: SURGERY

## 2023-06-14 PROCEDURE — 99203 OFFICE O/P NEW LOW 30 MIN: CPT | Performed by: SURGERY

## 2023-06-14 NOTE — PROGRESS NOTES
CHIEF COMPLAINT:    Scalp lesion    HISTORY OF PRESENT ILLNESS:    Eugenia Le is a 27 y.o. female who is referred today to discuss removal of a lesion from the top of her scalp.  She states that her  previously noted it.  She is unsure of how long its been there.  She states that it has gotten slightly larger since it was first noted.  She notes intermittent discomfort at the area.  She also believes it may be contributing to her migraine headaches.    Past Medical History:   Diagnosis Date   • Anxiety    • Hidradenitis suppurativa 2023   • Migraines        Past Surgical History:   Procedure Laterality Date   •  SECTION     • TEETH EXTRACTION      All top teeth removed- wears dentures   • TUBAL ABDOMINAL LIGATION  2018       Prior to Admission medications    Medication Sig Start Date End Date Taking? Authorizing Provider   busPIRone (BUSPAR) 5 MG tablet Take 1 tablet by mouth Every Night. May also take 1 tablet 2 (Two) Times a Day As Needed (anxiety). 22  Yes Darius Rodarte APRN   ferrous gluconate 324 (37.5 Fe) MG tablet tablet Take 1 tablet by mouth Daily With Breakfast. 23  Yes Roddy Simeon MD   ketorolac (TORADOL) 10 MG tablet Take 1 tablet by mouth Every 6 (Six) Hours As Needed (Migraine headache) for up to 15 doses. 23  Yes Darius Rodarte APRN   L-Methylfolate-Algae (L-Methylfolate Forte) 15-90.314 MG capsule capsule Take 1 capsule by mouth Daily. 23  Yes Darius Rodarte APRN   omeprazole (priLOSEC) 40 MG capsule Take 1 capsule by mouth Daily for 90 days. 23 Yes Darius Rodarte APRN   ondansetron ODT (ZOFRAN-ODT) 4 MG disintegrating tablet Place 1 tablet on the tongue Every 6 (Six) Hours As Needed for Nausea or Vomiting for up to 15 doses. 23  Yes Bill Lutz MD   Rimegepant Sulfate (NURTEC) 75 MG tablet dispersible tablet Take 1 tablet by mouth Daily As Needed (migraines). 23  Yes Darius Rodarte  "SARITA KOCH   rOPINIRole (REQUIP) 0.5 MG tablet Take 1 tablet by mouth Every Night. Take 1 hour before bedtime. 23  Yes Tiffani Brown MD   sertraline (Zoloft) 100 MG tablet Take 1 tablet by mouth Daily. 23  Yes Darius Rodarte APRN   SUMAtriptan (Imitrex) 50 MG tablet Take one tablet at onset of headache. May repeat dose one time in 2 hours if headache not relieved. 5/15/23  Yes Darius Rodarte APRN   topiramate (TOPAMAX) 50 MG tablet Take 1/2 tablet nightly for one week then 1/2 tablet twice daily for one week then 1/2 tablet in the AM and 1 tablet in the Pm for one week than 1 tablet twice daily. 23  Yes Darius Rodarte APRN   ubrogepant (Ubrelvy) 100 MG tablet Take 1 tablet by mouth Daily As Needed (migraines). 23  Yes Darius Rodarte APRN   vitamin D (ERGOCALCIFEROL) 1.25 MG (83925 UT) capsule capsule Take 1 capsule by mouth once a week 23  Yes Darius Rodarte APRN       No Known Allergies    Family History   Problem Relation Age of Onset   • Anxiety disorder Mother    • Heart disease Father    • Hypertension Father    • Diverticulitis Father    • Autism Sister    • No Known Problems Daughter    • No Known Problems Son    • Diabetes Paternal Uncle        Social History     Socioeconomic History   • Marital status:    Tobacco Use   • Smoking status: Former     Packs/day: 0.25     Years: 1.00     Pack years: 0.25     Types: Cigarettes     Start date: 2014     Quit date: 2014     Years since quittin.5     Passive exposure: Past   • Smokeless tobacco: Never   Vaping Use   • Vaping Use: Never used   Substance and Sexual Activity   • Alcohol use: Not Currently   • Drug use: Never   • Sexual activity: Yes     Partners: Male     Birth control/protection: Tubal ligation       Review of Systems   Neurological: Positive for headaches.       Objective     /80   Pulse 95   Temp 97.7 °F (36.5 °C) (Infrared)   Ht 149.9 cm (59\")   " Wt 68 kg (150 lb)   SpO2 98%   BMI 30.30 kg/m²     Physical Exam  Constitutional:       General: She is not in acute distress.     Appearance: Normal appearance. She is not ill-appearing, toxic-appearing or diaphoretic.   HENT:      Head: Normocephalic and atraumatic.     Eyes:      General: No scleral icterus.        Right eye: No discharge.         Left eye: No discharge.      Extraocular Movements: Extraocular movements intact.      Conjunctiva/sclera: Conjunctivae normal.   Pulmonary:      Effort: Pulmonary effort is normal. No respiratory distress.   Skin:     General: Skin is warm.      Coloration: Skin is not jaundiced.   Neurological:      General: No focal deficit present.      Mental Status: She is alert and oriented to person, place, and time.   Psychiatric:         Mood and Affect: Mood normal.         Behavior: Behavior normal.         Thought Content: Thought content normal.         Judgment: Judgment normal.           ASSESSMENT:    Skin lesion or cyst of scalp    PLAN:    On exam the patient appears to have a nonpigmented skin lesion versus a small cyst at the vertex of her scalp.  It is somewhat sore to touch.  I do not believe that is the source of her headaches.  On the one hand she does think it is a source of her headaches, but on the other hand she also states that she believes her migraines are hereditary.  In any case, the patient would like the lesion removed as it does cause soreness and has slightly enlarged.  Given the location of the lesion I recommended that this be performed in the operating room.  She is agreeable to this.  The risks and benefits of excision of scalp lesion have been discussed.  She is scheduled for surgery.          This document has been electronically signed by Solis Paris MD on June 14, 2023 14:25 EDT

## 2023-06-16 DIAGNOSIS — G43.111 INTRACTABLE MIGRAINE WITH AURA WITH STATUS MIGRAINOSUS: ICD-10-CM

## 2023-06-16 RX ORDER — SUMATRIPTAN 50 MG/1
TABLET, FILM COATED ORAL
Qty: 9 TABLET | Refills: 3 | Status: SHIPPED | OUTPATIENT
Start: 2023-06-16

## 2023-06-16 NOTE — TELEPHONE ENCOUNTER
Rx Refill Note  Requested Prescriptions     Pending Prescriptions Disp Refills   • SUMAtriptan (Imitrex) 50 MG tablet 9 tablet 3     Sig: Take one tablet at onset of headache. May repeat dose one time in 2 hours if headache not relieved.      Last office visit with prescribing clinician: 6/2/2023   Last telemedicine visit with prescribing clinician: Visit date not found   Next office visit with prescribing clinician: 8/11/2023                         Would you like a call back once the refill request has been completed: [] Yes [] No    If the office needs to give you a call back, can they leave a voicemail: [] Yes [] No    Mikki Soler MA  06/16/23, 13:35 EDT

## 2023-07-31 ENCOUNTER — LAB (OUTPATIENT)
Dept: LAB | Facility: HOSPITAL | Age: 28
End: 2023-07-31
Payer: MEDICAID

## 2023-07-31 LAB
DEPRECATED RDW RBC AUTO: 37.8 FL (ref 37–54)
ERYTHROCYTE [DISTWIDTH] IN BLOOD BY AUTOMATED COUNT: 11.7 % (ref 12.3–15.4)
HCT VFR BLD AUTO: 38.7 % (ref 34–46.6)
HGB BLD-MCNC: 12.7 G/DL (ref 12–15.9)
MCH RBC QN AUTO: 29.5 PG (ref 26.6–33)
MCHC RBC AUTO-ENTMCNC: 32.8 G/DL (ref 31.5–35.7)
MCV RBC AUTO: 89.8 FL (ref 79–97)
PLATELET # BLD AUTO: 320 10*3/MM3 (ref 140–450)
PMV BLD AUTO: 10.5 FL (ref 6–12)
RBC # BLD AUTO: 4.31 10*6/MM3 (ref 3.77–5.28)
WBC NRBC COR # BLD: 9.28 10*3/MM3 (ref 3.4–10.8)

## 2023-07-31 PROCEDURE — 85027 COMPLETE CBC AUTOMATED: CPT

## 2023-08-01 ENCOUNTER — ANESTHESIA EVENT (OUTPATIENT)
Dept: PERIOP | Facility: HOSPITAL | Age: 28
End: 2023-08-01

## 2023-08-01 ENCOUNTER — ANESTHESIA (OUTPATIENT)
Dept: PERIOP | Facility: HOSPITAL | Age: 28
End: 2023-08-01

## 2023-08-01 ENCOUNTER — HOSPITAL ENCOUNTER (OUTPATIENT)
Facility: HOSPITAL | Age: 28
Setting detail: HOSPITAL OUTPATIENT SURGERY
Discharge: HOME | End: 2023-08-01
Attending: SURGERY | Admitting: SURGERY
Payer: MEDICAID

## 2023-08-01 VITALS
HEART RATE: 92 BPM | SYSTOLIC BLOOD PRESSURE: 105 MMHG | HEIGHT: 57 IN | RESPIRATION RATE: 20 BRPM | BODY MASS INDEX: 33.27 KG/M2 | WEIGHT: 154.2 LBS | DIASTOLIC BLOOD PRESSURE: 68 MMHG | OXYGEN SATURATION: 95 % | TEMPERATURE: 98.6 F

## 2023-08-01 DIAGNOSIS — L98.9 LESION OF SKIN OF SCALP: ICD-10-CM

## 2023-08-01 LAB — B-HCG UR QL: NEGATIVE

## 2023-08-01 PROCEDURE — 25010000002 PROPOFOL 200 MG/20ML EMULSION: Performed by: ANESTHESIOLOGIST ASSISTANT

## 2023-08-01 PROCEDURE — 25010000002 ONDANSETRON PER 1 MG: Performed by: ANESTHESIOLOGIST ASSISTANT

## 2023-08-01 PROCEDURE — 25010000002 MIDAZOLAM PER 1 MG: Performed by: ANESTHESIOLOGIST ASSISTANT

## 2023-08-01 PROCEDURE — 81025 URINE PREGNANCY TEST: CPT | Performed by: SURGERY

## 2023-08-01 PROCEDURE — 25010000002 FENTANYL CITRATE (PF) 100 MCG/2ML SOLUTION: Performed by: ANESTHESIOLOGIST ASSISTANT

## 2023-08-01 PROCEDURE — 25010000002 CEFAZOLIN PER 500 MG: Performed by: SURGERY

## 2023-08-01 PROCEDURE — 11421 EXC H-F-NK-SP B9+MARG 0.6-1: CPT | Performed by: SURGERY

## 2023-08-01 PROCEDURE — 88305 TISSUE EXAM BY PATHOLOGIST: CPT | Performed by: SURGERY

## 2023-08-01 PROCEDURE — 25010000002 DEXAMETHASONE PER 1 MG: Performed by: ANESTHESIOLOGIST ASSISTANT

## 2023-08-01 PROCEDURE — S0260 H&P FOR SURGERY: HCPCS | Performed by: SURGERY

## 2023-08-01 RX ORDER — HYDRALAZINE HYDROCHLORIDE 20 MG/ML
5 INJECTION INTRAMUSCULAR; INTRAVENOUS
Status: DISCONTINUED | OUTPATIENT
Start: 2023-08-01 | End: 2023-08-01 | Stop reason: HOSPADM

## 2023-08-01 RX ORDER — LABETALOL HYDROCHLORIDE 5 MG/ML
5 INJECTION, SOLUTION INTRAVENOUS
Status: DISCONTINUED | OUTPATIENT
Start: 2023-08-01 | End: 2023-08-01 | Stop reason: HOSPADM

## 2023-08-01 RX ORDER — LORAZEPAM 2 MG/ML
1 INJECTION INTRAMUSCULAR
Status: DISCONTINUED | OUTPATIENT
Start: 2023-08-01 | End: 2023-08-01 | Stop reason: HOSPADM

## 2023-08-01 RX ORDER — DEXAMETHASONE SODIUM PHOSPHATE 4 MG/ML
INJECTION, SOLUTION INTRA-ARTICULAR; INTRALESIONAL; INTRAMUSCULAR; INTRAVENOUS; SOFT TISSUE AS NEEDED
Status: DISCONTINUED | OUTPATIENT
Start: 2023-08-01 | End: 2023-08-01 | Stop reason: SURG

## 2023-08-01 RX ORDER — PROPOFOL 10 MG/ML
INJECTION, EMULSION INTRAVENOUS AS NEEDED
Status: DISCONTINUED | OUTPATIENT
Start: 2023-08-01 | End: 2023-08-01 | Stop reason: SURG

## 2023-08-01 RX ORDER — FENTANYL CITRATE 50 UG/ML
50 INJECTION, SOLUTION INTRAMUSCULAR; INTRAVENOUS
Status: DISCONTINUED | OUTPATIENT
Start: 2023-08-01 | End: 2023-08-01 | Stop reason: HOSPADM

## 2023-08-01 RX ORDER — DIPHENHYDRAMINE HYDROCHLORIDE 50 MG/ML
12.5 INJECTION INTRAMUSCULAR; INTRAVENOUS
Status: DISCONTINUED | OUTPATIENT
Start: 2023-08-01 | End: 2023-08-01 | Stop reason: HOSPADM

## 2023-08-01 RX ORDER — HYDROCODONE BITARTRATE AND ACETAMINOPHEN 10; 325 MG/1; MG/1
1 TABLET ORAL EVERY 4 HOURS PRN
Status: DISCONTINUED | OUTPATIENT
Start: 2023-08-01 | End: 2023-08-01 | Stop reason: HOSPADM

## 2023-08-01 RX ORDER — ONDANSETRON 2 MG/ML
4 INJECTION INTRAMUSCULAR; INTRAVENOUS ONCE AS NEEDED
Status: DISCONTINUED | OUTPATIENT
Start: 2023-08-01 | End: 2023-08-01 | Stop reason: HOSPADM

## 2023-08-01 RX ORDER — DIPHENHYDRAMINE HYDROCHLORIDE 50 MG/ML
12.5 INJECTION INTRAMUSCULAR; INTRAVENOUS ONCE AS NEEDED
Status: DISCONTINUED | OUTPATIENT
Start: 2023-08-01 | End: 2023-08-01 | Stop reason: HOSPADM

## 2023-08-01 RX ORDER — LIDOCAINE HYDROCHLORIDE 10 MG/ML
INJECTION, SOLUTION EPIDURAL; INFILTRATION; INTRACAUDAL; PERINEURAL AS NEEDED
Status: DISCONTINUED | OUTPATIENT
Start: 2023-08-01 | End: 2023-08-01 | Stop reason: SURG

## 2023-08-01 RX ORDER — LIDOCAINE HYDROCHLORIDE 10 MG/ML
0.5 INJECTION, SOLUTION INFILTRATION; PERINEURAL ONCE AS NEEDED
Status: DISCONTINUED | OUTPATIENT
Start: 2023-08-01 | End: 2023-08-01 | Stop reason: HOSPADM

## 2023-08-01 RX ORDER — FENTANYL CITRATE 50 UG/ML
INJECTION, SOLUTION INTRAMUSCULAR; INTRAVENOUS AS NEEDED
Status: DISCONTINUED | OUTPATIENT
Start: 2023-08-01 | End: 2023-08-01 | Stop reason: SURG

## 2023-08-01 RX ORDER — MEPERIDINE HYDROCHLORIDE 25 MG/ML
12.5 INJECTION INTRAMUSCULAR; INTRAVENOUS; SUBCUTANEOUS
Status: DISCONTINUED | OUTPATIENT
Start: 2023-08-01 | End: 2023-08-01 | Stop reason: HOSPADM

## 2023-08-01 RX ORDER — HYDROCODONE BITARTRATE AND ACETAMINOPHEN 5; 325 MG/1; MG/1
1 TABLET ORAL ONCE AS NEEDED
Status: DISCONTINUED | OUTPATIENT
Start: 2023-08-01 | End: 2023-08-01 | Stop reason: HOSPADM

## 2023-08-01 RX ORDER — ONDANSETRON 2 MG/ML
INJECTION INTRAMUSCULAR; INTRAVENOUS AS NEEDED
Status: DISCONTINUED | OUTPATIENT
Start: 2023-08-01 | End: 2023-08-01 | Stop reason: SURG

## 2023-08-01 RX ORDER — HYDROCODONE BITARTRATE AND ACETAMINOPHEN 5; 325 MG/1; MG/1
1 TABLET ORAL EVERY 6 HOURS PRN
Qty: 4 TABLET | Refills: 0 | Status: SHIPPED | OUTPATIENT
Start: 2023-08-01 | End: 2023-08-09

## 2023-08-01 RX ORDER — SODIUM CHLORIDE 0.9 % (FLUSH) 0.9 %
10 SYRINGE (ML) INJECTION AS NEEDED
Status: DISCONTINUED | OUTPATIENT
Start: 2023-08-01 | End: 2023-08-01 | Stop reason: HOSPADM

## 2023-08-01 RX ORDER — DIPHENHYDRAMINE HCL 25 MG
25 CAPSULE ORAL
Status: DISCONTINUED | OUTPATIENT
Start: 2023-08-01 | End: 2023-08-01 | Stop reason: HOSPADM

## 2023-08-01 RX ORDER — IPRATROPIUM BROMIDE AND ALBUTEROL SULFATE 2.5; .5 MG/3ML; MG/3ML
3 SOLUTION RESPIRATORY (INHALATION) ONCE AS NEEDED
Status: DISCONTINUED | OUTPATIENT
Start: 2023-08-01 | End: 2023-08-01 | Stop reason: HOSPADM

## 2023-08-01 RX ORDER — FLUMAZENIL 0.1 MG/ML
0.5 INJECTION INTRAVENOUS AS NEEDED
Status: DISCONTINUED | OUTPATIENT
Start: 2023-08-01 | End: 2023-08-01 | Stop reason: HOSPADM

## 2023-08-01 RX ORDER — PROMETHAZINE HYDROCHLORIDE 25 MG/1
25 TABLET ORAL ONCE AS NEEDED
Status: DISCONTINUED | OUTPATIENT
Start: 2023-08-01 | End: 2023-08-01 | Stop reason: HOSPADM

## 2023-08-01 RX ORDER — SODIUM CHLORIDE, SODIUM LACTATE, POTASSIUM CHLORIDE, CALCIUM CHLORIDE 600; 310; 30; 20 MG/100ML; MG/100ML; MG/100ML; MG/100ML
1000 INJECTION, SOLUTION INTRAVENOUS CONTINUOUS
Status: DISCONTINUED | OUTPATIENT
Start: 2023-08-01 | End: 2023-08-01 | Stop reason: HOSPADM

## 2023-08-01 RX ORDER — MIDAZOLAM HYDROCHLORIDE 1 MG/ML
INJECTION INTRAMUSCULAR; INTRAVENOUS AS NEEDED
Status: DISCONTINUED | OUTPATIENT
Start: 2023-08-01 | End: 2023-08-01 | Stop reason: SURG

## 2023-08-01 RX ORDER — BACITRACIN ZINC 500 [USP'U]/G
OINTMENT TOPICAL AS NEEDED
Status: DISCONTINUED | OUTPATIENT
Start: 2023-08-01 | End: 2023-08-01 | Stop reason: HOSPADM

## 2023-08-01 RX ORDER — MIDAZOLAM HYDROCHLORIDE 1 MG/ML
2 INJECTION INTRAMUSCULAR; INTRAVENOUS
Status: DISCONTINUED | OUTPATIENT
Start: 2023-08-01 | End: 2023-08-01 | Stop reason: HOSPADM

## 2023-08-01 RX ORDER — PROMETHAZINE HYDROCHLORIDE 25 MG/1
25 SUPPOSITORY RECTAL ONCE AS NEEDED
Status: DISCONTINUED | OUTPATIENT
Start: 2023-08-01 | End: 2023-08-01 | Stop reason: HOSPADM

## 2023-08-01 RX ORDER — FENTANYL CITRATE 50 UG/ML
100 INJECTION, SOLUTION INTRAMUSCULAR; INTRAVENOUS
Status: DISCONTINUED | OUTPATIENT
Start: 2023-08-01 | End: 2023-08-01 | Stop reason: HOSPADM

## 2023-08-01 RX ORDER — PHENYLEPHRINE HCL IN 0.9% NACL 1 MG/10 ML
SYRINGE (ML) INTRAVENOUS AS NEEDED
Status: DISCONTINUED | OUTPATIENT
Start: 2023-08-01 | End: 2023-08-01 | Stop reason: SURG

## 2023-08-01 RX ORDER — NALOXONE HCL 0.4 MG/ML
0.4 VIAL (ML) INJECTION AS NEEDED
Status: DISCONTINUED | OUTPATIENT
Start: 2023-08-01 | End: 2023-08-01 | Stop reason: HOSPADM

## 2023-08-01 RX ORDER — EPHEDRINE SULFATE 5 MG/ML
5 INJECTION INTRAVENOUS ONCE AS NEEDED
Status: DISCONTINUED | OUTPATIENT
Start: 2023-08-01 | End: 2023-08-01 | Stop reason: HOSPADM

## 2023-08-01 RX ORDER — IBUPROFEN 400 MG/1
600 TABLET ORAL ONCE AS NEEDED
Status: DISCONTINUED | OUTPATIENT
Start: 2023-08-01 | End: 2023-08-01 | Stop reason: HOSPADM

## 2023-08-01 RX ADMIN — SODIUM CHLORIDE, SODIUM LACTATE, POTASSIUM CHLORIDE, AND CALCIUM CHLORIDE: .6; .31; .03; .02 INJECTION, SOLUTION INTRAVENOUS at 09:58

## 2023-08-01 RX ADMIN — ONDANSETRON 4 MG: 2 INJECTION INTRAMUSCULAR; INTRAVENOUS at 10:26

## 2023-08-01 RX ADMIN — LIDOCAINE HYDROCHLORIDE 50 MG: 10 INJECTION, SOLUTION EPIDURAL; INFILTRATION; INTRACAUDAL; PERINEURAL at 10:03

## 2023-08-01 RX ADMIN — DEXAMETHASONE SODIUM PHOSPHATE 8 MG: 4 INJECTION, SOLUTION INTRAMUSCULAR; INTRAVENOUS at 10:19

## 2023-08-01 RX ADMIN — PROPOFOL 150 MG: 10 INJECTION, EMULSION INTRAVENOUS at 10:05

## 2023-08-01 RX ADMIN — FENTANYL CITRATE 50 MCG: 50 INJECTION, SOLUTION INTRAMUSCULAR; INTRAVENOUS at 10:03

## 2023-08-01 RX ADMIN — MIDAZOLAM 2 MG: 1 INJECTION INTRAMUSCULAR; INTRAVENOUS at 10:03

## 2023-08-01 RX ADMIN — CEFAZOLIN 2 G: 2 INJECTION, POWDER, FOR SOLUTION INTRAMUSCULAR; INTRAVENOUS at 10:02

## 2023-08-01 RX ADMIN — Medication 100 MCG: at 10:17

## 2023-08-01 RX ADMIN — HYDROCODONE BITARTRATE AND ACETAMINOPHEN 1 TABLET: 10; 325 TABLET ORAL at 11:23

## 2023-08-01 RX ADMIN — Medication 150 MCG: at 10:30

## 2023-08-01 RX ADMIN — Medication 150 MCG: at 10:23

## 2023-08-01 NOTE — OP NOTE
Operative Note    Eugenia Le  8/1/2023    Pre-op Diagnosis:   Lesion of skin of scalp [L98.9]    Post-op Diagnosis:     Post-Op Diagnosis Codes:     * Lesion of skin of scalp [L98.9]    Procedure/CPTr Codes:      Procedure(s):  EXCISION LESION from scalp, 1cm    Surgeon(s):  Solis Paris MD    Anesthesia: General    Staff:   Circulator: Chacha Dillon RN  Scrub Person: Betsy Roland    Estimated Blood Loss: minimal    Specimens:                ID Type Source Tests Collected by Time   A : SKIN LESION OF SCALP Tissue Head TISSUE PATHOLOGY EXAM Solis Paris MD 8/1/2023 1031         Drains: * No LDAs found *    Findings: Small raised skin lesion versus cyst on apex of scalp    Complications: None    Indication: Desires removal of lesion from apex of scalp    Operative Note:    The patient was seen, marked, and consented preoperatively.  She was then brought to the operating room and placed in supine position on the OR table.  General anesthetic was administered and a laryngeal mask airway was placed and secured by the anesthesia service.  The previously marked spot of the raised lesion on the apex of the scalp was reidentified.  Her hair was parted in this area to expose the lesion.  The area was prepped and draped.  A timeout was performed.    Lidocaine with epinephrine was then injected around the site.  Elliptical skin incision approximate 1 cm in length was then made fully encompassing the lesion.  Sharp dissection was then used to divide the tissues all the way down to the subcutaneous fat.  The skin lesion and possibly small cyst were then removed and passed off the field as specimen.  Points of bleeding were controlled electrocautery.  The skin incision was then closed using 3-0 interrupted Prolene sutures.  Bacitracin was placed at the site.  The patient was then awakened and returned to recovery.           This document has been electronically signed by Solis Paris  MD on August 1, 2023 10:43 EDT        Solis Paris MD     Date: 8/1/2023  Time: 10:42 EDT

## 2023-08-01 NOTE — H&P
"From office note 2023:  \"CHIEF COMPLAINT:     Scalp lesion     HISTORY OF PRESENT ILLNESS:     Eugenia Le is a 27 y.o. female who is referred today to discuss removal of a lesion from the top of her scalp.  She states that her  previously noted it.  She is unsure of how long its been there.  She states that it has gotten slightly larger since it was first noted.  She notes intermittent discomfort at the area.  She also believes it may be contributing to her migraine headaches.     Medical History        Past Medical History:   Diagnosis Date    Anxiety      Hidradenitis suppurativa 2023    Migraines              Surgical History         Past Surgical History:   Procedure Laterality Date     SECTION   2018    TEETH EXTRACTION   2019     All top teeth removed- wears dentures    TUBAL ABDOMINAL LIGATION   2018                    Prior to Admission medications    Medication Sig Start Date End Date Taking? Authorizing Provider   busPIRone (BUSPAR) 5 MG tablet Take 1 tablet by mouth Every Night. May also take 1 tablet 2 (Two) Times a Day As Needed (anxiety). 22   Yes Darius Rodarte APRN   ferrous gluconate 324 (37.5 Fe) MG tablet tablet Take 1 tablet by mouth Daily With Breakfast. 23   Yes Roddy Simeon MD   ketorolac (TORADOL) 10 MG tablet Take 1 tablet by mouth Every 6 (Six) Hours As Needed (Migraine headache) for up to 15 doses. 23   Yes Darius Rodarte APRN   L-Methylfolate-Algae (L-Methylfolate Forte) 15-90.314 MG capsule capsule Take 1 capsule by mouth Daily. 23   Yes Darius Rodarte APRN   omeprazole (priLOSEC) 40 MG capsule Take 1 capsule by mouth Daily for 90 days. 23 Yes Darius Rodarte APRN   ondansetron ODT (ZOFRAN-ODT) 4 MG disintegrating tablet Place 1 tablet on the tongue Every 6 (Six) Hours As Needed for Nausea or Vomiting for up to 15 doses. 23   Yes Bill Lutz MD   Rimegepant Sulfate (NURTEC) 75 MG tablet " dispersible tablet Take 1 tablet by mouth Daily As Needed (migraines). 23   Yes Darius Rodarte APRN   rOPINIRole (REQUIP) 0.5 MG tablet Take 1 tablet by mouth Every Night. Take 1 hour before bedtime. 23   Yes Tiffani Brown MD   sertraline (Zoloft) 100 MG tablet Take 1 tablet by mouth Daily. 23   Yes Darius Rodarte APRN   SUMAtriptan (Imitrex) 50 MG tablet Take one tablet at onset of headache. May repeat dose one time in 2 hours if headache not relieved. 5/15/23   Yes Darius Rodarte APRN   topiramate (TOPAMAX) 50 MG tablet Take 1/2 tablet nightly for one week then 1/2 tablet twice daily for one week then 1/2 tablet in the AM and 1 tablet in the Pm for one week than 1 tablet twice daily. 23   Yes Darius Rodarte APRN   ubrogepant (Ubrelvy) 100 MG tablet Take 1 tablet by mouth Daily As Needed (migraines). 23   Yes Darius Rodarte APRN   vitamin D (ERGOCALCIFEROL) 1.25 MG (28019 UT) capsule capsule Take 1 capsule by mouth once a week 23   Yes Darius Rodarte APRN         No Known Allergies           Family History   Problem Relation Age of Onset    Anxiety disorder Mother      Heart disease Father      Hypertension Father      Diverticulitis Father      Autism Sister      No Known Problems Daughter      No Known Problems Son      Diabetes Paternal Uncle           Social History   Social History            Socioeconomic History    Marital status:    Tobacco Use    Smoking status: Former       Packs/day: 0.25       Years: 1.00       Pack years: 0.25       Types: Cigarettes       Start date: 2014       Quit date: 2014       Years since quittin.5       Passive exposure: Past    Smokeless tobacco: Never   Vaping Use    Vaping Use: Never used   Substance and Sexual Activity    Alcohol use: Not Currently    Drug use: Never    Sexual activity: Yes       Partners: Male       Birth control/protection: Tubal ligation           "  Review of Systems  Neurological: Positive for headaches.            Objective         /80   Pulse 95   Temp 97.7 øF (36.5 øC) (Infrared)   Ht 149.9 cm (59\")   Wt 68 kg (150 lb)   SpO2 98%   BMI 30.30 kg/mý      Physical Exam  Constitutional:       General: She is not in acute distress.     Appearance: Normal appearance. She is not ill-appearing, toxic-appearing or diaphoretic.   HENT:      Head: Normocephalic and atraumatic.     Eyes:      General: No scleral icterus.        Right eye: No discharge.         Left eye: No discharge.      Extraocular Movements: Extraocular movements intact.      Conjunctiva/sclera: Conjunctivae normal.   Pulmonary:      Effort: Pulmonary effort is normal. No respiratory distress.   Skin:     General: Skin is warm.      Coloration: Skin is not jaundiced.   Neurological:      General: No focal deficit present.      Mental Status: She is alert and oriented to person, place, and time.   Psychiatric:         Mood and Affect: Mood normal.         Behavior: Behavior normal.         Thought Content: Thought content normal.         Judgment: Judgment normal.               ASSESSMENT:     Skin lesion or cyst of scalp     PLAN:     On exam the patient appears to have a nonpigmented skin lesion versus a small cyst at the vertex of her scalp.  It is somewhat sore to touch.  I do not believe that is the source of her headaches.  On the one hand she does think it is a source of her headaches, but on the other hand she also states that she believes her migraines are hereditary.  In any case, the patient would like the lesion removed as it does cause soreness and has slightly enlarged.  Given the location of the lesion I recommended that this be performed in the operating room.  She is agreeable to this.  The risks and benefits of excision of scalp lesion have been discussed.  She is scheduled for surgery.\"    She notes no changes in the area since her office visit. She is ready to have " it removed today.  The site was marked with her assistance.          This document has been electronically signed by Solis Paris MD on August 1, 2023 09:22 EDT

## 2023-08-02 LAB
LAB AP CASE REPORT: NORMAL
PATH REPORT.FINAL DX SPEC: NORMAL
PATH REPORT.GROSS SPEC: NORMAL

## 2023-08-03 ENCOUNTER — TELEPHONE (OUTPATIENT)
Dept: FAMILY MEDICINE CLINIC | Facility: CLINIC | Age: 28
End: 2023-08-03

## 2023-08-03 RX ORDER — ERGOCALCIFEROL 1.25 MG/1
50000 CAPSULE ORAL WEEKLY
Qty: 8 CAPSULE | Refills: 0 | Status: SHIPPED | OUTPATIENT
Start: 2023-08-03

## 2023-08-03 NOTE — TELEPHONE ENCOUNTER
Caller: Eugenia eL    Relationship to patient: Self    Best call back number: [phone]    Patient is needing: PATIENT CALLED AND SAID SHE WOULD LIKE TO BE SEEN FOR HER GALLBLADDER     SHE SAID SHE HAS PAIN IN HER RIGHT SIDE THAT WRAPS AROUND HER ABDOMEN AND SHE IS BLOATED A LOT    SHE SAID SHE SEEMS TO BE IN PAIN ALL THE TIME ON HER RIGHT SIDE AND HAS HAD A LOT OF PEOPLE IN HER FAMILY WHO HAS HAD TO HAVE THEIR GALLBLADDER REMOVED     SHE IS WANTING TO SEE IF SHE CAN GET A REFERRAL TO SOMEONE FOR THIS, OR IF SOMEONE ELSE CAN SEE HER SINCE SHE UNDERSTANDS THAT [facility] IS OUT OF THE OFFICE CURRENTLY     REQUESTED CALLBACK

## 2023-08-03 NOTE — TELEPHONE ENCOUNTER
CALLED AND SPOKE WITH PT AND GAVE HER DATE FOR AUG 16TH THAT WAS THE NEXT AVAILABLE WITH [facility] PT STATED THAT SHE DIDN'T KNOW IF SHE WOULD BE BACK IN TOWN BY THEN

## 2023-08-09 ENCOUNTER — OFFICE VISIT (OUTPATIENT)
Dept: SURGERY | Facility: CLINIC | Age: 28
End: 2023-08-09
Payer: MEDICAID

## 2023-08-09 VITALS
HEART RATE: 91 BPM | HEIGHT: 57 IN | SYSTOLIC BLOOD PRESSURE: 133 MMHG | OXYGEN SATURATION: 99 % | TEMPERATURE: 98.6 F | DIASTOLIC BLOOD PRESSURE: 85 MMHG | WEIGHT: 158.6 LBS | BODY MASS INDEX: 34.22 KG/M2

## 2023-08-09 DIAGNOSIS — Z09 ENCOUNTER FOR FOLLOW-UP: Primary | ICD-10-CM

## 2023-08-09 PROCEDURE — 1159F MED LIST DOCD IN RCRD: CPT | Performed by: SURGERY

## 2023-08-09 PROCEDURE — 99024 POSTOP FOLLOW-UP VISIT: CPT | Performed by: SURGERY

## 2023-08-09 PROCEDURE — 1160F RVW MEDS BY RX/DR IN RCRD: CPT | Performed by: SURGERY

## 2023-08-09 RX ORDER — CLINDAMYCIN PHOSPHATE 10 MG/G
GEL TOPICAL
COMMUNITY
Start: 2023-04-29

## 2023-08-09 RX ORDER — FLUTICASONE PROPIONATE 220 UG/1
AEROSOL, METERED RESPIRATORY (INHALATION)
COMMUNITY
Start: 2023-07-10

## 2023-08-09 NOTE — PROGRESS NOTES
CHIEF COMPLAINT:    Chief Complaint   Patient presents with    Post-op     Excision of Scalp Lesion on 8/1/23       HISTORY OF PRESENT ILLNESS:    Eugenia Le is a 27 y.o. female who underwent excision of a lesion from her scalp on 8/1/2023.  She returns today for follow-up.  She notes no issues with the area.  Her benign pathology results were discussed with her today.    EXAM:  Vitals:    08/09/23 1433   BP: 133/85   Pulse: 91   Temp: 98.6 øF (37 øC)   SpO2: 99%         Well-healed scalp incision, sutures removed today    ASSESSMENT:    Status post scalp lesion excision    PLAN:    Overall she appears to have healed well.  She can see us as needed.          This document has been electronically signed by Solis Paris MD on August 9, 2023 14:39 EDT

## 2023-10-11 DIAGNOSIS — G43.111 INTRACTABLE MIGRAINE WITH AURA WITH STATUS MIGRAINOSUS: ICD-10-CM

## 2023-10-11 RX ORDER — SUMATRIPTAN 50 MG/1
TABLET, FILM COATED ORAL
Qty: 9 TABLET | Refills: 3 | Status: SHIPPED | OUTPATIENT
Start: 2023-10-11

## 2023-10-11 NOTE — TELEPHONE ENCOUNTER
Can refill this request however, pt will need to schedule a follow up in order to receive future refills

## 2023-11-03 ENCOUNTER — HOSPITAL ENCOUNTER (EMERGENCY)
Facility: HOSPITAL | Age: 28
Discharge: HOME | End: 2023-11-04
Attending: EMERGENCY MEDICINE
Payer: MEDICAID

## 2023-11-03 VITALS
BODY MASS INDEX: 31.28 KG/M2 | SYSTOLIC BLOOD PRESSURE: 124 MMHG | WEIGHT: 145 LBS | DIASTOLIC BLOOD PRESSURE: 92 MMHG | HEART RATE: 108 BPM | RESPIRATION RATE: 17 BRPM | TEMPERATURE: 98 F | HEIGHT: 57 IN | OXYGEN SATURATION: 97 %

## 2023-11-03 DIAGNOSIS — G43.109 MIGRAINE WITH AURA AND WITHOUT STATUS MIGRAINOSUS, NOT INTRACTABLE: Primary | ICD-10-CM

## 2023-11-03 PROCEDURE — 99283 EMERGENCY DEPT VISIT LOW MDM: CPT

## 2023-11-03 RX ORDER — KETOROLAC TROMETHAMINE 30 MG/ML
60 INJECTION, SOLUTION INTRAMUSCULAR; INTRAVENOUS ONCE
Status: COMPLETED | OUTPATIENT
Start: 2023-11-04 | End: 2023-11-04

## 2023-11-03 RX ORDER — METHYLPREDNISOLONE SODIUM SUCCINATE 40 MG/ML
80 INJECTION, POWDER, LYOPHILIZED, FOR SOLUTION INTRAMUSCULAR; INTRAVENOUS ONCE
Status: COMPLETED | OUTPATIENT
Start: 2023-11-04 | End: 2023-11-04

## 2023-11-03 RX ORDER — ONDANSETRON 4 MG/1
8 TABLET, ORALLY DISINTEGRATING ORAL ONCE
Status: COMPLETED | OUTPATIENT
Start: 2023-11-04 | End: 2023-11-04

## 2023-11-04 PROCEDURE — 25010000002 KETOROLAC TROMETHAMINE PER 15 MG: Performed by: EMERGENCY MEDICINE

## 2023-11-04 PROCEDURE — 63710000001 ONDANSETRON ODT 4 MG TABLET DISPERSIBLE: Performed by: EMERGENCY MEDICINE

## 2023-11-04 PROCEDURE — 25010000002 METHYLPREDNISOLONE PER 40 MG: Performed by: EMERGENCY MEDICINE

## 2023-11-04 PROCEDURE — 96372 THER/PROPH/DIAG INJ SC/IM: CPT

## 2023-11-04 RX ORDER — KETOROLAC TROMETHAMINE 30 MG/ML
INJECTION, SOLUTION INTRAMUSCULAR; INTRAVENOUS
Status: DISCONTINUED
Start: 2023-11-04 | End: 2023-11-04 | Stop reason: HOSPADM

## 2023-11-04 RX ORDER — PROMETHAZINE HYDROCHLORIDE 25 MG/1
25 TABLET ORAL EVERY 6 HOURS PRN
Qty: 15 TABLET | Refills: 0 | Status: SHIPPED | OUTPATIENT
Start: 2023-11-04

## 2023-11-04 RX ORDER — KETOROLAC TROMETHAMINE 10 MG/1
10 TABLET, FILM COATED ORAL EVERY 6 HOURS PRN
Qty: 15 TABLET | Refills: 0 | Status: SHIPPED | OUTPATIENT
Start: 2023-11-04

## 2023-11-04 RX ADMIN — KETOROLAC TROMETHAMINE 60 MG: 30 INJECTION, SOLUTION INTRAMUSCULAR; INTRAVENOUS at 00:13

## 2023-11-04 RX ADMIN — METHYLPREDNISOLONE SODIUM SUCCINATE 80 MG: 40 INJECTION, POWDER, FOR SOLUTION INTRAMUSCULAR; INTRAVENOUS at 00:13

## 2023-11-04 RX ADMIN — ONDANSETRON 8 MG: 4 TABLET, ORALLY DISINTEGRATING ORAL at 00:12

## 2023-11-04 NOTE — ED PROVIDER NOTES
Subjective   History of Present Illness  Eugenia Le is a 28-year-old white female who presents secondary to migraine headache.  Patient reports onset of headaches this morning.  Headaches are preceded by visual aura.  Patient having pain in the right occipital region.  Associated nausea and vomiting.  Patient has vomited approximately 6 times.  No photophobia or phonophobia.  Patient has taken sumatriptan and Topamax without improvement.  Thus she presents for evaluation.    History provided by:  Patient      Review of Systems   Constitutional: Negative.  Negative for fever.   HENT:  Negative for rhinorrhea.    Eyes: Negative.  Negative for redness.   Respiratory:  Negative for cough.    Cardiovascular:  Negative for chest pain.   Gastrointestinal:  Positive for nausea and vomiting. Negative for abdominal pain.   Endocrine: Negative.    Genitourinary: Negative.  Negative for difficulty urinating.   Musculoskeletal: Negative.  Negative for back pain.   Skin: Negative.  Negative for color change.   Neurological:  Positive for headaches. Negative for syncope.   Hematological: Negative.    Psychiatric/Behavioral: Negative.     All other systems reviewed and are negative.      Past Medical History:   Diagnosis Date    Anxiety     Hidradenitis suppurativa 2023    Migraines        No Known Allergies    Past Surgical History:   Procedure Laterality Date     SECTION  2018    EXCISION LESION N/A 2023    Procedure: EXCISION LESION from scalp;  Surgeon: Solis Paris MD;  Location: [facility] OR;  Service: General;  Laterality: N/A;    TEETH EXTRACTION  2019    All top teeth removed- wears dentures    TUBAL ABDOMINAL LIGATION  2018       Family History   Problem Relation Age of Onset    Anxiety disorder Mother     Heart disease Father     Hypertension Father     Diverticulitis Father     Autism Sister     No Known Problems Daughter     No Known Problems Son     Diabetes Paternal Uncle         Social History     Socioeconomic History    Marital status:    Tobacco Use    Smoking status: Former     Packs/day: 0.25     Years: 1.00     Additional pack years: 0.00     Total pack years: 0.25     Types: Cigarettes     Start date: 2014     Quit date: 2014     Years since quittin.9     Passive exposure: Past    Smokeless tobacco: Never   Vaping Use    Vaping Use: Never used   Substance and Sexual Activity    Alcohol use: Not Currently    Drug use: Never    Sexual activity: Yes     Partners: Male     Birth control/protection: Tubal ligation           Objective   Physical Exam  Vitals and nursing note reviewed.   Constitutional:       General: She is not in acute distress.     Appearance: Normal appearance. She is well-developed. She is not ill-appearing, toxic-appearing or diaphoretic.      Comments: 28-year-old white female laying in bed.  Patient in right lateral decubitus position.  Empty emesis bag in hand.  Patient has been retching periodically since ER arrival.  Vital signs notable for heart rate of 108.  Otherwise unremarkable.  Patient appears in good overall health.  Overhead lights are on.  Patient does not exhibit any photophobia.   HENT:      Head: Normocephalic and atraumatic.      Jaw: There is normal jaw occlusion.      Comments: Mastoid process unremarkable.     Right Ear: Tympanic membrane, ear canal and external ear normal.      Left Ear: Tympanic membrane, ear canal and external ear normal.      Nose: Nose normal.      Mouth/Throat:      Mouth: Mucous membranes are moist.      Pharynx: Oropharynx is clear.   Eyes:      Extraocular Movements: Extraocular movements intact.      Conjunctiva/sclera: Conjunctivae normal.      Pupils: Pupils are equal, round, and reactive to light.   Cardiovascular:      Rate and Rhythm: Normal rate and regular rhythm.      Pulses: Normal pulses.      Heart sounds: Normal heart sounds. No murmur heard.     No friction rub. No gallop.    Pulmonary:      Effort: Pulmonary effort is normal.      Breath sounds: Normal breath sounds.   Abdominal:      General: Bowel sounds are normal. There is no distension.      Palpations: Abdomen is soft. There is no mass.      Tenderness: There is no abdominal tenderness. There is no guarding or rebound.      Hernia: No hernia is present.   Musculoskeletal:         General: Normal range of motion.      Cervical back: Normal range of motion and neck supple.   Skin:     General: Skin is warm and dry.      Capillary Refill: Capillary refill takes less than 2 seconds.   Neurological:      General: No focal deficit present.      Mental Status: She is alert and oriented to person, place, and time.      Deep Tendon Reflexes: Reflexes are normal and symmetric.   Psychiatric:         Mood and Affect: Mood normal.         Behavior: Behavior normal.         Procedures           ED Course  ED Course as of 11/04/23 0049   Fri Nov 03, 2023   2353 Patient drove self to the ED.  Thus limiting medications I can give her for migraine headache.  Giving Toradol, Solu-Medrol and Zofran. [SS]   Sat Nov 04, 2023   0046 Patient reports no improvement of symptoms.  However she cannot provide a .  Thus I cannot give additional medications due to potential drowsiness.  Patient acknowledges understanding of this.  I will prescribe medications for home.    Prescription  1-Toradol  2-Phenergan [SS]      ED Course User Index  [SS] Bill Lutz MD      My differential diagnosis for headache includes but is not limited to:  Migraine, cluster, ischemic stroke, subarachnoid hemorrhage, intracranial hemorrhage, vascular malformation, cerebral aneurysm, vascular dissection, vasculitis, temporal arteritis, malignant hypertension, pheochromocytoma, cerebral venous thrombosis, preeclampsia; bacterial meningitis, viral meningitis, fungal meningitis, encephalitis, brain abscess, pleural empyema, sinusitis, dental infection, influenza, viral  syndrome; carbon monoxide exposure, analgesic abuse, hypoglycemia; trigeminal neuralgia, postherpetic neuralgia, occipital neuralgia; subdural hematoma, concussion, musculoskeletal tension, cervical osteoarthritis; glaucoma, TMJ disease, pseudotumor cerebri, post LP headache, intracranial neoplasm, sleep apnea                                       Medical Decision Making  Risk  Prescription drug management.        Final diagnoses:   Migraine with aura and without status migrainosus, not intractable       ED Disposition  ED Disposition       ED Disposition   Discharge    Condition   Stable    Comment   --               Darius Rodarte, APRN  [address] [address]  [phone]    Schedule an appointment as soon as possible for a visit in 1 week  for continued or worsened symptoms, Sooner if needed         Medication List        New Prescriptions      ketorolac 10 MG tablet  Commonly known as: TORADOL  Take 1 tablet by mouth Every 6 (Six) Hours As Needed for Moderate Pain or Severe Pain (Migraine headache) for up to 15 doses.     promethazine 25 MG tablet  Commonly known as: PHENERGAN  Take 1 tablet by mouth Every 6 (Six) Hours As Needed for Nausea or Vomiting for up to 15 doses.               Where to Get Your Medications        These medications were sent to [id number] - [place] - [address] - [phone]  - [phone] FX  [address], [place] [address]      Phone: [phone]   ketorolac 10 MG tablet  promethazine 25 MG tablet            Bill Lutz MD  11/04/23 0049

## 2023-11-16 DIAGNOSIS — G43.111 INTRACTABLE MIGRAINE WITH AURA WITH STATUS MIGRAINOSUS: ICD-10-CM

## 2023-12-08 RX ORDER — SUMATRIPTAN 50 MG/1
TABLET, FILM COATED ORAL
Qty: 9 TABLET | Refills: 3 | Status: SHIPPED | OUTPATIENT
Start: 2023-12-08

## 2023-12-08 NOTE — TELEPHONE ENCOUNTER
Hub staff attempted to follow warm transfer process and was unsuccessful     Caller: Eugenia Le    Relationship to patient: Self    Best call back number: [phone]     Patient is needing: PATIENT IS REQUESTING AN UPDATE ON THIS MEDICATION REFILL    PLEASE CALL PATIENT TO ADVISE

## 2024-01-02 DIAGNOSIS — G43.111 INTRACTABLE MIGRAINE WITH AURA WITH STATUS MIGRAINOSUS: ICD-10-CM

## 2024-01-02 RX ORDER — SUMATRIPTAN 50 MG/1
TABLET, FILM COATED ORAL
Qty: 9 TABLET | Refills: 3 | Status: SHIPPED | OUTPATIENT
Start: 2024-01-02

## 2024-02-19 ENCOUNTER — OFFICE VISIT (OUTPATIENT)
Dept: INTERNAL MEDICINE | Facility: CLINIC | Age: 29
End: 2024-02-19
Payer: COMMERCIAL

## 2024-02-19 VITALS
DIASTOLIC BLOOD PRESSURE: 78 MMHG | WEIGHT: 152.6 LBS | TEMPERATURE: 97.8 F | OXYGEN SATURATION: 98 % | HEIGHT: 57 IN | BODY MASS INDEX: 32.92 KG/M2 | SYSTOLIC BLOOD PRESSURE: 116 MMHG | HEART RATE: 111 BPM

## 2024-02-19 DIAGNOSIS — F41.1 GAD (GENERALIZED ANXIETY DISORDER): ICD-10-CM

## 2024-02-19 DIAGNOSIS — K21.00 GASTROESOPHAGEAL REFLUX DISEASE WITH ESOPHAGITIS WITHOUT HEMORRHAGE: ICD-10-CM

## 2024-02-19 DIAGNOSIS — F33.1 MODERATE EPISODE OF RECURRENT MAJOR DEPRESSIVE DISORDER: ICD-10-CM

## 2024-02-19 DIAGNOSIS — E55.9 VITAMIN D DEFICIENCY: ICD-10-CM

## 2024-02-19 DIAGNOSIS — G43.109 MIGRAINE WITH AURA AND WITHOUT STATUS MIGRAINOSUS, NOT INTRACTABLE: ICD-10-CM

## 2024-02-19 DIAGNOSIS — G25.81 RLS (RESTLESS LEGS SYNDROME): ICD-10-CM

## 2024-02-19 DIAGNOSIS — Z00.00 ROUTINE HEALTH MAINTENANCE: ICD-10-CM

## 2024-02-19 DIAGNOSIS — Z76.89 ENCOUNTER TO ESTABLISH CARE: Primary | ICD-10-CM

## 2024-02-19 DIAGNOSIS — D50.8 IRON DEFICIENCY ANEMIA SECONDARY TO INADEQUATE DIETARY IRON INTAKE: ICD-10-CM

## 2024-02-19 DIAGNOSIS — F42.2 MIXED OBSESSIONAL THOUGHTS AND ACTS: ICD-10-CM

## 2024-02-19 PROBLEM — E66.811 OBESITY (BMI 30.0-34.9): Status: RESOLVED | Noted: 2023-02-13 | Resolved: 2024-02-19

## 2024-02-19 PROBLEM — E66.9 OBESITY (BMI 30.0-34.9): Status: RESOLVED | Noted: 2023-02-13 | Resolved: 2024-02-19

## 2024-02-19 PROCEDURE — 1159F MED LIST DOCD IN RCRD: CPT | Performed by: NURSE PRACTITIONER

## 2024-02-19 PROCEDURE — 1160F RVW MEDS BY RX/DR IN RCRD: CPT | Performed by: NURSE PRACTITIONER

## 2024-02-19 PROCEDURE — 99214 OFFICE O/P EST MOD 30 MIN: CPT | Performed by: NURSE PRACTITIONER

## 2024-02-19 RX ORDER — KETOROLAC TROMETHAMINE 10 MG/1
10 TABLET, FILM COATED ORAL EVERY 6 HOURS PRN
Qty: 15 TABLET | Refills: 0 | Status: SHIPPED | OUTPATIENT
Start: 2024-02-19

## 2024-02-19 RX ORDER — TOPIRAMATE 50 MG/1
50 TABLET, FILM COATED ORAL 2 TIMES DAILY
Qty: 180 TABLET | Refills: 0 | Status: SHIPPED | OUTPATIENT
Start: 2024-02-19

## 2024-02-19 RX ORDER — OMEPRAZOLE 40 MG/1
40 CAPSULE, DELAYED RELEASE ORAL DAILY
COMMUNITY

## 2024-02-19 NOTE — PROGRESS NOTES
Subjective    Eugenia Le is a 28 y.o. female presenting today for   Chief Complaint   Patient presents with    Establish Care    Anemia     Would like Fe levels checked         Eugenia Le presents today as a new patient to me to establish care.   Prior PCP was Darius Rodarte.    Patient Care Team:  Maribel Estes APRN as PCP - General (Family Medicine)    Current/chronic health conditions include:    Patient Active Problem List   Diagnosis    Migraines    Iron deficiency anemia    Anxiety    RLS (restless legs syndrome)    VIDAL (generalized anxiety disorder)    Gastroesophageal reflux disease with esophagitis without hemorrhage    Moderate episode of recurrent major depressive disorder    Mixed obsessional thoughts and acts    Vitamin D deficiency       Outpatient Medications Marked as Taking for the 2/19/24 encounter (Office Visit) with Maribel Estes APRN   Medication Sig Dispense Refill    busPIRone (BUSPAR) 5 MG tablet Take 1 tablet by mouth Every Night. May also take 1 tablet 2 (Two) Times a Day As Needed (anxiety). 90 tablet 1    clindamycin (CLINDAGEL) 1 % gel APPLY THIN LAYER TOPICALLY TO AFFECTED AREA ONCE DAILY      ferrous gluconate 324 (37.5 Fe) MG tablet tablet Take 1 tablet by mouth Daily With Breakfast. 30 tablet 5    L-Methylfolate-Algae (L-Methylfolate Forte) 15-90.314 MG capsule capsule Take 1 capsule by mouth Daily. 30 capsule 0    ondansetron ODT (ZOFRAN-ODT) 4 MG disintegrating tablet Place 1 tablet on the tongue Every 6 (Six) Hours As Needed for Nausea or Vomiting for up to 15 doses. 15 tablet 0    promethazine (PHENERGAN) 25 MG tablet Take 1 tablet by mouth Every 6 (Six) Hours As Needed for Nausea or Vomiting for up to 15 doses. 15 tablet 0    rOPINIRole (REQUIP) 0.5 MG tablet Take 1 tablet by mouth Every Night. Take 1 hour before bedtime. 30 tablet 2    sertraline (ZOLOFT) 100 MG tablet Take 1 tablet by mouth once daily 90 tablet 0    SUMAtriptan (Imitrex) 50 MG tablet  "Take one tablet at onset of headache. May repeat dose one time in 2 hours if headache not relieved. 9 tablet 3    topiramate (TOPAMAX) 50 MG tablet Take 1/2 tablet nightly for one week then 1/2 tablet twice daily for one week then 1/2 tablet in the AM and 1 tablet in the Pm for one week than 1 tablet twice daily. 60 tablet 2    vitamin D (ERGOCALCIFEROL) 1.25 MG (63489 UT) capsule capsule Take 1 capsule by mouth once a week 8 capsule 0       ISABEL - takes iron supplement, levels have not been checked in about one yr    GERD - EGD in 6/8/2023 w/ esophagitis    Migraine - since 2010. Nml head CT 11/2022. She takes Topamax QD for approx 1yr. Reports 12 migraines/mo. A/W N/V. Takes Imitrex 8x/mo. This generally works well but occas she will need to repeat. She had previously been prescribed Toradol to take in addition to Imitrex and this combo worked better. +visual aura.    RLS - well controlled w/ Requip    Mental Health - dx include VIDAL, MDD, and OCD. Her medication regimen includes Zoloft, Buspar, and L-methylfolate. Reports has been well controlled on this regimen for about the last year. Is not in counseling d/t wait time in scheduling new client appt.    Vit D def - on supp    Last PAP was approx 6yrs ago.      The following portions of the patient's history were reviewed and updated as appropriate: allergies, current medications, problem list, past medical history, past surgical history, family history, and social history.         Objective    Vitals:    02/19/24 0843   BP: 116/78   BP Location: Left arm   Patient Position: Sitting   Cuff Size: Adult   Pulse: 111   Temp: 97.8 °F (36.6 °C)   TempSrc: Infrared   SpO2: 98%   Weight: 69.2 kg (152 lb 9.6 oz)   Height: 144.8 cm (57.01\")     Body mass index is 33.01 kg/m².  Nursing notes and vitals reviewed.    Physical Exam  Constitutional:       General: She is not in acute distress.     Appearance: She is well-developed.   Pulmonary:      Effort: Pulmonary effort is " normal.   Neurological:      Mental Status: She is alert.   Psychiatric:         Attention and Perception: She is attentive.         Speech: Speech normal.           CT Head Without Contrast (11/25/2022 14:07)   Findings:  Superficial soft tissues appear within normal limits. The calvarium is  intact.  Paranasal sinuses and mastoid air cells appear well aerated.   Orbits are unremarkable.  There is no acute intracranial hemorrhage.  No  mass effect or midline shift.  No abnormal extra-axial collections.   Gray-white differentiation is within normal limits.  There are no focal  hypoattenuating lesions.  Ventricular size and configuration is normal  for age.        IMPRESSION:  No acute intracranial abnormality.      Assessment and Plan    Diagnoses and all orders for this visit:    1. Encounter to establish care (Primary)    2. Iron deficiency anemia secondary to inadequate dietary iron intake  -     CBC (No Diff); Future  -     Comprehensive Metabolic Panel; Future  -     Ferritin; Future  -     Folate; Future  -     Iron Profile; Future  -     Vitamin B12; Future    3. Gastroesophageal reflux disease with esophagitis without hemorrhage  -     Comprehensive Metabolic Panel; Future    4. Migraine with aura and without status migrainosus, not intractable  -     topiramate (TOPAMAX) 50 MG tablet; Take 1 tablet by mouth 2 (Two) Times a Day.  Dispense: 180 tablet; Refill: 0  -     ketorolac (TORADOL) 10 MG tablet; Take 1 tablet by mouth Every 6 (Six) Hours As Needed (Migraine headache) for up to 15 doses.  Dispense: 15 tablet; Refill: 0  -     TSH; Future    5. VIDAL (generalized anxiety disorder)  -     TSH; Future    6. Moderate episode of recurrent major depressive disorder  -     TSH; Future    7. Mixed obsessional thoughts and acts    8. RLS (restless legs syndrome)    9. Vitamin D deficiency  -     Vitamin D,25-Hydroxy; Future    10. Routine health maintenance  -     CBC (No Diff); Future  -     Comprehensive  Metabolic Panel; Future  -     Ferritin; Future  -     Folate; Future  -     Iron Profile; Future  -     Lipid Panel With / Chol / HDL Ratio; Future  -     TSH; Future  -     Vitamin B12; Future  -     Vitamin D,25-Hydroxy; Future  -     Urinalysis With Microscopic - Urine, Clean Catch; Future  -     Hepatitis C Antibody; Future        4. Uncontrolled.  Increase Topamax to 50mg qam and 25mg qpm for one week and then 50mg BID.  Continue Imitrex.  Restart Toradol for PRN.      Except as noted above, pt will continue current medications as noted in the medication list. I will continue to authorize refills as needed.        Medications, including side effects, were discussed with the patient. Patient verbalized understanding.  The plan of care was discussed. All questions were answered. Patient verbalized understanding.        Return for ASAP, fasting labs one week prior to, Annual Physical with PAP.

## 2024-02-22 ENCOUNTER — PATIENT ROUNDING (BHMG ONLY) (OUTPATIENT)
Dept: INTERNAL MEDICINE | Facility: CLINIC | Age: 29
End: 2024-02-22
Payer: COMMERCIAL

## 2024-02-22 NOTE — PROGRESS NOTES
My name is Irasema Rojas and I am the Referral clerk at [facility].     I would like  to officially welcome you to our practice and ask about your recent visit.     Tell me about your visit with us. What things went well?        We're always looking for ways to make our patients' experiences even better. Do you have recommendations on ways we may improve?      Overall were you satisfied with your first visit to our practice?        I appreciate you taking the time to answer these questions. Is there anything else I can do for you?       Thank you, and have a great day.     Irasema

## 2024-03-08 DIAGNOSIS — G43.111 INTRACTABLE MIGRAINE WITH AURA WITH STATUS MIGRAINOSUS: ICD-10-CM

## 2024-03-08 RX ORDER — SUMATRIPTAN 50 MG/1
TABLET, FILM COATED ORAL
Qty: 9 TABLET | Refills: 3 | Status: SHIPPED | OUTPATIENT
Start: 2024-03-08

## 2024-03-11 DIAGNOSIS — G25.81 RLS (RESTLESS LEGS SYNDROME): ICD-10-CM

## 2024-03-11 NOTE — TELEPHONE ENCOUNTER
Caller: Eugenia Le    Relationship: Self    Best call back number: [phone]    Requested Prescriptions:   Requested Prescriptions     Pending Prescriptions Disp Refills    rOPINIRole (REQUIP) 0.5 MG tablet 30 tablet 2     Sig: Take 1 tablet by mouth Every Night. Take 1 hour before bedtime.        Pharmacy where request should be sent: [id number] - [facility] [place] - [address] [phone] [facility] [phone] FX     Last office visit with prescribing clinician: 2/19/2024   Last telemedicine visit with prescribing clinician: Visit date not found   Next office visit with prescribing clinician: 3/22/2024     Additional details provided by patient: PATIENT IS OUT OF THIS MEDICATION.     Does the patient have less than a 3 day supply:  [x] Yes  [] No    Would you like a call back once the refill request has been completed: [] Yes [x] No    If the office needs to give you a call back, can they leave a voicemail: [] Yes [x] No    Julianne Jennings Rep   03/11/24 15:19 EDT

## 2024-03-12 RX ORDER — ROPINIROLE 0.5 MG/1
0.5 TABLET, FILM COATED ORAL NIGHTLY
Qty: 30 TABLET | Refills: 0 | Status: SHIPPED | OUTPATIENT
Start: 2024-03-12

## 2024-03-12 NOTE — TELEPHONE ENCOUNTER
Most recent eGFR is above 30 in the past 12 months.    AST < 55 OR ALT < 90 in the past 12 months     Rx Refill Note  Requested Prescriptions     Pending Prescriptions Disp Refills    rOPINIRole (REQUIP) 0.5 MG tablet 30 tablet 2     Sig: Take 1 tablet by mouth Every Night. Take 1 hour before bedtime.      Last office visit with prescribing clinician: 2/19/2024   Last telemedicine visit with prescribing clinician: Visit date not found   Next office visit with prescribing clinician: 3/22/2024                         Would you like a call back once the refill request has been completed: [] Yes [] No    If the office needs to give you a call back, can they leave a voicemail: [] Yes [] No    Augustus Garcia MA  03/12/24, 12:22 EDT

## 2024-03-14 ENCOUNTER — TELEPHONE (OUTPATIENT)
Dept: INTERNAL MEDICINE | Facility: CLINIC | Age: 29
End: 2024-03-14
Payer: COMMERCIAL

## 2024-03-14 NOTE — TELEPHONE ENCOUNTER
OK FOR HUB TO READ.  LVM for patient regarding rescheduling appt for physical, going over blood work and med refill. I returned call to patient to let her know the appointment needs to be in person and I went ahead and rescheduled but need to confirm date and time reserved will work.

## 2024-03-15 ENCOUNTER — TELEPHONE (OUTPATIENT)
Dept: INTERNAL MEDICINE | Facility: CLINIC | Age: 29
End: 2024-03-15

## 2024-03-15 DIAGNOSIS — G43.109 MIGRAINE WITH AURA AND WITHOUT STATUS MIGRAINOSUS, NOT INTRACTABLE: ICD-10-CM

## 2024-03-15 RX ORDER — KETOROLAC TROMETHAMINE 10 MG/1
10 TABLET, FILM COATED ORAL EVERY 6 HOURS PRN
Qty: 15 TABLET | Refills: 0 | Status: SHIPPED | OUTPATIENT
Start: 2024-03-15

## 2024-03-15 NOTE — TELEPHONE ENCOUNTER
Pharmacy Name:  [id number] - [place], [place] - [address] [facility] [phone] [facility] [phone]      Pharmacy representative name: ELIO    Pharmacy representative phone number: [phone]     What medication are you calling in regards to: ketorolac (TORADOL) 10 MG tablet     What question does the pharmacy have: PHARMACY IS REQUESTING TO VERIFY WHETHER OR NOT THE PATIENT WAS GIVEN A LOADING DOSE OF THIS MEDICATION WHILE IN OFFICE OR NOT.    PHARMACY STATED THIS MEDICATION CANNOT BE DISPENSED UNLESS A LOADING DOSE IF GIVEN.    PLEASE CALL TO DISCUSS AND ADVISE.

## 2024-03-15 NOTE — TELEPHONE ENCOUNTER
Normal serum potassium in past 12 months    GFR >45 ml/min in past year    AST less than 55 or ALT less than 90 in past 9 months     Rx Refill Note  Requested Prescriptions     Pending Prescriptions Disp Refills    ketorolac (TORADOL) 10 MG tablet 15 tablet 0     Sig: Take 1 tablet by mouth Every 6 (Six) Hours As Needed (Migraine headache) for up to 15 doses.      Last office visit with prescribing clinician: 2/19/2024   Last telemedicine visit with prescribing clinician: Visit date not found   Next office visit with prescribing clinician: 4/5/2024                         Would you like a call back once the refill request has been completed: [] Yes [] No    If the office needs to give you a call back, can they leave a voicemail: [] Yes [] No    Augustus Garcia MA  03/15/24, 08:54 EDT

## 2024-03-17 NOTE — TELEPHONE ENCOUNTER
This is a long term med for this patient. This had been prescribed to her by a previous PCP and I assumed prescribing when pt established care last month.

## 2024-03-18 ENCOUNTER — TELEPHONE (OUTPATIENT)
Dept: INTERNAL MEDICINE | Facility: CLINIC | Age: 29
End: 2024-03-18

## 2024-03-18 NOTE — TELEPHONE ENCOUNTER
Caller: Rey Eugenia    Relationship: Self    Best call back number: [phone]     Caller requesting test results:     What test was performed: BLOOD WORK / URINE TEST    When was the test performed: 03/15/24    Where was the test performed: IN OFFICE    Additional notes: PATIENT CALLING WANTING EITHER NURSE OR PCP TO CALL AND GO OVER HER LAB RESULTS

## 2024-03-19 ENCOUNTER — TELEPHONE (OUTPATIENT)
Dept: INTERNAL MEDICINE | Facility: CLINIC | Age: 29
End: 2024-03-19

## 2024-03-19 ENCOUNTER — TELEPHONE (OUTPATIENT)
Dept: INTERNAL MEDICINE | Facility: CLINIC | Age: 29
End: 2024-03-19
Payer: COMMERCIAL

## 2024-03-19 NOTE — TELEPHONE ENCOUNTER
Called and left a message with Pt to verify if the RX refill request we have for her Toradol is a duplicate or if she has gone through all 15 tablets already.  RX was signed for on 3/15/23.  Advised Pt to call the office and let us know if this is a duplicate request.

## 2024-03-20 ENCOUNTER — TELEPHONE (OUTPATIENT)
Dept: INTERNAL MEDICINE | Facility: CLINIC | Age: 29
End: 2024-03-20
Payer: COMMERCIAL

## 2024-03-20 RX ORDER — LEVOMEFOLATE/ALGAL OIL 15-90.314
1 CAPSULE ORAL DAILY
Qty: 30 CAPSULE | Refills: 0 | Status: SHIPPED | OUTPATIENT
Start: 2024-03-20

## 2024-03-20 NOTE — TELEPHONE ENCOUNTER
Rx Refill Note  Requested Prescriptions     Pending Prescriptions Disp Refills    L-Methylfolate-Algae (L-Methylfolate Forte) 15-90.314 MG capsule capsule 30 capsule 0     Sig: Take 1 capsule by mouth Daily.      Last office visit with prescribing clinician: 6/2/2023   Last telemedicine visit with prescribing clinician: Visit date not found   Next office visit with prescribing clinician: Visit date not found                         Would you like a call back once the refill request has been completed: [] Yes [] No    If the office needs to give you a call back, can they leave a voicemail: [] Yes [] No    Francy Vazquez MA  03/20/24, 09:21 EDT

## 2024-03-21 NOTE — TELEPHONE ENCOUNTER
Returned Pt's call.  She was calling to let us know that her RX request was an accidental duplicate, and she did not need her Toradol refilled at this time.

## 2024-04-05 ENCOUNTER — OFFICE VISIT (OUTPATIENT)
Dept: INTERNAL MEDICINE | Facility: CLINIC | Age: 29
End: 2024-04-05
Payer: COMMERCIAL

## 2024-04-05 VITALS
OXYGEN SATURATION: 96 % | TEMPERATURE: 97.8 F | SYSTOLIC BLOOD PRESSURE: 94 MMHG | HEIGHT: 57 IN | DIASTOLIC BLOOD PRESSURE: 68 MMHG | WEIGHT: 150 LBS | HEART RATE: 90 BPM | BODY MASS INDEX: 32.36 KG/M2

## 2024-04-05 DIAGNOSIS — Z00.00 ENCOUNTER FOR WELLNESS EXAMINATION IN ADULT: Primary | ICD-10-CM

## 2024-04-05 DIAGNOSIS — G25.81 RLS (RESTLESS LEGS SYNDROME): ICD-10-CM

## 2024-04-05 DIAGNOSIS — R76.8 ABNORMAL HEPATITIS SEROLOGY: ICD-10-CM

## 2024-04-05 DIAGNOSIS — E78.00 HYPERCHOLESTEROLEMIA: ICD-10-CM

## 2024-04-05 DIAGNOSIS — R74.01 ELEVATED TRANSAMINASE LEVEL: ICD-10-CM

## 2024-04-05 DIAGNOSIS — F41.1 GAD (GENERALIZED ANXIETY DISORDER): ICD-10-CM

## 2024-04-05 DIAGNOSIS — D50.8 IRON DEFICIENCY ANEMIA SECONDARY TO INADEQUATE DIETARY IRON INTAKE: ICD-10-CM

## 2024-04-05 DIAGNOSIS — E55.9 VITAMIN D DEFICIENCY: ICD-10-CM

## 2024-04-05 DIAGNOSIS — K21.00 GASTROESOPHAGEAL REFLUX DISEASE WITH ESOPHAGITIS WITHOUT HEMORRHAGE: ICD-10-CM

## 2024-04-05 DIAGNOSIS — F33.1 MODERATE EPISODE OF RECURRENT MAJOR DEPRESSIVE DISORDER: ICD-10-CM

## 2024-04-05 DIAGNOSIS — G43.809 OTHER MIGRAINE WITHOUT STATUS MIGRAINOSUS, NOT INTRACTABLE: ICD-10-CM

## 2024-04-05 PROBLEM — F41.9 ANXIETY: Status: RESOLVED | Noted: 2023-02-13 | Resolved: 2024-04-05

## 2024-04-05 RX ORDER — ROPINIROLE 0.5 MG/1
0.5 TABLET, FILM COATED ORAL NIGHTLY
Qty: 30 TABLET | Refills: 0 | Status: SHIPPED | OUTPATIENT
Start: 2024-04-05

## 2024-04-05 RX ORDER — OMEPRAZOLE 40 MG/1
40 CAPSULE, DELAYED RELEASE ORAL
Qty: 90 CAPSULE | Refills: 1 | Status: SHIPPED | OUTPATIENT
Start: 2024-04-05

## 2024-04-05 RX ORDER — PROMETHAZINE HYDROCHLORIDE 25 MG/1
TABLET ORAL
Qty: 20 TABLET | Refills: 5 | Status: SHIPPED | OUTPATIENT
Start: 2024-04-05

## 2024-04-05 RX ORDER — ONDANSETRON 4 MG/1
4 TABLET, ORALLY DISINTEGRATING ORAL EVERY 6 HOURS PRN
Qty: 20 TABLET | Refills: 5 | Status: SHIPPED | OUTPATIENT
Start: 2024-04-05

## 2024-04-05 RX ORDER — ERGOCALCIFEROL 1.25 MG/1
50000 CAPSULE ORAL 2 TIMES WEEKLY
Qty: 30 CAPSULE | Refills: 3 | Status: SHIPPED | OUTPATIENT
Start: 2024-04-08

## 2024-04-05 RX ORDER — FERROUS GLUCONATE 324(37.5)
324 TABLET ORAL
Qty: 90 TABLET | Refills: 1 | Status: SHIPPED | OUTPATIENT
Start: 2024-04-05

## 2024-04-05 NOTE — PROGRESS NOTES
BENJAMIN Bello is a 28 y.o. female presenting for Annual Exam    Her current/chronic health conditions include:  Patient Active Problem List   Diagnosis    Migraines    Iron deficiency anemia    RLS (restless legs syndrome)    VIDAL (generalized anxiety disorder)    Gastroesophageal reflux disease with esophagitis without hemorrhage    Moderate episode of recurrent major depressive disorder    Mixed obsessional thoughts and acts    Vitamin D deficiency       Outpatient Medications Marked as Taking for the 4/5/24 encounter (Office Visit) with Maribel Estes APRN   Medication Sig Dispense Refill    busPIRone (BUSPAR) 5 MG tablet Take 1 tablet by mouth Every Night. May also take 1 tablet 2 (Two) Times a Day As Needed (anxiety). 90 tablet 1    clindamycin (CLINDAGEL) 1 % gel APPLY THIN LAYER TOPICALLY TO AFFECTED AREA ONCE DAILY      ferrous gluconate 324 (37.5 Fe) MG tablet tablet Take 1 tablet by mouth Daily With Breakfast. 90 tablet 1    ketorolac (TORADOL) 10 MG tablet Take 1 tablet by mouth Every 6 (Six) Hours As Needed (Migraine headache) for up to 15 doses. 15 tablet 0    omeprazole (priLOSEC) 40 MG capsule Take 1 capsule by mouth Every Morning Before Breakfast. 90 capsule 1    ondansetron ODT (ZOFRAN-ODT) 4 MG disintegrating tablet Place 1 tablet on the tongue Every 6 (Six) Hours As Needed for Nausea or Vomiting. 20 tablet 5    promethazine (PHENERGAN) 25 MG tablet Take one tablet by mouth every 6 hours as needed for nausea 20 tablet 5    rOPINIRole (REQUIP) 0.5 MG tablet Take 1 tablet by mouth Every Night. Take 1 hour before bedtime. 30 tablet 0    sertraline (ZOLOFT) 100 MG tablet Take 1 tablet by mouth once daily 90 tablet 0    SUMAtriptan (Imitrex) 50 MG tablet Take one tablet at onset of headache. May repeat dose one time in 2 hours if headache not relieved. 9 tablet 3    topiramate (TOPAMAX) 50 MG tablet Take 1 tablet by mouth 2 (Two) Times a Day. 180 tablet 0    [START ON 4/8/2024] vitamin D  (ERGOCALCIFEROL) 1.25 MG (45956 UT) capsule capsule Take 1 capsule by mouth 2 (Two) Times a Week. 30 capsule 3    [DISCONTINUED] ferrous gluconate 324 (37.5 Fe) MG tablet tablet Take 1 tablet by mouth Daily With Breakfast. 30 tablet 5    [DISCONTINUED] omeprazole (priLOSEC) 40 MG capsule Take 1 capsule by mouth Daily.      [DISCONTINUED] ondansetron ODT (ZOFRAN-ODT) 4 MG disintegrating tablet Place 1 tablet on the tongue Every 6 (Six) Hours As Needed for Nausea or Vomiting for up to 15 doses. 15 tablet 0    [DISCONTINUED] promethazine (PHENERGAN) 25 MG tablet Take 1 tablet by mouth Every 6 (Six) Hours As Needed for Nausea or Vomiting for up to 15 doses. 15 tablet 0    [DISCONTINUED] vitamin D (ERGOCALCIFEROL) 1.25 MG (69701 UT) capsule capsule Take 1 capsule by mouth once a week 8 capsule 0       Screenings:  PAP: past due but declines today  Mammogram: n/a  Dexa: n/a  Colon Cancer: n/a  Tob use: former; does not meet criteria for LDCT      ISABEL - takes iron supplement, and has since 2016     GERD - EGD in 6/8/2023 w/ esophagitis     Migraine - since 2010. Nml head CT 11/2022. She takes Topamax QD for approx 1yr. Reports 12 migraines/mo. A/W N/V. Takes Imitrex 8x/mo. This generally works well but occas she will need to repeat. She had previously been prescribed Toradol to take in addition to Imitrex and this combo worked better. +visual aura.     RLS - well controlled w/ Requip     Mental Health - dx include VIDAL, MDD, and OCD. Her medication regimen includes Zoloft, Buspar, and L-methylfolate. Reports has been well controlled on this regimen for about the last year. Is not in counseling d/t wait time in scheduling new client appt.     Vit D def - on supp      The following portions of the patient's history were reviewed and updated as appropriate: allergies, current medications, problem list, past medical history, past family history, and past social history.        OBJECTIVE    Vitals:    04/05/24 0844 04/05/24 0940  "  BP: 94/68    BP Location: Left arm    Patient Position: Sitting    Cuff Size: Adult    Pulse: (!) 134 90   Temp: 97.8 °F (36.6 °C)    TempSrc: Infrared    SpO2: 96%    Weight: 68 kg (150 lb)    Height: 144.8 cm (57.01\")        BP Readings from Last 3 Encounters:   04/05/24 94/68   02/19/24 116/78   11/03/23 124/92        Wt Readings from Last 3 Encounters:   04/05/24 68 kg (150 lb)   02/19/24 69.2 kg (152 lb 9.6 oz)   11/03/23 65.8 kg (145 lb)        Body mass index is 32.45 kg/m².  Nursing notes and vital signs reviewed.      Physical Exam  Constitutional:       General: She is not in acute distress.     Appearance: Normal appearance. She is well-developed.   HENT:      Head: Normocephalic.      Right Ear: Hearing, tympanic membrane, ear canal and external ear normal.      Left Ear: Hearing, tympanic membrane, ear canal and external ear normal.      Nose: Nose normal. No mucosal edema or rhinorrhea.      Mouth/Throat:      Mouth: Mucous membranes are moist.      Pharynx: Oropharynx is clear. Uvula midline.   Eyes:      General: Lids are normal.      Extraocular Movements: Extraocular movements intact.      Conjunctiva/sclera: Conjunctivae normal.      Pupils: Pupils are equal, round, and reactive to light.   Neck:      Thyroid: No thyroid mass or thyromegaly.   Cardiovascular:      Rate and Rhythm: Regular rhythm.      Pulses: Normal pulses.      Heart sounds: S1 normal and S2 normal. No murmur heard.     No friction rub. No gallop.   Pulmonary:      Effort: Pulmonary effort is normal.      Breath sounds: Normal breath sounds. No wheezing, rhonchi or rales.   Abdominal:      General: Bowel sounds are normal.      Palpations: Abdomen is soft.      Tenderness: There is no abdominal tenderness. There is no guarding.      Hernia: No hernia is present.   Musculoskeletal:         General: No deformity. Normal range of motion.      Cervical back: Normal range of motion and neck supple.   Lymphadenopathy:      Cervical: " No cervical adenopathy.   Skin:     General: Skin is warm and dry.      Findings: No lesion or rash.   Neurological:      General: No focal deficit present.      Mental Status: She is alert and oriented to person, place, and time.      Cranial Nerves: No cranial nerve deficit.      Sensory: No sensory deficit.      Motor: Motor function is intact.      Coordination: Coordination is intact.      Gait: Gait normal.      Deep Tendon Reflexes: Reflexes are normal and symmetric.   Psychiatric:         Attention and Perception: She is attentive.         Mood and Affect: Mood and affect normal.         Speech: Speech normal.         Behavior: Behavior normal.         Thought Content: Thought content normal.         Recent Results (from the past 672 hour(s))   CBC (No Diff)    Collection Time: 03/15/24  9:36 AM    Specimen: Blood   Result Value Ref Range    WBC 7.9 3.4 - 10.8 x10E3/uL    RBC 5.01 3.77 - 5.28 x10E6/uL    Hemoglobin 14.9 11.1 - 15.9 g/dL    Hematocrit 46.0 34.0 - 46.6 %    MCV 92 79 - 97 fL    MCH 29.7 26.6 - 33.0 pg    MCHC 32.4 31.5 - 35.7 g/dL    RDW 13.3 11.7 - 15.4 %    Platelets 209 150 - 450 x10E3/uL   Comprehensive Metabolic Panel    Collection Time: 03/15/24  9:36 AM    Specimen: Blood   Result Value Ref Range    Glucose 75 70 - 99 mg/dL    BUN 6 6 - 20 mg/dL    Creatinine 0.98 0.57 - 1.00 mg/dL    EGFR Result 81 >59 mL/min/1.73    BUN/Creatinine Ratio 6 (L) 9 - 23    Sodium 138 134 - 144 mmol/L    Potassium 4.2 3.5 - 5.2 mmol/L    Chloride 105 96 - 106 mmol/L    Total CO2 19 (L) 20 - 29 mmol/L    Calcium 9.8 8.7 - 10.2 mg/dL    Total Protein 7.8 6.0 - 8.5 g/dL    Albumin 4.7 4.0 - 5.0 g/dL    Globulin 3.1 1.5 - 4.5 g/dL    A/G Ratio 1.5 1.2 - 2.2    Total Bilirubin 0.4 0.0 - 1.2 mg/dL    Alkaline Phosphatase 127 (H) 44 - 121 IU/L    AST (SGOT) 42 (H) 0 - 40 IU/L    ALT (SGPT) 166 (H) 0 - 32 IU/L   Ferritin    Collection Time: 03/15/24  9:36 AM    Specimen: Blood   Result Value Ref Range    Ferritin  115 15 - 150 ng/mL   Folate    Collection Time: 03/15/24  9:36 AM    Specimen: Blood   Result Value Ref Range    Folate >20.0 >3.0 ng/mL   Iron Profile    Collection Time: 03/15/24  9:36 AM    Specimen: Blood   Result Value Ref Range    TIBC 347 250 - 450 ug/dL    UIBC 276 131 - 425 ug/dL    Iron 71 27 - 159 ug/dL    Iron Saturation 20 15 - 55 %   Lipid Panel With / Chol / HDL Ratio    Collection Time: 03/15/24  9:36 AM    Specimen: Blood   Result Value Ref Range    Total Cholesterol 288 (H) 100 - 199 mg/dL    Triglycerides 185 (H) 0 - 149 mg/dL    HDL Cholesterol 48 >39 mg/dL    VLDL Cholesterol Doug 35 5 - 40 mg/dL    LDL Chol Calc (NIH) 205 (H) 0 - 99 mg/dL    Comment Comment     Chol/HDL Ratio 6.0 (H) 0.0 - 4.4 ratio   TSH    Collection Time: 03/15/24  9:36 AM    Specimen: Blood   Result Value Ref Range    TSH 3.300 0.450 - 4.500 uIU/mL   Vitamin B12    Collection Time: 03/15/24  9:36 AM    Specimen: Blood   Result Value Ref Range    Vitamin B-12 806 232 - 1,245 pg/mL   Vitamin D,25-Hydroxy    Collection Time: 03/15/24  9:36 AM    Specimen: Blood   Result Value Ref Range    25 Hydroxy, Vitamin D 29.8 (L) 30.0 - 100.0 ng/mL   Urinalysis With Microscopic - Urine, Clean Catch    Collection Time: 03/15/24  9:36 AM    Specimen: Urine, Clean Catch   Result Value Ref Range    Specific Gravity, UA 1.015 1.005 - 1.030    pH, UA 8.0 (H) 5.0 - 7.5    Color, UA Yellow Yellow    Appearance, UA Cloudy (A) Clear    Leukocytes, UA Trace (A) Negative    Protein 1+ (A) Negative/Trace    Glucose, UA Negative Negative    Ketones Negative Negative    Blood, UA Negative Negative    Bilirubin, UA Negative Negative    Urobilinogen, UA 0.2 0.2 - 1.0 mg/dL    Nitrite, UA Negative Negative    Microscopic Examination See below:    Hepatitis C Antibody    Collection Time: 03/15/24  9:36 AM    Specimen: Blood   Result Value Ref Range    Hep C Virus Ab Equivocal (A) Non Reactive   Microscopic Examination -    Collection Time: 03/15/24  9:36 AM    Result Value Ref Range    WBC, UA None seen 0 - 5 /hpf    RBC, UA None seen 0 - 2 /hpf    Epithelial Cells (non renal) >10 (A) 0 - 10 /hpf    Casts None seen None seen /lpf    Bacteria, UA Few None seen/Few         ASSESSMENT AND PLAN    Diagnoses and all orders for this visit:    1. Encounter for wellness examination in adult (Primary)    2. Vitamin D deficiency  -     vitamin D (ERGOCALCIFEROL) 1.25 MG (48395 UT) capsule capsule; Take 1 capsule by mouth 2 (Two) Times a Week.  Dispense: 30 capsule; Refill: 3    3. Elevated transaminase level  -     Hepatitis panel, acute  -     HCV RT-PCR,Quant(Non-Graph)  -     US Liver; Future    4. Hypercholesterolemia   - Need to avoid statin until we have resolved issue r/t liver enzymes.   - Patient counseled regarding therapeutic lifestyle changes including improved nutrition, increased exercise, and weight loss.    5. Abnormal hepatitis serology  -     Hepatitis panel, acute  -     HCV RT-PCR,Quant(Non-Graph)  -     US Liver; Future    6. Iron deficiency anemia secondary to inadequate dietary iron intake  -     ferrous gluconate 324 (37.5 Fe) MG tablet tablet; Take 1 tablet by mouth Daily With Breakfast.  Dispense: 90 tablet; Refill: 1    7. VIDAL (generalized anxiety disorder)    8. Moderate episode of recurrent major depressive disorder    9. Other migraine without status migrainosus, not intractable  -     ondansetron ODT (ZOFRAN-ODT) 4 MG disintegrating tablet; Place 1 tablet on the tongue Every 6 (Six) Hours As Needed for Nausea or Vomiting.  Dispense: 20 tablet; Refill: 5  -     promethazine (PHENERGAN) 25 MG tablet; Take one tablet by mouth every 6 hours as needed for nausea  Dispense: 20 tablet; Refill: 5    10. RLS (restless legs syndrome)    11. Gastroesophageal reflux disease with esophagitis without hemorrhage  -     omeprazole (priLOSEC) 40 MG capsule; Take 1 capsule by mouth Every Morning Before Breakfast.  Dispense: 90 capsule; Refill: 1        Preventative  counseling completed including relevant screenings, appropriate vaccinations, healthy nutrition, and appropriate physical activity. Age-appropriate Screening & Interventions recommended by USPSTF were reviewed with the patient, and Health Maintenance was updated in Epic.  BMI is >= 30 and <35. (Class 1 Obesity). The following options were offered after discussion;: nutrition counseling/recommendations               Medications, including side effects, were discussed with the patient. Patient verbalized understanding.  The plan of care was discussed. All questions were answered. Patient verbalized understanding.        Return in about 1 month (around 5/5/2024) for Annual Physical with PAP.

## 2024-04-06 LAB
HAV IGM SERPL QL IA: NEGATIVE
HBV CORE IGM SERPL QL IA: NEGATIVE
HBV SURFACE AG SERPL QL IA: NEGATIVE
HCV AB SERPL QL IA: NORMAL
HCV IGG SERPL QL IA: NON REACTIVE
HCV RNA SERPL NAA+PROBE-ACNC: NORMAL IU/ML
TEST INFORMATION: NORMAL

## 2024-04-07 DIAGNOSIS — D50.8 IRON DEFICIENCY ANEMIA SECONDARY TO INADEQUATE DIETARY IRON INTAKE: ICD-10-CM

## 2024-04-07 DIAGNOSIS — G43.111 INTRACTABLE MIGRAINE WITH AURA WITH STATUS MIGRAINOSUS: ICD-10-CM

## 2024-04-08 RX ORDER — FERROUS GLUCONATE 324(37.5)
324 TABLET ORAL
Qty: 90 TABLET | Refills: 1 | OUTPATIENT
Start: 2024-04-08

## 2024-04-08 RX ORDER — SUMATRIPTAN 50 MG/1
TABLET, FILM COATED ORAL
Qty: 9 TABLET | Refills: 3 | Status: SHIPPED | OUTPATIENT
Start: 2024-04-08

## 2024-04-11 DIAGNOSIS — D50.8 IRON DEFICIENCY ANEMIA SECONDARY TO INADEQUATE DIETARY IRON INTAKE: ICD-10-CM

## 2024-04-11 RX ORDER — FERROUS GLUCONATE 324(37.5)
324 TABLET ORAL
Qty: 90 TABLET | Refills: 1 | Status: SHIPPED | OUTPATIENT
Start: 2024-04-11

## 2024-04-11 NOTE — TELEPHONE ENCOUNTER
Caller: Eugenia Le    Relationship: Self    Best call back number: [phone]    Requested Prescriptions:   Requested Prescriptions     Pending Prescriptions Disp Refills    ferrous gluconate 324 (37.5 Fe) MG tablet tablet 90 tablet 1     Sig: Take 1 tablet by mouth Daily With Breakfast.        Pharmacy where request should be sent: [facility] - [facility] [place] - [address] [phone] [facility] [phone] FX     Last office visit with prescribing clinician: 4/5/2024   Last telemedicine visit with prescribing clinician: Visit date not found   Next office visit with prescribing clinician: 5/7/2024     Additional details provided by patient: LESS THAN 3 DAYS SUPPLY.  THE FIRST PRESCRIPTION DID NOT GO THROUGH.  [facility] SAID THEY NEVER RECEIVED IT.     Does the patient have less than a 3 day supply:  [x] Yes  [] No    Would you like a call back once the refill request has been completed: [] Yes [x] No    If the office needs to give you a call back, can they leave a voicemail: [] Yes [x] No    Julianne Polo   04/11/24 10:00 EDT

## 2024-04-11 NOTE — TELEPHONE ENCOUNTER
Additional details provided by patient: LESS THAN 3 DAYS SUPPLY.  THE FIRST PRESCRIPTION DID NOT GO THROUGH.  [facility] SAID THEY NEVER RECEIVED IT.     Please resend rx    Rx Refill Note  Requested Prescriptions     Pending Prescriptions Disp Refills    ferrous gluconate 324 (37.5 Fe) MG tablet tablet 90 tablet 1     Sig: Take 1 tablet by mouth Daily With Breakfast.      Last office visit with prescribing clinician: 4/5/2024   Last telemedicine visit with prescribing clinician: Visit date not found   Next office visit with prescribing clinician: 5/7/2024                         Would you like a call back once the refill request has been completed: [] Yes [] No    If the office needs to give you a call back, can they leave a voicemail: [] Yes [] No    Nicolle Gibson MA  04/11/24, 10:26 EDT

## 2024-04-13 DIAGNOSIS — G43.109 MIGRAINE WITH AURA AND WITHOUT STATUS MIGRAINOSUS, NOT INTRACTABLE: ICD-10-CM

## 2024-04-13 DIAGNOSIS — D50.8 IRON DEFICIENCY ANEMIA SECONDARY TO INADEQUATE DIETARY IRON INTAKE: ICD-10-CM

## 2024-04-15 RX ORDER — KETOROLAC TROMETHAMINE 10 MG/1
10 TABLET, FILM COATED ORAL EVERY 6 HOURS PRN
Qty: 15 TABLET | Refills: 0 | Status: SHIPPED | OUTPATIENT
Start: 2024-04-15

## 2024-04-15 RX ORDER — FERROUS GLUCONATE 324(37.5)
324 TABLET ORAL
Qty: 90 TABLET | Refills: 1 | OUTPATIENT
Start: 2024-04-15

## 2024-04-15 NOTE — TELEPHONE ENCOUNTER
Rx Refill Note  Requested Prescriptions     Pending Prescriptions Disp Refills    ketorolac (TORADOL) 10 MG tablet 15 tablet 0     Sig: Take 1 tablet by mouth Every 6 (Six) Hours As Needed (Migraine headache) for up to 15 doses.      Last office visit with prescribing clinician: Visit date not found   Last telemedicine visit with prescribing clinician: Visit date not found   Next office visit with prescribing clinician: Visit date not found                         Would you like a call back once the refill request has been completed: [] Yes [] No    If the office needs to give you a call back, can they leave a voicemail: [] Yes [] No    Augustus Garcia MA  04/15/24, 11:26 EDT

## 2024-05-02 DIAGNOSIS — G43.111 INTRACTABLE MIGRAINE WITH AURA WITH STATUS MIGRAINOSUS: ICD-10-CM

## 2024-05-02 DIAGNOSIS — G43.109 MIGRAINE WITH AURA AND WITHOUT STATUS MIGRAINOSUS, NOT INTRACTABLE: ICD-10-CM

## 2024-05-02 DIAGNOSIS — F41.9 ANXIETY: ICD-10-CM

## 2024-05-03 DIAGNOSIS — G25.81 RLS (RESTLESS LEGS SYNDROME): ICD-10-CM

## 2024-05-03 RX ORDER — BUSPIRONE HYDROCHLORIDE 5 MG/1
TABLET ORAL
Qty: 90 TABLET | Refills: 1 | Status: SHIPPED | OUTPATIENT
Start: 2024-05-03

## 2024-05-03 RX ORDER — ROPINIROLE 0.5 MG/1
0.5 TABLET, FILM COATED ORAL NIGHTLY
Qty: 30 TABLET | Refills: 0 | Status: SHIPPED | OUTPATIENT
Start: 2024-05-03

## 2024-05-03 RX ORDER — SUMATRIPTAN 50 MG/1
TABLET, FILM COATED ORAL
Qty: 9 TABLET | Refills: 3 | OUTPATIENT
Start: 2024-05-03

## 2024-05-03 NOTE — TELEPHONE ENCOUNTER
Rx Refill Note  Requested Prescriptions     Pending Prescriptions Disp Refills    rOPINIRole (REQUIP) 0.5 MG tablet 30 tablet 0     Sig: Take 1 tablet by mouth Every Night. 1 hour before bedtime      Last office visit with prescribing clinician: Visit date not found   Last telemedicine visit with prescribing clinician: Visit date not found   Next office visit with prescribing clinician: Visit date not found                         Would you like a call back once the refill request has been completed: [] Yes [] No    If the office needs to give you a call back, can they leave a voicemail: [] Yes [] No    Augustus Garcia MA  05/03/24, 13:52 EDT

## 2024-05-07 ENCOUNTER — OFFICE VISIT (OUTPATIENT)
Dept: INTERNAL MEDICINE | Facility: CLINIC | Age: 29
End: 2024-05-07
Payer: COMMERCIAL

## 2024-05-07 ENCOUNTER — HOSPITAL ENCOUNTER (OUTPATIENT)
Dept: ULTRASOUND IMAGING | Facility: HOSPITAL | Age: 29
Discharge: HOME | End: 2024-05-07
Admitting: NURSE PRACTITIONER
Payer: COMMERCIAL

## 2024-05-07 VITALS
WEIGHT: 140.6 LBS | HEIGHT: 57 IN | TEMPERATURE: 98.1 F | HEART RATE: 114 BPM | SYSTOLIC BLOOD PRESSURE: 122 MMHG | DIASTOLIC BLOOD PRESSURE: 86 MMHG | OXYGEN SATURATION: 100 % | BODY MASS INDEX: 30.34 KG/M2

## 2024-05-07 DIAGNOSIS — R76.8 ABNORMAL HEPATITIS SEROLOGY: ICD-10-CM

## 2024-05-07 DIAGNOSIS — R74.01 ELEVATED TRANSAMINASE LEVEL: ICD-10-CM

## 2024-05-07 DIAGNOSIS — Z53.21 PATIENT LEFT WITHOUT BEING SEEN: Primary | ICD-10-CM

## 2024-05-07 PROCEDURE — 76705 ECHO EXAM OF ABDOMEN: CPT

## 2024-05-07 RX ORDER — SUMATRIPTAN 50 MG/1
TABLET, FILM COATED ORAL
Qty: 9 TABLET | Refills: 3 | Status: CANCELLED | OUTPATIENT
Start: 2024-05-07

## 2024-05-07 RX ORDER — KETOROLAC TROMETHAMINE 10 MG/1
10 TABLET, FILM COATED ORAL EVERY 6 HOURS PRN
Qty: 15 TABLET | Refills: 5 | Status: SHIPPED | OUTPATIENT
Start: 2024-05-07

## 2024-05-09 ENCOUNTER — TELEPHONE (OUTPATIENT)
Dept: INTERNAL MEDICINE | Facility: CLINIC | Age: 29
End: 2024-05-09

## 2024-05-09 NOTE — TELEPHONE ENCOUNTER
UNABLE TO WARM TRANSFER     Caller: Eugenia Le    Relationship to patient: Self    Best call back number: [phone]     Patient is needing: MORE INFORMATION REGARDING HER ULTRASOUND    PATIENT WOULD LIKE A CALLBACK

## 2024-05-13 DIAGNOSIS — G43.111 INTRACTABLE MIGRAINE WITH AURA WITH STATUS MIGRAINOSUS: ICD-10-CM

## 2024-05-13 DIAGNOSIS — G43.109 MIGRAINE WITH AURA AND WITHOUT STATUS MIGRAINOSUS, NOT INTRACTABLE: ICD-10-CM

## 2024-05-13 DIAGNOSIS — G43.809 OTHER MIGRAINE WITHOUT STATUS MIGRAINOSUS, NOT INTRACTABLE: ICD-10-CM

## 2024-05-13 RX ORDER — SUMATRIPTAN 50 MG/1
TABLET, FILM COATED ORAL
Qty: 9 TABLET | Refills: 3 | Status: SHIPPED | OUTPATIENT
Start: 2024-05-13

## 2024-05-13 RX ORDER — TOPIRAMATE 50 MG/1
50 TABLET, FILM COATED ORAL 2 TIMES DAILY
Qty: 180 TABLET | Refills: 0 | Status: SHIPPED | OUTPATIENT
Start: 2024-05-13

## 2024-05-13 RX ORDER — PROMETHAZINE HYDROCHLORIDE 25 MG/1
TABLET ORAL
Qty: 20 TABLET | Refills: 5 | Status: SHIPPED | OUTPATIENT
Start: 2024-05-13

## 2024-05-13 NOTE — TELEPHONE ENCOUNTER
Pt requesting sumatriptan early, refused 6 days ago.     Rx Refill Note  Requested Prescriptions     Pending Prescriptions Disp Refills    topiramate (TOPAMAX) 50 MG tablet 180 tablet 0     Sig: Take 1 tablet by mouth 2 (Two) Times a Day.    SUMAtriptan (Imitrex) 50 MG tablet 9 tablet 3     Sig: Take one tablet at onset of headache. May repeat dose one time in 2 hours if headache not relieved.    promethazine (PHENERGAN) 25 MG tablet 20 tablet 5     Sig: Take one tablet by mouth every 6 hours as needed for nausea      Last office visit with prescribing clinician: 5/7/2024   Last telemedicine visit with prescribing clinician: Visit date not found   Next office visit with prescribing clinician: Visit date not found                         Would you like a call back once the refill request has been completed: [] Yes [] No    If the office needs to give you a call back, can they leave a voicemail: [] Yes [] No    Augustus Garcia MA  05/13/24, 13:14 EDT

## 2024-05-13 NOTE — TELEPHONE ENCOUNTER
Caller: Eugenia Le    Relationship: Self    Best call back number: [phone]     Requested Prescriptions:   Requested Prescriptions     Pending Prescriptions Disp Refills    topiramate (TOPAMAX) 50 MG tablet 180 tablet 0     Sig: Take 1 tablet by mouth 2 (Two) Times a Day.    SUMAtriptan (Imitrex) 50 MG tablet 9 tablet 3     Sig: Take one tablet at onset of headache. May repeat dose one time in 2 hours if headache not relieved.    promethazine (PHENERGAN) 25 MG tablet 20 tablet 5     Sig: Take one tablet by mouth every 6 hours as needed for nausea        Pharmacy where request should be sent: [facility] - [place], [place] - [address] [phone] [facility] [phone] FX     Last office visit with prescribing clinician: 5/7/2024   Last telemedicine visit with prescribing clinician: Visit date not found   Next office visit with prescribing clinician: Visit date not found     Additional details provided by patient:     PATIENT IS NEARLY OUT OF ALL MEDS.     PATIENT STATES SHE HAD LABS DONE ONE MONTH AGO AND WANTS TO KNOW IF PROVIDER NEEDS THEM RECHECKED. PLEASE ADVISE.       Does the patient have less than a 3 day supply:  [x] Yes  [] No    Would you like a call back once the refill request has been completed: [] Yes [x] No    If the office needs to give you a call back, can they leave a voicemail: [] Yes [x] No    Julianne Roberson Rep   05/13/24 13:07 EDT

## 2024-05-26 DIAGNOSIS — K21.00 GASTROESOPHAGEAL REFLUX DISEASE WITH ESOPHAGITIS WITHOUT HEMORRHAGE: ICD-10-CM

## 2024-05-26 DIAGNOSIS — D50.8 IRON DEFICIENCY ANEMIA SECONDARY TO INADEQUATE DIETARY IRON INTAKE: ICD-10-CM

## 2024-05-27 DIAGNOSIS — G43.809 OTHER MIGRAINE WITHOUT STATUS MIGRAINOSUS, NOT INTRACTABLE: ICD-10-CM

## 2024-05-28 RX ORDER — ONDANSETRON 4 MG/1
4 TABLET, ORALLY DISINTEGRATING ORAL EVERY 6 HOURS PRN
Qty: 20 TABLET | Refills: 5 | OUTPATIENT
Start: 2024-05-28

## 2024-05-28 RX ORDER — FERROUS GLUCONATE 324(37.5)
324 TABLET ORAL
Qty: 90 TABLET | Refills: 1 | OUTPATIENT
Start: 2024-05-28

## 2024-05-28 RX ORDER — CLINDAMYCIN PHOSPHATE 10 MG/G
GEL TOPICAL
OUTPATIENT
Start: 2024-05-28

## 2024-05-28 RX ORDER — OMEPRAZOLE 40 MG/1
40 CAPSULE, DELAYED RELEASE ORAL
Qty: 90 CAPSULE | Refills: 1 | OUTPATIENT
Start: 2024-05-28

## 2024-05-28 NOTE — TELEPHONE ENCOUNTER
"Subjective   CC neck pain  Sourav Proctor is a 66 y.o. male with cervical radiculitis here for repeat cervical SUZANNA.  No anticoagulation    Pain Assessment   Location of Pain: Lower Back, R Hip, L Hip, neck pain, joint  Description of Pain: Dull/Aching, Throbbing, Stabbing  Previous Pain Rating :2  Current Pain Ratin  Aggravating Factors: Activity  Alleviating Factors: Rest, Medication  Pain onset over 12 weeks ago  Pain interferes with ADL's  [place] back pain disability scale scanned in chart     The following portions of the patient's history were reviewed and updated as appropriate: allergies, current medications, past family history, past medical history, past social history, past surgical history and problem list.      Review of Systems  As in HPI  Objective   Physical Exam  Vitals reviewed.   Constitutional:       General: He is not in acute distress.  Pulmonary:      Effort: Pulmonary effort is normal.     /68 (BP Location: Left arm, Patient Position: Sitting)   Pulse 75   Temp 97.7 °F (36.5 °C) (Skin)   Resp 16   Ht 170.2 cm (67\")   Wt 75.3 kg (166 lb)   SpO2 99%   BMI 26.00 kg/m²     Assessment & Plan    underwent repeat cervical SUZANNA    RTC 4-6 weeks or as needed for repeat    DATE OF PROCEDURE:  10/4/2023    PREOPERATIVE DIAGNOSIS:Cervical radiculitis    POSTOPERATIVE DIAGNOSIS: same    PROCEDURE PERFORMED:  cervical Epidural Steroid Injection    The patient presents with a history of   cervical degenerative disc disease  with  radiculitis. The patient presents today for a  cervical  epidural steroid injection at level C6/7. The patient understands the risks and benefits of the procedure and wishes to proceed.  The patient was seen in the preoperative area.  Patient's consent was obtained and updated.  Vitals were taken.  Patient was then brought to the procedure suite and placed in a prone position. The appropriate anatomic area was widely prepped with Chloraprep and draped in a sterile " Pt LWBS from last appt. Needs to be rescheduled.   fashion. Noninvasive monitoring per routine anesthesia protocol was placed.  Under fluoroscopic guidance using  AP view a 20 gauge styleted tuohy needle was passed through skin anesthetized with 1% Lidocaine without epinephrine.  The needle was advanced using the continuous loss of resistance to saline technique into the C6 epidural space. Needle tip placement in the epidural space was confirmed by loss of resistance and injection of 1 mL of  preservative free contrast.  Following this 7 mL of a solution containing  1 mL of 40 mg Depo-Medrol and 7 mL of preservative-free saline was carefully administered in the epidural space. Epidurogram following injection demonstrated dye spread as high as C4 and as low as T1. A sterile dressing was placed over the puncture site.    The patient tolerated the procedure with no complications . They were then brought to the post procedure area where they recovered nicely.

## 2024-05-31 DIAGNOSIS — G25.81 RLS (RESTLESS LEGS SYNDROME): ICD-10-CM

## 2024-05-31 RX ORDER — ROPINIROLE 0.5 MG/1
TABLET, FILM COATED ORAL
Qty: 30 TABLET | Refills: 0 | Status: SHIPPED | OUTPATIENT
Start: 2024-05-31

## 2024-06-20 ENCOUNTER — TELEPHONE (OUTPATIENT)
Dept: INTERNAL MEDICINE | Facility: CLINIC | Age: 29
End: 2024-06-20
Payer: COMMERCIAL

## 2024-06-25 DIAGNOSIS — G25.81 RLS (RESTLESS LEGS SYNDROME): ICD-10-CM

## 2024-06-25 RX ORDER — ROPINIROLE 0.5 MG/1
TABLET, FILM COATED ORAL
Qty: 30 TABLET | Refills: 0 | Status: SHIPPED | OUTPATIENT
Start: 2024-06-25

## 2024-06-25 NOTE — TELEPHONE ENCOUNTER
Rx Refill Note  Requested Prescriptions     Pending Prescriptions Disp Refills    rOPINIRole (REQUIP) 0.5 MG tablet [Pharmacy Med Name: rOPINIRole HCl 0.5 MG Oral Tablet] 30 tablet 0     Sig: TAKE 1 TABLET BY MOUTH IN THE EVENING 1 HOUR BEFORE BEDTIME      Last office visit with prescribing clinician: Visit date not found   Last telemedicine visit with prescribing clinician: Visit date not found   Next office visit with prescribing clinician: Visit date not found                         Would you like a call back once the refill request has been completed: [] Yes [] No    If the office needs to give you a call back, can they leave a voicemail: [] Yes [] No    Augustus Garcia MA  06/25/24, 14:22 EDT

## 2024-07-11 ENCOUNTER — OFFICE VISIT (OUTPATIENT)
Dept: INTERNAL MEDICINE | Facility: CLINIC | Age: 29
End: 2024-07-11
Payer: COMMERCIAL

## 2024-07-11 VITALS
HEART RATE: 92 BPM | TEMPERATURE: 98.2 F | HEIGHT: 57 IN | SYSTOLIC BLOOD PRESSURE: 110 MMHG | DIASTOLIC BLOOD PRESSURE: 80 MMHG | BODY MASS INDEX: 29.17 KG/M2 | WEIGHT: 135.2 LBS | OXYGEN SATURATION: 100 %

## 2024-07-11 DIAGNOSIS — G43.109 MIGRAINE WITH AURA AND WITHOUT STATUS MIGRAINOSUS, NOT INTRACTABLE: ICD-10-CM

## 2024-07-11 DIAGNOSIS — E78.00 HYPERCHOLESTEROLEMIA: ICD-10-CM

## 2024-07-11 DIAGNOSIS — E55.9 VITAMIN D DEFICIENCY: ICD-10-CM

## 2024-07-11 DIAGNOSIS — L73.2 HIDRADENITIS SUPPURATIVA: Primary | ICD-10-CM

## 2024-07-11 DIAGNOSIS — D50.8 IRON DEFICIENCY ANEMIA SECONDARY TO INADEQUATE DIETARY IRON INTAKE: ICD-10-CM

## 2024-07-11 PROCEDURE — 1159F MED LIST DOCD IN RCRD: CPT | Performed by: NURSE PRACTITIONER

## 2024-07-11 PROCEDURE — 99214 OFFICE O/P EST MOD 30 MIN: CPT | Performed by: NURSE PRACTITIONER

## 2024-07-11 PROCEDURE — 1160F RVW MEDS BY RX/DR IN RCRD: CPT | Performed by: NURSE PRACTITIONER

## 2024-07-11 PROCEDURE — 1125F AMNT PAIN NOTED PAIN PRSNT: CPT | Performed by: NURSE PRACTITIONER

## 2024-07-11 RX ORDER — FERROUS GLUCONATE 324(37.5)
324 TABLET ORAL
Qty: 90 TABLET | Refills: 1 | Status: SHIPPED | OUTPATIENT
Start: 2024-07-11

## 2024-07-11 RX ORDER — TOPIRAMATE 50 MG/1
50 TABLET, FILM COATED ORAL 2 TIMES DAILY
Qty: 180 TABLET | Refills: 1 | Status: SHIPPED | OUTPATIENT
Start: 2024-07-11

## 2024-07-11 NOTE — PROGRESS NOTES
Subjective   Eugenia Le is a 28 y.o. female presenting today for follow up of   Chief Complaint   Patient presents with    Med Refill       Has seen dermatology in the past for HS. Prescribed Clindamycin w/o relief. This particularly affects the groin and thigh area.    ISABEL - takes iron supplement, and has since 2016     Vit D def - on supp     Previous high cholesterol. Had been holding off statin pending eval of transaminitis which is resolved.    Migraine - since 2010. Nml head CT 11/2022. She takes Topamax QD for approx 1yr. Reports 12 migraines/mo. A/W N/V. Takes Imitrex 8x/mo. This generally works well but occas she will need to repeat. She had previously been prescribed Toradol to take in addition to Imitrex and this combo worked better. +visual aura.       Outpatient Medications Marked as Taking for the 7/11/24 encounter (Office Visit) with Maribel Estes APRN   Medication Sig Dispense Refill    busPIRone (BUSPAR) 5 MG tablet Take 1 tablet by mouth Every Night. May also take 1 tablet 2 (Two) Times a Day As Needed (anxiety). 90 tablet 1    clindamycin (CLINDAGEL) 1 % gel APPLY THIN LAYER TOPICALLY TO AFFECTED AREA ONCE DAILY      ferrous gluconate 324 (37.5 Fe) MG tablet tablet Take 1 tablet by mouth Daily With Breakfast. 90 tablet 1    ketorolac (TORADOL) 10 MG tablet Take 1 tablet by mouth Every 6 (Six) Hours As Needed (Migraine headache). 15 tablet 5    omeprazole (priLOSEC) 40 MG capsule Take 1 capsule by mouth Every Morning Before Breakfast. 90 capsule 1    ondansetron ODT (ZOFRAN-ODT) 4 MG disintegrating tablet Place 1 tablet on the tongue Every 6 (Six) Hours As Needed for Nausea or Vomiting. 20 tablet 5    promethazine (PHENERGAN) 25 MG tablet Take one tablet by mouth every 6 hours as needed for nausea 20 tablet 5    rOPINIRole (REQUIP) 0.5 MG tablet TAKE 1 TABLET BY MOUTH IN THE EVENING 1 HOUR BEFORE BEDTIME 30 tablet 0    sertraline (ZOLOFT) 100 MG tablet Take 1 tablet by mouth once daily 90  "tablet 0    SUMAtriptan (Imitrex) 50 MG tablet Take one tablet at onset of headache. May repeat dose one time in 2 hours if headache not relieved. 9 tablet 3    topiramate (TOPAMAX) 50 MG tablet Take 1 tablet by mouth 2 (Two) Times a Day. 180 tablet 1    vitamin D (ERGOCALCIFEROL) 1.25 MG (26130 UT) capsule capsule Take 1 capsule by mouth 2 (Two) Times a Week. 30 capsule 3    [DISCONTINUED] topiramate (TOPAMAX) 50 MG tablet Take 1 tablet by mouth 2 (Two) Times a Day. 180 tablet 0         The following portions of the patient's history were reviewed and updated as appropriate: allergies, current medications, past family history, past medical history, past social history, past surgical history and problem list.        Objective   Vitals:    07/11/24 1240   BP: 110/80   BP Location: Left arm   Patient Position: Sitting   Cuff Size: Adult   Pulse: 92   Temp: 98.2 °F (36.8 °C)   TempSrc: Infrared   SpO2: 100%   Weight: 61.3 kg (135 lb 3.2 oz)   Height: 144.8 cm (57\")       BP Readings from Last 3 Encounters:   07/11/24 110/80   05/07/24 122/86   04/05/24 94/68        Wt Readings from Last 3 Encounters:   07/11/24 61.3 kg (135 lb 3.2 oz)   05/07/24 63.8 kg (140 lb 9.6 oz)   04/05/24 68 kg (150 lb)        Body mass index is 29.26 kg/m².  Nursing notes and vitals reviewed.    Physical Exam  Constitutional:       General: She is not in acute distress.     Appearance: She is well-developed.   Pulmonary:      Effort: Pulmonary effort is normal.   Skin:     Comments: Cystic lesions and scarring scattered throughout the groin area   Neurological:      Mental Status: She is alert.   Psychiatric:         Attention and Perception: She is attentive.         Speech: Speech normal.         Recent Results (from the past 3360 hour(s))   CBC (No Diff)    Collection Time: 03/15/24  9:36 AM    Specimen: Blood   Result Value Ref Range    WBC 7.9 3.4 - 10.8 x10E3/uL    RBC 5.01 3.77 - 5.28 x10E6/uL    Hemoglobin 14.9 11.1 - 15.9 g/dL    " Hematocrit 46.0 34.0 - 46.6 %    MCV 92 79 - 97 fL    MCH 29.7 26.6 - 33.0 pg    MCHC 32.4 31.5 - 35.7 g/dL    RDW 13.3 11.7 - 15.4 %    Platelets 209 150 - 450 x10E3/uL   Comprehensive Metabolic Panel    Collection Time: 03/15/24  9:36 AM    Specimen: Blood   Result Value Ref Range    Glucose 75 70 - 99 mg/dL    BUN 6 6 - 20 mg/dL    Creatinine 0.98 0.57 - 1.00 mg/dL    EGFR Result 81 >59 mL/min/1.73    BUN/Creatinine Ratio 6 (L) 9 - 23    Sodium 138 134 - 144 mmol/L    Potassium 4.2 3.5 - 5.2 mmol/L    Chloride 105 96 - 106 mmol/L    Total CO2 19 (L) 20 - 29 mmol/L    Calcium 9.8 8.7 - 10.2 mg/dL    Total Protein 7.8 6.0 - 8.5 g/dL    Albumin 4.7 4.0 - 5.0 g/dL    Globulin 3.1 1.5 - 4.5 g/dL    A/G Ratio 1.5 1.2 - 2.2    Total Bilirubin 0.4 0.0 - 1.2 mg/dL    Alkaline Phosphatase 127 (H) 44 - 121 IU/L    AST (SGOT) 42 (H) 0 - 40 IU/L    ALT (SGPT) 166 (H) 0 - 32 IU/L   Ferritin    Collection Time: 03/15/24  9:36 AM    Specimen: Blood   Result Value Ref Range    Ferritin 115 15 - 150 ng/mL   Folate    Collection Time: 03/15/24  9:36 AM    Specimen: Blood   Result Value Ref Range    Folate >20.0 >3.0 ng/mL   Iron Profile    Collection Time: 03/15/24  9:36 AM    Specimen: Blood   Result Value Ref Range    TIBC 347 250 - 450 ug/dL    UIBC 276 131 - 425 ug/dL    Iron 71 27 - 159 ug/dL    Iron Saturation 20 15 - 55 %   Lipid Panel With / Chol / HDL Ratio    Collection Time: 03/15/24  9:36 AM    Specimen: Blood   Result Value Ref Range    Total Cholesterol 288 (H) 100 - 199 mg/dL    Triglycerides 185 (H) 0 - 149 mg/dL    HDL Cholesterol 48 >39 mg/dL    VLDL Cholesterol Doug 35 5 - 40 mg/dL    LDL Chol Calc (NIH) 205 (H) 0 - 99 mg/dL    Comment Comment     Chol/HDL Ratio 6.0 (H) 0.0 - 4.4 ratio   TSH    Collection Time: 03/15/24  9:36 AM    Specimen: Blood   Result Value Ref Range    TSH 3.300 0.450 - 4.500 uIU/mL   Vitamin B12    Collection Time: 03/15/24  9:36 AM    Specimen: Blood   Result Value Ref Range    Vitamin  B-12 806 232 - 1,245 pg/mL   Vitamin D,25-Hydroxy    Collection Time: 03/15/24  9:36 AM    Specimen: Blood   Result Value Ref Range    25 Hydroxy, Vitamin D 29.8 (L) 30.0 - 100.0 ng/mL   Urinalysis With Microscopic - Urine, Clean Catch    Collection Time: 03/15/24  9:36 AM    Specimen: Urine, Clean Catch   Result Value Ref Range    Specific Gravity, UA 1.015 1.005 - 1.030    pH, UA 8.0 (H) 5.0 - 7.5    Color, UA Yellow Yellow    Appearance, UA Cloudy (A) Clear    Leukocytes, UA Trace (A) Negative    Protein 1+ (A) Negative/Trace    Glucose, UA Negative Negative    Ketones Negative Negative    Blood, UA Negative Negative    Bilirubin, UA Negative Negative    Urobilinogen, UA 0.2 0.2 - 1.0 mg/dL    Nitrite, UA Negative Negative    Microscopic Examination See below:    Hepatitis C Antibody    Collection Time: 03/15/24  9:36 AM    Specimen: Blood   Result Value Ref Range    Hep C Virus Ab Equivocal (A) Non Reactive   Microscopic Examination -    Collection Time: 03/15/24  9:36 AM   Result Value Ref Range    WBC, UA None seen 0 - 5 /hpf    RBC, UA None seen 0 - 2 /hpf    Epithelial Cells (non renal) >10 (A) 0 - 10 /hpf    Casts None seen None seen /lpf    Bacteria, UA Few None seen/Few   Hepatitis panel, acute    Collection Time: 04/05/24  9:54 AM    Specimen: Blood   Result Value Ref Range    Hep A IgM Negative Negative    Hepatitis B Surface Ag Negative Negative    Hep B Core IgM Negative Negative    Hepatitis C Ab Non Reactive Non Reactive   HCV RT-PCR,Quant(Non-Graph)    Collection Time: 04/05/24  9:54 AM    Specimen: Blood   Result Value Ref Range    Hepatitis C Quantitation HCV Not Detected IU/mL    Test Information Comment    Interpretation:    Collection Time: 04/05/24  9:54 AM   Result Value Ref Range    Interpretation Comment    CBC AND DIFFERENTIAL    Collection Time: 05/21/24  3:46 PM    Specimen type and source: Whole Blood, Blood specimen from patient (specimen)   Result Value Ref Range    WBC 8.49 4.5 -  11.0 10*3/uL    RBC 4.59 4.0 - 5.2 10*6/uL    Hemoglobin 14.0 12.0 - 16.0 g/dL    Hematocrit 43.2 36.0 - 46.0 %    MCV 94.1 80.0 - 100.0 fL    MCH 30.5 26.0 - 34.0 pg    MCHC 32.4 31.0 - 37.0 g/dL    RDW 12.4 12.0 - 16.8 %    Platelets 231 140 - 440 10*3/uL    MPV 10.7 8.4 - 12.4 fL    Differential Type [facility] CBC w/AutoDiff (arb'U)    Neutrophil Rel % 62.4 45 - 80 %    Lymphocyte Rel % 23.6 15 - 50 %    Monocyte Rel % 6.6 0 - 15 %    Eosinophil % 6.7 0 - 7 %    Basophil Rel % 0.5 0 - 2 %    Neutrophils Absolute 5.30 2.0 - 8.8 10*3/uL    Lymphocytes Absolute 2.00 0.7 - 5.5 10*3/uL    Monocytes Absolute 0.56 0.0 - 1.7 10*3/uL    Eosinophils Absolute 0.57 0.0 - 0.8 10*3/uL    Basophils Absolute 0.04 0.0 - 0.2 10*3/uL    Immature Grans % 0.2 0.0 - 1.0 %    Immature Grans, Absolute 0.02 0.00 - 0.10 10*3/uL   Pregnancy, Urine -    Collection Time: 05/21/24  4:51 PM    Specimen: Urine    Specimen type and source: Urine, Urine specimen (specimen)   Result Value Ref Range    HCG Qualitative Negative Negative           Assessment & Plan   Diagnoses and all orders for this visit:    1. Hidradenitis suppurativa (Primary)    2. Iron deficiency anemia secondary to inadequate dietary iron intake  -     ferrous gluconate 324 (37.5 Fe) MG tablet tablet; Take 1 tablet by mouth Daily With Breakfast.  Dispense: 90 tablet; Refill: 1    3. Migraine with aura and without status migrainosus, not intractable  -     topiramate (TOPAMAX) 50 MG tablet; Take 1 tablet by mouth 2 (Two) Times a Day.  Dispense: 180 tablet; Refill: 1    4. Vitamin D deficiency  -     Vitamin D,25-Hydroxy    5. Hypercholesterolemia  -     Lipid Panel With / Chol / HDL Ratio              Medications, including side effects, were discussed with the patient. Patient verbalized understanding.  The plan of care was discussed. All questions were answered. Patient verbalized understanding.      Return in about 6 months (around 1/11/2025) for fasting labs one week prior  to.

## 2024-07-14 LAB
25(OH)D3+25(OH)D2 SERPL-MCNC: 142.4 NG/ML (ref 30–100)
CHOLEST SERPL-MCNC: 191 MG/DL (ref 100–199)
CHOLEST/HDLC SERPL: 4 RATIO (ref 0–4.4)
HDLC SERPL-MCNC: 48 MG/DL
LDLC SERPL CALC-MCNC: 129 MG/DL (ref 0–99)
TRIGL SERPL-MCNC: 77 MG/DL (ref 0–149)
VLDLC SERPL CALC-MCNC: 14 MG/DL (ref 5–40)

## 2024-07-22 ENCOUNTER — TELEPHONE (OUTPATIENT)
Dept: INTERNAL MEDICINE | Facility: CLINIC | Age: 29
End: 2024-07-22

## 2024-07-22 NOTE — TELEPHONE ENCOUNTER
Caller: Eugenia Le    Relationship: Self    Best call back number:     Rey Eugenia (Self) [phone] (Mobile)     What was the call regarding: PATIENT IS WANTING TO SPEAK WITH THE OFFICE REGARDING A DERMATOLOGY REFERRAL THAT WAS TO BE ORDERED FOR HER     CAN YOU CALL AND ADVISE THE STATUS     Is it okay if the provider responds through MyChart: CALL

## 2024-07-25 DIAGNOSIS — G25.81 RLS (RESTLESS LEGS SYNDROME): ICD-10-CM

## 2024-07-25 DIAGNOSIS — L73.2 HIDRADENITIS SUPPURATIVA: Primary | ICD-10-CM

## 2024-07-26 RX ORDER — ROPINIROLE 0.5 MG/1
TABLET, FILM COATED ORAL
Qty: 30 TABLET | Refills: 0 | Status: SHIPPED | OUTPATIENT
Start: 2024-07-26

## 2024-07-30 NOTE — TELEPHONE ENCOUNTER
Tried to call patient to discuss but was unable to reach and left a voicemail for patient to call back to discuss.

## 2024-07-31 ENCOUNTER — TELEPHONE (OUTPATIENT)
Dept: INTERNAL MEDICINE | Facility: CLINIC | Age: 29
End: 2024-07-31

## 2024-07-31 NOTE — TELEPHONE ENCOUNTER
Caller: Eugenia Le    Relationship: Self    Best call back number: [phone]      What was the call regarding: PATIENT RETURNED MISSED CALL   PLEASE CALL BACK

## 2024-08-05 DIAGNOSIS — G43.809 OTHER MIGRAINE WITHOUT STATUS MIGRAINOSUS, NOT INTRACTABLE: ICD-10-CM

## 2024-08-05 DIAGNOSIS — G43.109 MIGRAINE WITH AURA AND WITHOUT STATUS MIGRAINOSUS, NOT INTRACTABLE: ICD-10-CM

## 2024-08-06 RX ORDER — ONDANSETRON 4 MG/1
4 TABLET, ORALLY DISINTEGRATING ORAL EVERY 6 HOURS PRN
Qty: 20 TABLET | Refills: 5 | Status: SHIPPED | OUTPATIENT
Start: 2024-08-06

## 2024-08-06 RX ORDER — KETOROLAC TROMETHAMINE 10 MG/1
10 TABLET, FILM COATED ORAL EVERY 6 HOURS PRN
Qty: 15 TABLET | Refills: 5 | Status: SHIPPED | OUTPATIENT
Start: 2024-08-06

## 2024-08-14 DIAGNOSIS — G43.111 INTRACTABLE MIGRAINE WITH AURA WITH STATUS MIGRAINOSUS: ICD-10-CM

## 2024-08-14 RX ORDER — SUMATRIPTAN 50 MG/1
TABLET, FILM COATED ORAL
Qty: 9 TABLET | Refills: 3 | Status: SHIPPED | OUTPATIENT
Start: 2024-08-14

## 2024-08-25 DIAGNOSIS — G25.81 RLS (RESTLESS LEGS SYNDROME): ICD-10-CM

## 2024-08-29 RX ORDER — ROPINIROLE 0.5 MG/1
TABLET, FILM COATED ORAL
Qty: 30 TABLET | Refills: 0 | Status: SHIPPED | OUTPATIENT
Start: 2024-08-29

## 2024-08-29 NOTE — TELEPHONE ENCOUNTER
Rx Refill Note  Requested Prescriptions     Pending Prescriptions Disp Refills    rOPINIRole (REQUIP) 0.5 MG tablet [Pharmacy Med Name: rOPINIRole HCl 0.5 MG Oral Tablet] 30 tablet 0     Sig: TAKE 1 TABLET BY MOUTH IN THE EVENING ONE HOUR BEFORE BEDTIME      Last office visit with prescribing clinician: Visit date not found   Last telemedicine visit with prescribing clinician: Visit date not found   Next office visit with prescribing clinician: Visit date not found                         Would you like a call back once the refill request has been completed: [] Yes [] No    If the office needs to give you a call back, can they leave a voicemail: [] Yes [] No    Augustus Garcia MA  08/29/24, 13:29 EDT

## 2024-09-01 DIAGNOSIS — G43.109 MIGRAINE WITH AURA AND WITHOUT STATUS MIGRAINOSUS, NOT INTRACTABLE: ICD-10-CM

## 2024-09-01 DIAGNOSIS — E55.9 VITAMIN D DEFICIENCY: ICD-10-CM

## 2024-09-03 DIAGNOSIS — G43.109 MIGRAINE WITH AURA AND WITHOUT STATUS MIGRAINOSUS, NOT INTRACTABLE: ICD-10-CM

## 2024-09-03 DIAGNOSIS — E55.9 VITAMIN D DEFICIENCY: ICD-10-CM

## 2024-09-03 RX ORDER — SERTRALINE HYDROCHLORIDE 100 MG/1
100 TABLET, FILM COATED ORAL DAILY
Qty: 90 TABLET | Refills: 0 | OUTPATIENT
Start: 2024-09-03

## 2024-09-03 NOTE — TELEPHONE ENCOUNTER
Caller: Eugenia Le    Relationship: Self    Best call back number: [phone]     Requested Prescriptions:   Requested Prescriptions     Pending Prescriptions Disp Refills    ketorolac (TORADOL) 10 MG tablet 15 tablet 5     Sig: Take 1 tablet by mouth Every 6 (Six) Hours As Needed (Migraine headache).    sertraline (ZOLOFT) 100 MG tablet 90 tablet 0     Sig: Take 1 tablet by mouth Daily.    vitamin D (ERGOCALCIFEROL) 1.25 MG (11136 UT) capsule capsule 30 capsule 3     Sig: Take 1 capsule by mouth 2 (Two) Times a Week.    topiramate (TOPAMAX) 50 MG tablet 180 tablet 1     Sig: Take 1 tablet by mouth 2 (Two) Times a Day.        Pharmacy where request should be sent: [facility] [address] [place] [address] [phone] [facility] [phone] FX     Last office visit with prescribing clinician: 7/11/2024   Last telemedicine visit with prescribing clinician: Visit date not found   Next office visit with prescribing clinician: 1/13/2025     Does the patient have less than a 3 day supply:  [x] Yes  [] No    Julianne Trejo Rep   09/03/24 11:56 EDT

## 2024-09-05 RX ORDER — SERTRALINE HYDROCHLORIDE 100 MG/1
100 TABLET, FILM COATED ORAL DAILY
Qty: 90 TABLET | Refills: 0 | OUTPATIENT
Start: 2024-09-05

## 2024-09-05 RX ORDER — TOPIRAMATE 50 MG/1
50 TABLET, FILM COATED ORAL 2 TIMES DAILY
Qty: 180 TABLET | Refills: 1 | OUTPATIENT
Start: 2024-09-05

## 2024-09-05 RX ORDER — KETOROLAC TROMETHAMINE 10 MG/1
10 TABLET, FILM COATED ORAL EVERY 6 HOURS PRN
Qty: 15 TABLET | Refills: 5 | OUTPATIENT
Start: 2024-09-05

## 2024-09-05 RX ORDER — ERGOCALCIFEROL 1.25 MG/1
50000 CAPSULE, LIQUID FILLED ORAL 2 TIMES WEEKLY
Qty: 30 CAPSULE | Refills: 3 | OUTPATIENT
Start: 2024-09-05

## 2024-09-05 NOTE — TELEPHONE ENCOUNTER
Rx Refill Note  Requested Prescriptions     Pending Prescriptions Disp Refills    ketorolac (TORADOL) 10 MG tablet 15 tablet 5     Sig: Take 1 tablet by mouth Every 6 (Six) Hours As Needed (Migraine headache).    sertraline (ZOLOFT) 100 MG tablet 90 tablet 0     Sig: Take 1 tablet by mouth Daily.    vitamin D (ERGOCALCIFEROL) 1.25 MG (86633 UT) capsule capsule 30 capsule 3     Sig: Take 1 capsule by mouth 2 (Two) Times a Week.    topiramate (TOPAMAX) 50 MG tablet 180 tablet 1     Sig: Take 1 tablet by mouth 2 (Two) Times a Day.      Last office visit with prescribing clinician: 7/11/2024   Last telemedicine visit with prescribing clinician: Visit date not found   Next office visit with prescribing clinician: 9/12/2024                         Would you like a call back once the refill request has been completed: [] Yes [] No    If the office needs to give you a call back, can they leave a voicemail: [] Yes [] No    Francy Vazquez MA  09/05/24, 10:01 EDT

## 2024-09-05 NOTE — TELEPHONE ENCOUNTER
Rx Refill Note  Requested Prescriptions     Pending Prescriptions Disp Refills    vitamin D (ERGOCALCIFEROL) 1.25 MG (85332 UT) capsule capsule 30 capsule 3     Sig: Take 1 capsule by mouth 2 (Two) Times a Week.    topiramate (TOPAMAX) 50 MG tablet 180 tablet 1     Sig: Take 1 tablet by mouth 2 (Two) Times a Day.      Last office visit with prescribing clinician: 7/11/2024   Last telemedicine visit with prescribing clinician: Visit date not found   Next office visit with prescribing clinician: 9/3/2024                         Would you like a call back once the refill request has been completed: [] Yes [] No    If the office needs to give you a call back, can they leave a voicemail: [] Yes [] No    Francy Vazquez MA  09/05/24, 10:01 EDT

## 2024-09-12 ENCOUNTER — TELEPHONE (OUTPATIENT)
Dept: INTERNAL MEDICINE | Facility: CLINIC | Age: 29
End: 2024-09-12

## 2024-09-12 ENCOUNTER — OFFICE VISIT (OUTPATIENT)
Dept: INTERNAL MEDICINE | Facility: CLINIC | Age: 29
End: 2024-09-12
Payer: COMMERCIAL

## 2024-09-12 VITALS
HEART RATE: 90 BPM | OXYGEN SATURATION: 100 % | BODY MASS INDEX: 30.34 KG/M2 | SYSTOLIC BLOOD PRESSURE: 110 MMHG | HEIGHT: 57 IN | DIASTOLIC BLOOD PRESSURE: 70 MMHG | WEIGHT: 140.6 LBS | TEMPERATURE: 98.6 F

## 2024-09-12 DIAGNOSIS — K21.00 GASTROESOPHAGEAL REFLUX DISEASE WITH ESOPHAGITIS WITHOUT HEMORRHAGE: ICD-10-CM

## 2024-09-12 DIAGNOSIS — R13.14 PHARYNGOESOPHAGEAL DYSPHAGIA: Primary | ICD-10-CM

## 2024-09-12 PROCEDURE — 1160F RVW MEDS BY RX/DR IN RCRD: CPT | Performed by: NURSE PRACTITIONER

## 2024-09-12 PROCEDURE — 1159F MED LIST DOCD IN RCRD: CPT | Performed by: NURSE PRACTITIONER

## 2024-09-12 PROCEDURE — 99214 OFFICE O/P EST MOD 30 MIN: CPT | Performed by: NURSE PRACTITIONER

## 2024-09-12 PROCEDURE — 1125F AMNT PAIN NOTED PAIN PRSNT: CPT | Performed by: NURSE PRACTITIONER

## 2024-09-12 RX ORDER — FAMOTIDINE 20 MG/1
20 TABLET, FILM COATED ORAL 2 TIMES DAILY
Qty: 180 TABLET | Refills: 1 | Status: SHIPPED | OUTPATIENT
Start: 2024-09-12

## 2024-09-12 RX ORDER — OMEPRAZOLE 40 MG/1
40 CAPSULE, DELAYED RELEASE ORAL 2 TIMES DAILY
Qty: 180 CAPSULE | Refills: 1 | Status: SHIPPED | OUTPATIENT
Start: 2024-09-12

## 2024-09-12 NOTE — TELEPHONE ENCOUNTER
Name: LeAcEugenia      Relationship: Self      Best Callback Number:   [phone]      HUB PROVIDED THE RELAY MESSAGE FROM THE OFFICE      PATIENT: VOICED UNDERSTANDING AND HAS NO FURTHER QUESTIONS AT THIS TIME    ADDITIONAL INFORMATION:

## 2024-09-12 NOTE — PROGRESS NOTES
Chief Complaint   Patient presents with    Heartburn       SUBJECTIVE  Eugenia Le is a 29 y.o. female presenting today for follow up of her chronic health conditions.    Pt has GERD/EOE. She notes persistent heartburn and acid reflux. She also c/o nausea and vomiting. She notes little relief w/ Omeprazole. Last EGD was 08/2021 or 2022. Her previous GI was in [facility]. Last OV was >1yr ago. She notes difficult/painful swallowing. Affirms choking.    Outpatient Medications Marked as Taking for the 9/12/24 encounter (Office Visit) with Maribel Estes APRN   Medication Sig Dispense Refill    busPIRone (BUSPAR) 5 MG tablet Take 1 tablet by mouth Every Night. May also take 1 tablet 2 (Two) Times a Day As Needed (anxiety). 90 tablet 1    ferrous gluconate 324 (37.5 Fe) MG tablet tablet Take 1 tablet by mouth Daily With Breakfast. 90 tablet 1    ketorolac (TORADOL) 10 MG tablet Take 1 tablet by mouth Every 6 (Six) Hours As Needed (Migraine headache). 15 tablet 5    omeprazole (priLOSEC) 40 MG capsule Take 1 capsule by mouth Every Morning Before Breakfast. 90 capsule 1    ondansetron ODT (ZOFRAN-ODT) 4 MG disintegrating tablet Place 1 tablet on the tongue Every 6 (Six) Hours As Needed for Nausea or Vomiting. 20 tablet 5    promethazine (PHENERGAN) 25 MG tablet Take one tablet by mouth every 6 hours as needed for nausea 20 tablet 5    rOPINIRole (REQUIP) 0.5 MG tablet TAKE 1 TABLET BY MOUTH IN THE EVENING ONE HOUR BEFORE BEDTIME 30 tablet 0    sertraline (ZOLOFT) 100 MG tablet Take 1 tablet by mouth once daily 90 tablet 0    SUMAtriptan (Imitrex) 50 MG tablet Take one tablet at onset of headache. May repeat dose one time in 2 hours if headache not relieved. 9 tablet 3    topiramate (TOPAMAX) 50 MG tablet Take 1 tablet by mouth 2 (Two) Times a Day. 180 tablet 1    vitamin D (ERGOCALCIFEROL) 1.25 MG (84752 UT) capsule capsule Take 1 capsule by mouth 2 (Two) Times a Week. 30 capsule 3         The  "following portions of the patient's history were reviewed and updated as appropriate: allergies, current medications, past family history, past medical history, past social history, past surgical history and problem list.        Objective   Vitals:    09/12/24 1415   BP: 110/70   BP Location: Right arm   Patient Position: Sitting   Cuff Size: Adult   Pulse: 90   Temp: 98.6 °F (37 °C)   TempSrc: Infrared   SpO2: 100%   Weight: 63.8 kg (140 lb 9.6 oz)   Height: 144.8 cm (57\")       BP Readings from Last 3 Encounters:   09/12/24 110/70   07/11/24 110/80   05/07/24 122/86        Wt Readings from Last 3 Encounters:   09/12/24 63.8 kg (140 lb 9.6 oz)   07/11/24 61.3 kg (135 lb 3.2 oz)   05/07/24 63.8 kg (140 lb 9.6 oz)        Body mass index is 30.43 kg/m².  Nursing notes and vitals reviewed.    Physical Exam  Constitutional:       General: She is not in acute distress.     Appearance: She is well-developed.   HENT:      Right Ear: Hearing, tympanic membrane, ear canal and external ear normal.      Left Ear: Hearing, tympanic membrane, ear canal and external ear normal.      Nose: Nose normal.      Mouth/Throat:      Mouth: Mucous membranes are moist.      Pharynx: Oropharynx is clear. Uvula midline.   Neck:      Thyroid: No thyroid mass or thyromegaly.   Cardiovascular:      Rate and Rhythm: Regular rhythm.      Pulses: Normal pulses.      Heart sounds: S1 normal and S2 normal. No murmur heard.     No friction rub. No gallop.   Pulmonary:      Effort: Pulmonary effort is normal.      Breath sounds: Normal breath sounds. No wheezing, rhonchi or rales.   Abdominal:      General: Abdomen is flat. Bowel sounds are normal.      Palpations: Abdomen is soft. There is no hepatomegaly, splenomegaly or mass.      Tenderness: There is no abdominal tenderness.   Musculoskeletal:      Cervical back: Neck supple.   Lymphadenopathy:      Cervical: No cervical adenopathy.   Neurological:      Mental Status: She is alert and oriented to " person, place, and time.      Cranial Nerves: No cranial nerve deficit.      Sensory: No sensory deficit.   Psychiatric:         Attention and Perception: She is attentive.         Speech: Speech normal.         Behavior: Behavior normal.         No results found for this or any previous visit (from the past 672 hour(s)).      Assessment & Plan   Diagnoses and all orders for this visit:    1. Pharyngoesophageal dysphagia (Primary)  -     Ambulatory Referral to Gastroenterology    2. Gastroesophageal reflux disease with esophagitis without hemorrhage  -     omeprazole (priLOSEC) 40 MG capsule; Take 1 capsule by mouth 2 (Two) Times a Day.  Dispense: 180 capsule; Refill: 1  -     famotidine (Pepcid) 20 MG tablet; Take 1 tablet by mouth 2 (Two) Times a Day.  Dispense: 180 tablet; Refill: 1  -     Ambulatory Referral to Gastroenterology      Increase PPI to BID.  Add H2.        Medications, including side effects, were discussed with the patient. Patient verbalized understanding.  The plan of care was discussed. All questions were answered. Patient verbalized understanding.

## 2024-09-12 NOTE — TELEPHONE ENCOUNTER
RELAY    Medications that the patient requested for a refill were requested too soon. Medications were sent into the patients pharmacy for 6 month supply on all of the medications that she requested. Patient should be able to contact her pharmacy and have them refill the prescriptions.

## 2024-09-12 NOTE — TELEPHONE ENCOUNTER
Tried to call patient regarding her refill requests but was unable to reach and left a voicemail for her to call back.

## 2024-09-24 ENCOUNTER — OFFICE VISIT (OUTPATIENT)
Dept: GASTROENTEROLOGY | Facility: CLINIC | Age: 29
End: 2024-09-24
Payer: COMMERCIAL

## 2024-09-24 ENCOUNTER — TELEPHONE (OUTPATIENT)
Dept: GASTROENTEROLOGY | Facility: CLINIC | Age: 29
End: 2024-09-24

## 2024-09-24 ENCOUNTER — PREP FOR SURGERY (OUTPATIENT)
Dept: SURGERY | Facility: SURGERY CENTER | Age: 29
End: 2024-09-24
Payer: COMMERCIAL

## 2024-09-24 VITALS
SYSTOLIC BLOOD PRESSURE: 120 MMHG | WEIGHT: 143.2 LBS | DIASTOLIC BLOOD PRESSURE: 78 MMHG | BODY MASS INDEX: 30.89 KG/M2 | HEIGHT: 57 IN

## 2024-09-24 DIAGNOSIS — G25.81 RLS (RESTLESS LEGS SYNDROME): ICD-10-CM

## 2024-09-24 DIAGNOSIS — K20.0 EOSINOPHILIC ESOPHAGITIS: ICD-10-CM

## 2024-09-24 DIAGNOSIS — K58.1 IRRITABLE BOWEL SYNDROME WITH CONSTIPATION: ICD-10-CM

## 2024-09-24 DIAGNOSIS — R13.19 ESOPHAGEAL DYSPHAGIA: Primary | ICD-10-CM

## 2024-09-24 DIAGNOSIS — K21.00 GASTROESOPHAGEAL REFLUX DISEASE WITH ESOPHAGITIS WITHOUT HEMORRHAGE: Primary | ICD-10-CM

## 2024-09-24 DIAGNOSIS — K21.00 GASTROESOPHAGEAL REFLUX DISEASE WITH ESOPHAGITIS WITHOUT HEMORRHAGE: ICD-10-CM

## 2024-09-24 PROCEDURE — 99214 OFFICE O/P EST MOD 30 MIN: CPT

## 2024-09-24 RX ORDER — VONOPRAZAN FUMARATE 26.72 MG/1
20 TABLET ORAL DAILY
Qty: 90 TABLET | Refills: 1 | Status: SHIPPED | OUTPATIENT
Start: 2024-09-24

## 2024-09-24 RX ORDER — SODIUM CHLORIDE, SODIUM LACTATE, POTASSIUM CHLORIDE, CALCIUM CHLORIDE 600; 310; 30; 20 MG/100ML; MG/100ML; MG/100ML; MG/100ML
30 INJECTION, SOLUTION INTRAVENOUS CONTINUOUS PRN
OUTPATIENT
Start: 2024-09-24

## 2024-09-24 RX ORDER — SODIUM CHLORIDE 0.9 % (FLUSH) 0.9 %
10 SYRINGE (ML) INJECTION AS NEEDED
OUTPATIENT
Start: 2024-09-24

## 2024-09-24 RX ORDER — SODIUM CHLORIDE 0.9 % (FLUSH) 0.9 %
3 SYRINGE (ML) INJECTION EVERY 12 HOURS SCHEDULED
OUTPATIENT
Start: 2024-09-24

## 2024-09-24 RX ORDER — ROPINIROLE 0.5 MG/1
TABLET, FILM COATED ORAL
Qty: 30 TABLET | Refills: 0 | Status: SHIPPED | OUTPATIENT
Start: 2024-09-24

## 2024-10-13 DIAGNOSIS — G43.111 INTRACTABLE MIGRAINE WITH AURA WITH STATUS MIGRAINOSUS: ICD-10-CM

## 2024-10-13 DIAGNOSIS — G43.109 MIGRAINE WITH AURA AND WITHOUT STATUS MIGRAINOSUS, NOT INTRACTABLE: ICD-10-CM

## 2024-10-14 RX ORDER — KETOROLAC TROMETHAMINE 10 MG/1
10 TABLET, FILM COATED ORAL EVERY 6 HOURS PRN
Qty: 15 TABLET | Refills: 5 | Status: SHIPPED | OUTPATIENT
Start: 2024-10-14

## 2024-10-14 RX ORDER — SUMATRIPTAN 50 MG/1
TABLET, FILM COATED ORAL
Qty: 9 TABLET | Refills: 3 | Status: SHIPPED | OUTPATIENT
Start: 2024-10-14

## 2024-10-29 ENCOUNTER — ANESTHESIA EVENT (OUTPATIENT)
Dept: PERIOP | Facility: HOSPITAL | Age: 29
End: 2024-10-29
Payer: COMMERCIAL

## 2024-10-30 ENCOUNTER — ANESTHESIA (OUTPATIENT)
Dept: PERIOP | Facility: HOSPITAL | Age: 29
End: 2024-10-30
Payer: COMMERCIAL

## 2024-10-30 ENCOUNTER — HOSPITAL ENCOUNTER (OUTPATIENT)
Facility: HOSPITAL | Age: 29
Setting detail: HOSPITAL OUTPATIENT SURGERY
Discharge: HOME | End: 2024-10-30
Attending: STUDENT IN AN ORGANIZED HEALTH CARE EDUCATION/TRAINING PROGRAM | Admitting: STUDENT IN AN ORGANIZED HEALTH CARE EDUCATION/TRAINING PROGRAM
Payer: COMMERCIAL

## 2024-10-30 VITALS
OXYGEN SATURATION: 100 % | DIASTOLIC BLOOD PRESSURE: 69 MMHG | WEIGHT: 141.6 LBS | SYSTOLIC BLOOD PRESSURE: 105 MMHG | BODY MASS INDEX: 30.55 KG/M2 | TEMPERATURE: 98.2 F | RESPIRATION RATE: 16 BRPM | HEART RATE: 92 BPM | HEIGHT: 57 IN

## 2024-10-30 DIAGNOSIS — K20.0 EOSINOPHILIC ESOPHAGITIS: ICD-10-CM

## 2024-10-30 DIAGNOSIS — K21.00 GASTROESOPHAGEAL REFLUX DISEASE WITH ESOPHAGITIS WITHOUT HEMORRHAGE: ICD-10-CM

## 2024-10-30 DIAGNOSIS — R13.19 ESOPHAGEAL DYSPHAGIA: ICD-10-CM

## 2024-10-30 PROCEDURE — 88305 TISSUE EXAM BY PATHOLOGIST: CPT | Performed by: STUDENT IN AN ORGANIZED HEALTH CARE EDUCATION/TRAINING PROGRAM

## 2024-10-30 PROCEDURE — 25010000002 LIDOCAINE 2% SOLUTION

## 2024-10-30 PROCEDURE — 25010000002 PROPOFOL 200 MG/20ML EMULSION

## 2024-10-30 PROCEDURE — 43239 EGD BIOPSY SINGLE/MULTIPLE: CPT | Performed by: STUDENT IN AN ORGANIZED HEALTH CARE EDUCATION/TRAINING PROGRAM

## 2024-10-30 RX ORDER — SODIUM CHLORIDE 0.9 % (FLUSH) 0.9 %
10 SYRINGE (ML) INJECTION EVERY 12 HOURS SCHEDULED
Status: DISCONTINUED | OUTPATIENT
Start: 2024-10-30 | End: 2024-10-30 | Stop reason: HOSPADM

## 2024-10-30 RX ORDER — SODIUM CHLORIDE, SODIUM LACTATE, POTASSIUM CHLORIDE, CALCIUM CHLORIDE 600; 310; 30; 20 MG/100ML; MG/100ML; MG/100ML; MG/100ML
100 INJECTION, SOLUTION INTRAVENOUS ONCE
Status: DISCONTINUED | OUTPATIENT
Start: 2024-10-30 | End: 2024-10-30 | Stop reason: HOSPADM

## 2024-10-30 RX ORDER — LIDOCAINE HYDROCHLORIDE 10 MG/ML
0.5 INJECTION, SOLUTION INFILTRATION; PERINEURAL ONCE AS NEEDED
Status: DISCONTINUED | OUTPATIENT
Start: 2024-10-30 | End: 2024-10-30 | Stop reason: HOSPADM

## 2024-10-30 RX ORDER — LIDOCAINE HYDROCHLORIDE 20 MG/ML
INJECTION, SOLUTION INFILTRATION; PERINEURAL AS NEEDED
Status: DISCONTINUED | OUTPATIENT
Start: 2024-10-30 | End: 2024-10-30 | Stop reason: SURG

## 2024-10-30 RX ORDER — LIDOCAINE HYDROCHLORIDE 20 MG/ML
5 SOLUTION OROPHARYNGEAL ONCE
Status: DISCONTINUED | OUTPATIENT
Start: 2024-10-30 | End: 2024-10-30 | Stop reason: HOSPADM

## 2024-10-30 RX ORDER — PROPOFOL 10 MG/ML
INJECTION, EMULSION INTRAVENOUS AS NEEDED
Status: DISCONTINUED | OUTPATIENT
Start: 2024-10-30 | End: 2024-10-30 | Stop reason: SURG

## 2024-10-30 RX ORDER — SODIUM CHLORIDE 0.9 % (FLUSH) 0.9 %
3 SYRINGE (ML) INJECTION EVERY 12 HOURS SCHEDULED
Status: DISCONTINUED | OUTPATIENT
Start: 2024-10-30 | End: 2024-10-30 | Stop reason: HOSPADM

## 2024-10-30 RX ORDER — SODIUM CHLORIDE, SODIUM LACTATE, POTASSIUM CHLORIDE, CALCIUM CHLORIDE 600; 310; 30; 20 MG/100ML; MG/100ML; MG/100ML; MG/100ML
30 INJECTION, SOLUTION INTRAVENOUS CONTINUOUS PRN
Status: DISCONTINUED | OUTPATIENT
Start: 2024-10-30 | End: 2024-10-30 | Stop reason: HOSPADM

## 2024-10-30 RX ORDER — SODIUM CHLORIDE 0.9 % (FLUSH) 0.9 %
10 SYRINGE (ML) INJECTION AS NEEDED
Status: DISCONTINUED | OUTPATIENT
Start: 2024-10-30 | End: 2024-10-30 | Stop reason: HOSPADM

## 2024-10-30 RX ORDER — ONDANSETRON 2 MG/ML
4 INJECTION INTRAMUSCULAR; INTRAVENOUS ONCE AS NEEDED
Status: DISCONTINUED | OUTPATIENT
Start: 2024-10-30 | End: 2024-10-30 | Stop reason: HOSPADM

## 2024-10-30 RX ADMIN — PROPOFOL 200 MG: 10 INJECTION, EMULSION INTRAVENOUS at 14:38

## 2024-10-30 RX ADMIN — Medication 10 ML: at 14:45

## 2024-10-30 RX ADMIN — LIDOCAINE HYDROCHLORIDE 60 MG: 20 INJECTION, SOLUTION INFILTRATION; PERINEURAL at 14:38

## 2024-10-30 NOTE — H&P
Patient Care Team:  Maribel Estes APRN as PCP - General (Family Medicine)    CHIEF COMPLAINT:  EGD for EOE surveillance.     HISTORY OF PRESENT ILLNESS:  EGD for EOE surveillance.     Past Medical History:   Diagnosis Date    Anemia     Anxiety     Depression     GERD (gastroesophageal reflux disease)     Hidradenitis suppurativa 2023    Migraines      Past Surgical History:   Procedure Laterality Date     SECTION  2018    EXCISION LESION N/A 2023    Procedure: EXCISION LESION from scalp;  Surgeon: Solis Paris MD;  Location: [facility] MAIN OR;  Service: General;  Laterality: N/A;    TEETH EXTRACTION      All top teeth removed- wears dentures    TUBAL ABDOMINAL LIGATION  2018     Family History   Problem Relation Age of Onset    Anxiety disorder Mother     Heart disease Father     Hypertension Father     Diverticulitis Father     Autism Sister     No Known Problems Daughter     No Known Problems Son     Diabetes Paternal Uncle     Asthma Paternal Uncle     Learning disabilities Sister     Cancer Paternal Grandmother         Leukemia     Social History     Tobacco Use    Smoking status: Former     Current packs/day: 0.00     Average packs/day: 0.2 packs/day for 1 year (0.3 ttl pk-yrs)     Types: Cigarettes     Start date: 2014     Quit date: 2014     Years since quittin.9     Passive exposure: Past    Smokeless tobacco: Never   Vaping Use    Vaping status: Never Used   Substance Use Topics    Alcohol use: Not Currently    Drug use: Never     Medications Prior to Admission   Medication Sig Dispense Refill Last Dose/Taking    busPIRone (BUSPAR) 5 MG tablet Take 1 tablet by mouth Every Night. May also take 1 tablet 2 (Two) Times a Day As Needed (anxiety). 90 tablet 1 10/29/2024    ferrous gluconate 324 (37.5 Fe) MG tablet tablet Take 1 tablet by mouth Daily With Breakfast. 90 tablet 1 10/29/2024    ketorolac (TORADOL) 10 MG tablet Take 1 tablet by mouth  "Every 6 (Six) Hours As Needed (Migraine headache). 15 tablet 5 Past Month    linaclotide (Linzess) 290 MCG capsule capsule Take 1 capsule by mouth Every Morning Before Breakfast. 90 capsule 3 10/29/2024    ondansetron ODT (ZOFRAN-ODT) 4 MG disintegrating tablet Place 1 tablet on the tongue Every 6 (Six) Hours As Needed for Nausea or Vomiting. 20 tablet 5 Past Week    promethazine (PHENERGAN) 25 MG tablet Take one tablet by mouth every 6 hours as needed for nausea 20 tablet 5 Past Week    sertraline (ZOLOFT) 100 MG tablet Take 1 tablet by mouth once daily 90 tablet 0 10/29/2024    SUMAtriptan (Imitrex) 50 MG tablet Take one tablet at onset of headache. May repeat dose one time in 2 hours if headache not relieved. 9 tablet 3 Past Month    topiramate (TOPAMAX) 50 MG tablet Take 1 tablet by mouth 2 (Two) Times a Day. 180 tablet 1 10/29/2024    vitamin D (ERGOCALCIFEROL) 1.25 MG (41071 UT) capsule capsule Take 1 capsule by mouth 2 (Two) Times a Week. 30 capsule 3 Past Week    Vonoprazan Fumarate (Voquezna) 20 MG tablet Take 1 tablet by mouth Daily. 90 tablet 1 Past Month    rOPINIRole (REQUIP) 0.5 MG tablet TAKE 1 TABLET BY MOUTH IN THE EVENING ONE HOUR BEFORE BEDTIME 30 tablet 0 More than a month     Allergies:  Patient has no known allergies.    REVIEW OF SYSTEMS:  Please see the above history of present illness for pertinent positives and negatives.  The remainder of the patient's systems have been reviewed and are negative.     Vital Signs  Heart Rate:  [67] 67  BP: (101)/(71) 101/71    Flowsheet Rows      Flowsheet Row First Filed Value   Admission Height 144.8 cm (57\") Documented at 10/30/2024 1429   Admission Weight 64.2 kg (141 lb 9.6 oz) Documented at 10/30/2024 1429             Physical Exam:  Physical Exam   Constitutional: Patient appears well-developed and well-nourished and in no acute distress   HEENT:   Head: Normocephalic and atraumatic.   Eyes:  Pupils are equal, round, and reactive to light. EOM are " intact. Sclerae are anicteric and non-injected.  Mouth and Throat: Patient has moist mucous membranes. Oropharynx is clear of any erythema or exudate.     Neck: Neck supple. No JVD present. No thyromegaly present. No lymphadenopathy present.  Cardiovascular: Regular rate, regular rhythm, S1 normal and S2 normal.  Exam reveals no gallop and no friction rub.  No murmur heard.  Pulmonary/Chest: Lungs are clear to auscultation bilaterally. No respiratory distress. No wheezes. No rhonchi. No rales.   Abdominal: Soft. Bowel sounds are normal. No distension and no mass. There is no hepatosplenomegaly. There is no tenderness.   Musculoskeletal: Normal Muscle tone  Extremities: No edema. Pulses are palpable in all 4 extremities.  Neurological: Patient is alert and oriented to person, place, and time. Cranial nerves II-XII are grossly intact with no focal deficits.  Skin: Skin is warm. No rash noted. Nails show no clubbing.  No cyanosis or erythema.    Debilities/Disabilities Identified: None  Emotional Behavior: Appropriate     Results Review:   I reviewed the patient's new clinical results.    Lab Results (most recent)       None            Imaging Results (Most Recent)       None          reviewed    ECG/EMG Results (most recent)       None          reviewed    Assessment & Plan   EGD for EOE surveillance.  /  EGD      I discussed the patient's findings and my recommendations with patient.     Rip Denny MD  10/30/24  14:38 EDT    Time: 10 min prior to procedure.

## 2024-10-30 NOTE — ANESTHESIA PREPROCEDURE EVALUATION
Anesthesia Evaluation     Patient summary reviewed and Nursing notes reviewed   no history of anesthetic complications:   NPO Solid Status: > 8 hours  NPO Liquid Status: > 8 hours           Airway   Mallampati: II  TM distance: >3 FB  Neck ROM: full  No difficulty expected  Dental - normal exam   (+) upper dentures    Pulmonary - negative pulmonary ROS and normal exam   Cardiovascular - negative cardio ROS and normal exam  Exercise tolerance: good (4-7 METS)    Rhythm: regular  Rate: normal        Neuro/Psych  (+) headaches (migraines)    ROS Comment: RLS  GI/Hepatic/Renal/Endo    (+) GERD (eosionphilic esophagitis) well controlled    Musculoskeletal (-) negative ROS    Abdominal  - normal exam   Substance History - negative use     OB/GYN negative ob/gyn ROS         Other   blood dyscrasia anemia,                 Anesthesia Plan    ASA 2     MAC       Anesthetic plan, risks, benefits, and alternatives have been provided, discussed and informed consent has been obtained with: patient.    Use of blood products discussed with patient  Consented to blood products.      CODE STATUS:

## 2024-10-30 NOTE — ANESTHESIA POSTPROCEDURE EVALUATION
Patient: Eugenia Le    Procedure Summary       Date: 10/30/24 Room / Location: [facility] ENDOSCOPY 2 /  [facility]    Anesthesia Start: 1433 Anesthesia Stop: 1448    Procedure: ESOPHAGOGASTRODUODENOSCOPY WITH BIOPSY Diagnosis:       Esophageal dysphagia      Gastroesophageal reflux disease with esophagitis without hemorrhage      Eosinophilic esophagitis      (Esophageal dysphagia [R13.19])      (Gastroesophageal reflux disease with esophagitis without hemorrhage [K21.00])      (Eosinophilic esophagitis [K20.0])    Surgeons: Rip Denny MD Provider: Guerda Hancock CRNA    Anesthesia Type: MAC ASA Status: 2            Anesthesia Type: MAC    Vitals  Vitals Value Taken Time   /65 10/30/24 1500   Temp 98.2 °F (36.8 °C) 10/30/24 1451   Pulse 89 10/30/24 1504   Resp 14 10/30/24 1451   SpO2 100 % 10/30/24 1504   Vitals shown include unfiled device data.        Post Anesthesia Care and Evaluation    Patient location during evaluation: PHASE II  Patient participation: complete - patient participated  Level of consciousness: awake and alert  Pain score: 0  Pain management: satisfactory to patient    Airway patency: patent  Anesthetic complications: No anesthetic complications  PONV Status: none  Cardiovascular status: acceptable  Respiratory status: acceptable  Hydration status: acceptable

## 2024-11-01 LAB
CYTO UR: NORMAL
LAB AP CASE REPORT: NORMAL
LAB AP DIAGNOSIS COMMENT: NORMAL
PATH REPORT.FINAL DX SPEC: NORMAL
PATH REPORT.GROSS SPEC: NORMAL

## 2024-11-02 DIAGNOSIS — K58.1 IRRITABLE BOWEL SYNDROME WITH CONSTIPATION: ICD-10-CM

## 2024-11-02 DIAGNOSIS — K20.0 EOSINOPHILIC ESOPHAGITIS: ICD-10-CM

## 2024-11-02 DIAGNOSIS — G43.111 INTRACTABLE MIGRAINE WITH AURA WITH STATUS MIGRAINOSUS: ICD-10-CM

## 2024-11-02 DIAGNOSIS — G43.809 OTHER MIGRAINE WITHOUT STATUS MIGRAINOSUS, NOT INTRACTABLE: ICD-10-CM

## 2024-11-02 DIAGNOSIS — K21.00 GASTROESOPHAGEAL REFLUX DISEASE WITH ESOPHAGITIS WITHOUT HEMORRHAGE: ICD-10-CM

## 2024-11-02 DIAGNOSIS — G43.109 MIGRAINE WITH AURA AND WITHOUT STATUS MIGRAINOSUS, NOT INTRACTABLE: ICD-10-CM

## 2024-11-04 ENCOUNTER — TELEPHONE (OUTPATIENT)
Dept: GASTROENTEROLOGY | Facility: CLINIC | Age: 29
End: 2024-11-04
Payer: COMMERCIAL

## 2024-11-04 DIAGNOSIS — G25.81 RLS (RESTLESS LEGS SYNDROME): ICD-10-CM

## 2024-11-04 RX ORDER — TOPIRAMATE 50 MG/1
50 TABLET, FILM COATED ORAL 2 TIMES DAILY
Qty: 180 TABLET | Refills: 1 | OUTPATIENT
Start: 2024-11-04

## 2024-11-04 RX ORDER — PROMETHAZINE HYDROCHLORIDE 25 MG/1
TABLET ORAL
Qty: 20 TABLET | Refills: 5 | Status: SHIPPED | OUTPATIENT
Start: 2024-11-04

## 2024-11-04 RX ORDER — ONDANSETRON 4 MG/1
4 TABLET, ORALLY DISINTEGRATING ORAL EVERY 6 HOURS PRN
Qty: 20 TABLET | Refills: 5 | OUTPATIENT
Start: 2024-11-04

## 2024-11-04 RX ORDER — KETOROLAC TROMETHAMINE 10 MG/1
10 TABLET, FILM COATED ORAL EVERY 6 HOURS PRN
Qty: 15 TABLET | Refills: 5 | OUTPATIENT
Start: 2024-11-04

## 2024-11-04 RX ORDER — SERTRALINE HYDROCHLORIDE 100 MG/1
100 TABLET, FILM COATED ORAL DAILY
Qty: 90 TABLET | Refills: 1 | Status: SHIPPED | OUTPATIENT
Start: 2024-11-04

## 2024-11-04 RX ORDER — ROPINIROLE 0.5 MG/1
TABLET, FILM COATED ORAL
Qty: 30 TABLET | Refills: 0 | Status: SHIPPED | OUTPATIENT
Start: 2024-11-04

## 2024-11-04 RX ORDER — VONOPRAZAN FUMARATE 26.72 MG/1
20 TABLET ORAL DAILY
Qty: 90 TABLET | Refills: 1 | OUTPATIENT
Start: 2024-11-04

## 2024-11-04 RX ORDER — SUMATRIPTAN 50 MG/1
TABLET, FILM COATED ORAL
Qty: 9 TABLET | Refills: 3 | OUTPATIENT
Start: 2024-11-04

## 2024-11-04 NOTE — TELEPHONE ENCOUNTER
Rx Refill Note  Requested Prescriptions     Pending Prescriptions Disp Refills    sertraline (ZOLOFT) 100 MG tablet 90 tablet 0     Sig: Take 1 tablet by mouth Daily.      Last office visit with prescribing clinician: 6/2/2023   Last telemedicine visit with prescribing clinician: Visit date not found   Next office visit with prescribing clinician: Visit date not found                         Would you like a call back once the refill request has been completed: [] Yes [] No    If the office needs to give you a call back, can they leave a voicemail: [] Yes [] No    Zita Ramos MA  11/04/24, 08:38 EST

## 2024-11-05 ENCOUNTER — TELEPHONE (OUTPATIENT)
Dept: GASTROENTEROLOGY | Facility: CLINIC | Age: 29
End: 2024-11-05
Payer: COMMERCIAL

## 2024-11-05 DIAGNOSIS — K20.0 EOSINOPHILIC ESOPHAGITIS: Primary | ICD-10-CM

## 2024-11-05 RX ORDER — DUPILUMAB 300 MG/2ML
300 INJECTION, SOLUTION SUBCUTANEOUS WEEKLY
Qty: 4 ML | Refills: 11 | Status: SHIPPED | OUTPATIENT
Start: 2024-11-05

## 2024-11-05 NOTE — PROGRESS NOTES
- EGD for EOE surveillance   - Findings/path: mucosal changes in mid esophagus suggestive of EOE (proximal esophageal biopsies had 25 eosinophils/HPF and distal esophageal biopsies had 4 eosinophils/HPF), grade B esophagitis, gastritis (biopsied -- negative for H Pylori), normal duodenum   - POC: Continue Voquezna for grade B esophagitis and gastritis seen. Start Dupixent 300 mg weekly for EOE given proximal esophageal biopsies had greater than 15 eosinophils/HPF indicating it is not adequately controlled with current medications.

## 2024-11-05 NOTE — TELEPHONE ENCOUNTER
Has the patient tried and failed at least 8 weeks of treatment with a topical glucocorticoid (e.g., budesonide inhaler or suspension, fluticasone inhaler or suspension)? * per cmm

## 2024-11-05 NOTE — TELEPHONE ENCOUNTER
PA sent through Pending sale to [facility] For Dupixent for EOE    Will need Dupixent my way completed as well/ pt edu

## 2024-11-12 ENCOUNTER — TELEPHONE (OUTPATIENT)
Dept: GASTROENTEROLOGY | Facility: CLINIC | Age: 29
End: 2024-11-12

## 2024-11-12 ENCOUNTER — PATIENT MESSAGE (OUTPATIENT)
Dept: GASTROENTEROLOGY | Facility: CLINIC | Age: 29
End: 2024-11-12
Payer: COMMERCIAL

## 2024-11-12 ENCOUNTER — OFFICE VISIT (OUTPATIENT)
Dept: GASTROENTEROLOGY | Facility: CLINIC | Age: 29
End: 2024-11-12
Payer: COMMERCIAL

## 2024-11-12 VITALS
SYSTOLIC BLOOD PRESSURE: 112 MMHG | WEIGHT: 147.2 LBS | BODY MASS INDEX: 31.76 KG/M2 | HEIGHT: 57 IN | DIASTOLIC BLOOD PRESSURE: 72 MMHG

## 2024-11-12 DIAGNOSIS — R13.19 ESOPHAGEAL DYSPHAGIA: ICD-10-CM

## 2024-11-12 DIAGNOSIS — K20.0 EOSINOPHILIC ESOPHAGITIS: Primary | ICD-10-CM

## 2024-11-12 DIAGNOSIS — K58.1 IRRITABLE BOWEL SYNDROME WITH CONSTIPATION: ICD-10-CM

## 2024-11-12 DIAGNOSIS — K21.00 GASTROESOPHAGEAL REFLUX DISEASE WITH ESOPHAGITIS WITHOUT HEMORRHAGE: ICD-10-CM

## 2024-11-12 RX ORDER — VONOPRAZAN FUMARATE 26.72 MG/1
20 TABLET ORAL DAILY
Qty: 90 TABLET | Refills: 1 | Status: SHIPPED | OUTPATIENT
Start: 2024-11-12

## 2024-11-12 RX ORDER — PLECANATIDE 3 MG/1
3 TABLET ORAL DAILY
Qty: 30 TABLET | Refills: 5 | Status: SHIPPED | OUTPATIENT
Start: 2024-11-12

## 2024-11-12 NOTE — PROGRESS NOTES
Chief Complaint   Patient presents with    Eosinophilic esophagitis        Patient is a 29 y.o. who presents to the office for follow-up after undergoing EGD evaluation on 10/30/2024 with Dr. Denny.  Last in office visit on 2024 patient has a significant past medical history of Anibal with last in office visit greater than 1 year ago.  patient has a significant past medical history of anemia, anxiety, hydradenitis suppurativa, and migraines.      Past Surgical History:  2018    2018 tubal ligation     Social History:   Former tobacco user    Denies e-cigarrette,  alcohol or drug use      10/30/2024 EGD:  Mucosal changes within the esophagus characterized by longitudinal furrows involving proximal, mid, and distal esophagus suspicious for EOE  Distal esophageal sampling: Increased intraepithelial eosinophils measuring up to 4 per high-powered field  Proximal sampling: Increased eosinophils measuring up to 25 per hpf  Gastritis involving gastric body and antrum characterized by moderate inflammation with erosions and linear erosions  Endoscopically normal-appearing duodenum  LA grade B esophagitis  History of Present Illness    Patient reports understanding of previous diagnosis of EOE after EGD evaluation with Dr. Barnett revealed similar findings.  Since this time she has been on a PPI and trialed a inhaled corticosteroid without clinical improvement in symptoms.  Inhaled corticosteroid was later discontinued secondary to absence of response and concern for potential side effects.    She experiences difficulty swallowing and had a distressing choking incident while driving that resolved without further intervention or hospital admission. She has not yet started taking Voquenza  due to insurance issues and unavailability at the pharmacy.     Denies prior trial of Dupixent.  While awaiting Voquenza she continues on omeprazole 40 mg once daily.  Denies heartburn, nausea, or vomiting.    She reports  having a bowel movement yesterday, which took almost an hour to pass despite taking Linzess 290. During this episode, she experienced stomach cramps, sweating, and nausea. Denies recurrence.      Prior to this, she had not had a bowel movement for about a week, but she was not overly concerned as she has previously gone up to 2 weeks without a bowel movement. She reports no presence of blood in her stool and has not noticed any unintentional weight loss. In fact, she has gained weight.     Result Review :      Office Visit with Jessy Gr APRN (09/24/2024)   Office Visit with Maribel Estes APRN (09/12/2024)   US Liver (05/07/2024 14:35) - for elevated LFTs  Cholelithiasis without evidence of acute cholecystitis  Upper GI Endoscopy (10/30/2024 14:26)   reviewed and updated with patient    reviewed and updated with patient  Outpatient Medications Marked as Taking for the 11/12/24 encounter (Office Visit) with Jessy Gr APRN   Medication Sig Dispense Refill    busPIRone (BUSPAR) 5 MG tablet Take 1 tablet by mouth Every Night. May also take 1 tablet 2 (Two) Times a Day As Needed (anxiety). 90 tablet 1    ferrous gluconate 324 (37.5 Fe) MG tablet tablet Take 1 tablet by mouth Daily With Breakfast. 90 tablet 1    ketorolac (TORADOL) 10 MG tablet Take 1 tablet by mouth Every 6 (Six) Hours As Needed (Migraine headache). 15 tablet 5    ondansetron ODT (ZOFRAN-ODT) 4 MG disintegrating tablet Place 1 tablet on the tongue Every 6 (Six) Hours As Needed for Nausea or Vomiting. 20 tablet 5    promethazine (PHENERGAN) 25 MG tablet Take one tablet by mouth every 6 hours as needed for nausea 20 tablet 5    rOPINIRole (REQUIP) 0.5 MG tablet TAKE 1 TABLET BY MOUTH IN THE EVENING ONE HOUR BEFORE BEDTIME 30 tablet 0    sertraline (ZOLOFT) 100 MG tablet Take 1 tablet by mouth Daily. 90 tablet 1    SUMAtriptan (Imitrex) 50 MG tablet Take one tablet at onset of headache. May repeat dose one time in 2 hours if headache  "not relieved. 9 tablet 3    topiramate (TOPAMAX) 50 MG tablet Take 1 tablet by mouth 2 (Two) Times a Day. 180 tablet 1    vitamin D (ERGOCALCIFEROL) 1.25 MG (81293 UT) capsule capsule Take 1 capsule by mouth 2 (Two) Times a Week. 30 capsule 3    Vonoprazan Fumarate (Voquezna) 20 MG tablet Take 1 tablet by mouth Daily. 90 tablet 1    [DISCONTINUED] linaclotide (Linzess) 290 MCG capsule capsule Take 1 capsule by mouth Every Morning Before Breakfast. 90 capsule 3     Vital Signs:   /72 (BP Location: Left arm, Patient Position: Sitting, Cuff Size: Large Adult)   Ht 144.8 cm (57.01\")   Wt 66.8 kg (147 lb 3.2 oz)   BMI 31.84 kg/m²     Body mass index is 31.84 kg/m².     Physical Exam  Vitals reviewed.   Constitutional:       General: She is not in acute distress.     Appearance: Normal appearance. She is not ill-appearing.   Eyes:      General: No scleral icterus.  Pulmonary:      Effort: Pulmonary effort is normal. No respiratory distress.   Abdominal:      General: Bowel sounds are normal. There is no distension.      Palpations: Abdomen is soft. Abdomen is not rigid. There is no mass or pulsatile mass.      Tenderness: There is no abdominal tenderness. There is no guarding or rebound.      Hernia: No hernia is present.   Skin:     Coloration: Skin is not jaundiced.   Neurological:      Mental Status: She is alert and oriented to person, place, and time.   Psychiatric:         Thought Content: Thought content normal.         Judgment: Judgment normal.       Assessment and Plan    Diagnoses and all orders for this visit:    1. Eosinophilic esophagitis (Primary)  -     Vonoprazan Fumarate (Voquezna) 20 MG tablet; Take 1 tablet by mouth Daily.  Dispense: 90 tablet; Refill: 1    2. Esophageal dysphagia    3. Gastroesophageal reflux disease with esophagitis without hemorrhage  -     Vonoprazan Fumarate (Voquezna) 20 MG tablet; Take 1 tablet by mouth Daily.  Dispense: 90 tablet; Refill: 1    4. Irritable bowel " syndrome with constipation  -     Plecanatide (Trulance) 3 MG tablet; Take 1 tablet by mouth Daily.  Dispense: 30 tablet; Refill: 5         Assessment & Plan  1. Eosinophilic esophagitis.  We reviewed September 2020 for endoscopic findings at length as well as findings consistent with EOE.  The presence of eosinophils in her esophagus is causing a narrowing. Previous trial of high dose PPI and swallowed corticosteroids have been unsuccessful in controlling EOE symptoms.    She has not yet started Voquenza now due to insurance issues, but it has now been approved.   A prescription for Voquenza will be sent to a mail order pharmacy.  She is agreeable to starting Dupixent 300 mg weekly injections reviewed administration and common adverse effects to treatment plan including importance of alternating sites to minimize the risk of injection site reactions.   Patient verbalizes family member who also utilizes Dupixent and reports familiarity with administration.  Once medication is received she will contact our office for additional recommendations as well as review administration practices.  Nursing staff will also provide administration education today prior to conclusion of visit.     2. Irritable bowel syndrome with constipation.  Her symptoms align with the Hammond IV criteria for IBS with constipation, including infrequent bowel movements and associated pain.   She has not responded to Linzess 290 mcg.   A prescription for Trulance 3 mg will be sent, to be taken once daily in the morning, with or without food. She is advised to continue her fiber intake.    Follow-up  Return in 12 weeks for follow up.    Patient is agreeable to the outlined above treatment plan.  Verbalizes understanding and will contact office for any new or worsening concerns.  All questions answered and support provided.    Patient Instructions     For Heartburn:  Prescription for Voquenza has been sent to [facility] for you to take 20 mg  once daily with or without meals x 8 weeks  After 8 weeks, you will begin taking 10 mg once daily without or without food to help with heartburn symptoms/heal upper GI irritation   Once [facility] has received your prescription, a team member from their pharmacy will contact you to discuss pricing as well as schedule home delivery at no cost to your home.  [facility] contact info:  [facility] :[address], [address] [place] [address]  Phone: [phone]  Fax: [phone]     For Constipation:   Stop the Linzess 290 mcg and Start Trulance 3 mg tablet, 1 pill daily.  This can be taken with or without food.    For constipation, it may take trying several different medications to find the right medication regimen to help regulate your bowel movements.      The goal for treatment with constipation is to find the best medication to promote more regular bowel movements with complete evacuation.  Side effects of medications for constipation include nausea, abdominal pain, headache, diarrhea, cramping and in rare cases severe diarrhea.  The symptoms may be present when you first start the medication and then improve after several doses or symptoms may persist and become intolerable.  If the medication provided causes prolonged and intolerable side effects, discontinue and contact the office.      If you were given samples of this medication, please contact the office with an update and if the medication provides improvement in constipation and is tolerated, we will send in prescription to your pharmacy for regular use.  If the medication is not tolerated, we can try different dosages or different medications for constipation.       EMR Dragon/Transcription Disclaimer:  This document has been Dictated utilizing Dragon dictation.     Patient or patient representative verbalized consent for the use of Ambient Listening during the visit with  SARITA Soliz for chart documentation. 11/12/2024  11:22  EST

## 2024-11-12 NOTE — PATIENT INSTRUCTIONS
For Heartburn:  Prescription for Voquenza has been sent to [facility] pharmacy for you to take 20 mg once daily with or without meals x 8 weeks  After 8 weeks, you will begin taking 10 mg once daily without or without food to help with heartburn symptoms/heal upper GI irritation   Once [facility] has received your prescription, a team member from their pharmacy will contact you to discuss pricing as well as schedule home delivery at no cost to your home.  [facility] contact info:  [facility] :[address], [address] [place] [address]  Phone: [phone]  Fax: [phone]     For Constipation:   Stop the Linzess 290 mcg and Start Trulance 3 mg tablet, 1 pill daily.  This can be taken with or without food.    For constipation, it may take trying several different medications to find the right medication regimen to help regulate your bowel movements.      The goal for treatment with constipation is to find the best medication to promote more regular bowel movements with complete evacuation.  Side effects of medications for constipation include nausea, abdominal pain, headache, diarrhea, cramping and in rare cases severe diarrhea.  The symptoms may be present when you first start the medication and then improve after several doses or symptoms may persist and become intolerable.  If the medication provided causes prolonged and intolerable side effects, discontinue and contact the office.      If you were given samples of this medication, please contact the office with an update and if the medication provides improvement in constipation and is tolerated, we will send in prescription to your pharmacy for regular use.  If the medication is not tolerated, we can try different dosages or different medications for constipation.

## 2024-11-12 NOTE — TELEPHONE ENCOUNTER
Patient advised in office plan of care and expectations of initiating Dupixent for EOE.   Patient education and demonstration on administering Dupixent via SUB Q. Patient demonstrated proper administration using training pen.   Informed patient education and indications for Dupixent 300 mg/2ml weekly.  Reviewed plan of care and process of enrolling into Dupixent [employer] program.    (Jessy Gr has ordered for you to start Dupixent 300mg/2ml injections subcutaneous weekly to treat your Eosinophilic Esophagitis. You will be enrolled into Dupixent [employer] patient program. This is a support program designed to help you get access to Dupixent and stay on track while providing helpful tools and resources. Once completing patient enrollment forms the nurse will get authorization from your insurance for medication. Once enrolled in DUPIXENT [employer], you can get help navigating any required prior authorization (PA) processes or scheduling deliveries of your prescription. A specialty pharmacy will work with you to schedule the shipments of DUPIXENT to your home or preferred location. )    Patient signed enrollment forms and faxed to [phone]

## 2024-11-25 DIAGNOSIS — K20.0 EOSINOPHILIC ESOPHAGITIS: ICD-10-CM

## 2024-11-25 RX ORDER — DUPILUMAB 300 MG/2ML
300 INJECTION, SOLUTION SUBCUTANEOUS WEEKLY
Qty: 4 ML | Refills: 11 | Status: SHIPPED | OUTPATIENT
Start: 2024-11-25

## 2024-11-26 ENCOUNTER — OFFICE VISIT (OUTPATIENT)
Dept: INTERNAL MEDICINE | Facility: CLINIC | Age: 29
End: 2024-11-26
Payer: COMMERCIAL

## 2024-11-26 VITALS
TEMPERATURE: 98.7 F | WEIGHT: 149 LBS | SYSTOLIC BLOOD PRESSURE: 128 MMHG | OXYGEN SATURATION: 98 % | DIASTOLIC BLOOD PRESSURE: 84 MMHG | HEART RATE: 102 BPM | BODY MASS INDEX: 32.15 KG/M2 | HEIGHT: 57 IN

## 2024-11-26 DIAGNOSIS — G43.111 INTRACTABLE MIGRAINE WITH AURA WITH STATUS MIGRAINOSUS: ICD-10-CM

## 2024-11-26 PROCEDURE — 1125F AMNT PAIN NOTED PAIN PRSNT: CPT | Performed by: NURSE PRACTITIONER

## 2024-11-26 PROCEDURE — 1160F RVW MEDS BY RX/DR IN RCRD: CPT | Performed by: NURSE PRACTITIONER

## 2024-11-26 PROCEDURE — 99214 OFFICE O/P EST MOD 30 MIN: CPT | Performed by: NURSE PRACTITIONER

## 2024-11-26 PROCEDURE — 1159F MED LIST DOCD IN RCRD: CPT | Performed by: NURSE PRACTITIONER

## 2024-11-26 RX ORDER — SUMATRIPTAN SUCCINATE 100 MG/1
TABLET ORAL
Qty: 10 TABLET | Refills: 5 | Status: SHIPPED | OUTPATIENT
Start: 2024-11-26

## 2024-11-26 NOTE — ASSESSMENT & PLAN NOTE
Headaches are exacerbated.    Plan:  Plan dosage change to the following medication/s;  increase imitrex to 100mg.     Discussed medication dosage, use, side effects, and goals of treatment in detail.    Discussed monitoring symptoms and use of quick-relief medications and maintenance medication.    General Treatment Goals:   symptom prevention  minimize work absence  minimizing limitation in activity  prevention of exacerbations  decrease use of ER/inpatient care  minimization of adverse effects of treatment    Followup January                Orders:    SUMAtriptan (Imitrex) 100 MG tablet; Take one tablet at onset of headache. May repeat dose one time in 2 hours if headache not relieved.

## 2024-11-26 NOTE — PROGRESS NOTES
Chief Complaint   Patient presents with    Migraine     Has had an increase in her migraines. Has been having about about 4 migraines a month but they are lasting longer. Mentions she has had 2 migraines within the last month that have lasted about 4 days. Mentions that it is really tender on both of her temples. Also mentions having some slight blurred vision in her right eye during the attack        SUBJECTIVE  Eugenia Le is a 29 y.o. female presenting today for follow up of her chronic health conditions.    Migraines - notes 2 in the last month that lasting for 4 days w/o relief. This was 3wks ago. Visual disturbance of R eye. Pain in bilat temples. Pain w/ cervical ROM. +n/v. No identifiable trigger and avoiding all of her usual triggers. No a/w weather change or menstrual cycle.      Outpatient Medications Marked as Taking for the 11/26/24 encounter (Office Visit) with Maribel Estes APRN   Medication Sig Dispense Refill    busPIRone (BUSPAR) 5 MG tablet Take 1 tablet by mouth Every Night. May also take 1 tablet 2 (Two) Times a Day As Needed (anxiety). 90 tablet 1    Dupilumab (Dupixent) 300 MG/2ML solution prefilled syringe Inject 2 mL under the skin into the appropriate area as directed 1 (One) Time Per Week. 4 mL 11    ferrous gluconate 324 (37.5 Fe) MG tablet tablet Take 1 tablet by mouth Daily With Breakfast. 90 tablet 1    ketorolac (TORADOL) 10 MG tablet Take 1 tablet by mouth Every 6 (Six) Hours As Needed (Migraine headache). 15 tablet 5    ondansetron ODT (ZOFRAN-ODT) 4 MG disintegrating tablet Place 1 tablet on the tongue Every 6 (Six) Hours As Needed for Nausea or Vomiting. 20 tablet 5    promethazine (PHENERGAN) 25 MG tablet Take one tablet by mouth every 6 hours as needed for nausea 20 tablet 5    rOPINIRole (REQUIP) 0.5 MG tablet TAKE 1 TABLET BY MOUTH IN THE EVENING ONE HOUR BEFORE BEDTIME 30 tablet 0    sertraline (ZOLOFT) 100 MG tablet Take 1 tablet by mouth Daily. 90 tablet 1     "SUMAtriptan (Imitrex) 50 MG tablet Take one tablet at onset of headache. May repeat dose one time in 2 hours if headache not relieved. 9 tablet 3    topiramate (TOPAMAX) 50 MG tablet Take 1 tablet by mouth 2 (Two) Times a Day. 180 tablet 1    vitamin D (ERGOCALCIFEROL) 1.25 MG (13475 UT) capsule capsule Take 1 capsule by mouth 2 (Two) Times a Week. 30 capsule 3    Vonoprazan Fumarate (Voquezna) 20 MG tablet Take 1 tablet by mouth Daily. 90 tablet 1         The following portions of the patient's history were reviewed and updated as appropriate: allergies, current medications, past family history, past medical history, past social history, past surgical history and problem list.        Objective   Vitals:    11/26/24 1457   BP: 128/84   BP Location: Left arm   Patient Position: Sitting   Cuff Size: Adult   Pulse: 102   Temp: 98.7 °F (37.1 °C)   TempSrc: Infrared   SpO2: 98%   Weight: 67.6 kg (149 lb)   Height: 144.8 cm (57.01\")       BP Readings from Last 3 Encounters:   11/26/24 128/84   11/12/24 112/72   10/30/24 105/69        Wt Readings from Last 3 Encounters:   11/26/24 67.6 kg (149 lb)   11/12/24 66.8 kg (147 lb 3.2 oz)   10/30/24 64.2 kg (141 lb 9.6 oz)        Body mass index is 32.23 kg/m².  Nursing notes and vitals reviewed.    Physical Exam  Constitutional:       General: She is not in acute distress.     Appearance: She is well-developed.   HENT:      Right Ear: Hearing, tympanic membrane, ear canal and external ear normal.      Left Ear: Hearing, tympanic membrane, ear canal and external ear normal.      Nose: Nose normal.      Mouth/Throat:      Mouth: Mucous membranes are moist.      Pharynx: Oropharynx is clear. Uvula midline.   Neck:      Thyroid: No thyroid mass or thyromegaly.   Cardiovascular:      Rate and Rhythm: Regular rhythm.      Pulses: Normal pulses.      Heart sounds: S1 normal and S2 normal. No murmur heard.     No friction rub. No gallop.   Pulmonary:      Effort: Pulmonary effort is " normal.      Breath sounds: Normal breath sounds. No wheezing, rhonchi or rales.   Musculoskeletal:      Cervical back: Neck supple.   Lymphadenopathy:      Cervical: No cervical adenopathy.   Neurological:      General: No focal deficit present.      Mental Status: She is alert and oriented to person, place, and time.      Cranial Nerves: No cranial nerve deficit.      Sensory: No sensory deficit.      Motor: Motor function is intact.      Coordination: Coordination is intact.      Gait: Gait is intact.      Deep Tendon Reflexes: Reflexes are normal and symmetric.   Psychiatric:         Attention and Perception: She is attentive.         Speech: Speech normal.         Behavior: Behavior normal.           Data Reviewed:        Assessment & Plan     Assessment & Plan  Intractable migraine with aura with status migrainosus  Headaches are  exacerbated .    Plan:  Plan dosage change to the following medication/s;  increase imitrex to 100mg.     Discussed medication dosage, use, side effects, and goals of treatment in detail.    Discussed monitoring symptoms and use of quick-relief medications and maintenance medication.    General Treatment Goals:   symptom prevention  minimize work absence  minimizing limitation in activity  prevention of exacerbations  decrease use of ER/inpatient care  minimization of adverse effects of treatment    Followup  January                Orders:    SUMAtriptan (Imitrex) 100 MG tablet; Take one tablet at onset of headache. May repeat dose one time in 2 hours if headache not relieved.          Medications, including side effects, were discussed with the patient. Patient verbalized understanding.  The plan of care was discussed. All questions were answered. Patient verbalized understanding.      Return if symptoms worsen or fail to improve.

## 2024-12-03 DIAGNOSIS — K21.00 GASTROESOPHAGEAL REFLUX DISEASE WITH ESOPHAGITIS WITHOUT HEMORRHAGE: ICD-10-CM

## 2024-12-03 RX ORDER — OMEPRAZOLE 40 MG/1
40 CAPSULE, DELAYED RELEASE ORAL
Qty: 90 CAPSULE | Refills: 0 | OUTPATIENT
Start: 2024-12-03

## 2024-12-04 ENCOUNTER — TELEPHONE (OUTPATIENT)
Dept: INTERNAL MEDICINE | Facility: CLINIC | Age: 29
End: 2024-12-04
Payer: COMMERCIAL

## 2024-12-05 DIAGNOSIS — E78.00 HYPERCHOLESTEROLEMIA: ICD-10-CM

## 2024-12-05 DIAGNOSIS — E55.9 VITAMIN D DEFICIENCY: Primary | ICD-10-CM

## 2024-12-05 DIAGNOSIS — D50.8 IRON DEFICIENCY ANEMIA SECONDARY TO INADEQUATE DIETARY IRON INTAKE: ICD-10-CM

## 2024-12-09 ENCOUNTER — TELEPHONE (OUTPATIENT)
Dept: GASTROENTEROLOGY | Facility: CLINIC | Age: 29
End: 2024-12-09
Payer: COMMERCIAL

## 2024-12-21 DIAGNOSIS — K20.0 EOSINOPHILIC ESOPHAGITIS: ICD-10-CM

## 2024-12-23 RX ORDER — DUPILUMAB 300 MG/2ML
300 INJECTION, SOLUTION SUBCUTANEOUS WEEKLY
Qty: 4 ML | Refills: 11 | Status: SHIPPED | OUTPATIENT
Start: 2024-12-23

## 2025-01-01 DIAGNOSIS — G43.109 MIGRAINE WITH AURA AND WITHOUT STATUS MIGRAINOSUS, NOT INTRACTABLE: ICD-10-CM

## 2025-01-02 RX ORDER — KETOROLAC TROMETHAMINE 10 MG/1
10 TABLET, FILM COATED ORAL EVERY 6 HOURS PRN
Qty: 15 TABLET | Refills: 5 | Status: SHIPPED | OUTPATIENT
Start: 2025-01-02

## 2025-01-02 NOTE — TELEPHONE ENCOUNTER
HGB greater than 10 or HCT greater than 30 in past 9 months   Rx Refill Note  Requested Prescriptions     Pending Prescriptions Disp Refills    ketorolac (TORADOL) 10 MG tablet 15 tablet 5     Sig: Take 1 tablet by mouth Every 6 (Six) Hours As Needed (Migraine headache).      Last office visit with prescribing clinician: 11/26/2024   Last telemedicine visit with prescribing clinician: Visit date not found   Next office visit with prescribing clinician: 1/13/2025                         Would you like a call back once the refill request has been completed: [] Yes [] No    If the office needs to give you a call back, can they leave a voicemail: [] Yes [] No    Augustus Garcia MA  01/02/25, 10:00 EST

## 2025-01-07 DIAGNOSIS — G43.111 INTRACTABLE MIGRAINE WITH AURA WITH STATUS MIGRAINOSUS: ICD-10-CM

## 2025-01-07 RX ORDER — SUMATRIPTAN SUCCINATE 100 MG/1
TABLET ORAL
Qty: 10 TABLET | Refills: 5 | Status: SHIPPED | OUTPATIENT
Start: 2025-01-07

## 2025-01-07 NOTE — TELEPHONE ENCOUNTER
Rx Refill Note  Requested Prescriptions     Pending Prescriptions Disp Refills    SUMAtriptan (Imitrex) 100 MG tablet 10 tablet 5     Sig: Take one tablet at onset of headache. May repeat dose one time in 2 hours if headache not relieved.      Last office visit with prescribing clinician: 11/26/2024   Last telemedicine visit with prescribing clinician: Visit date not found   Next office visit with prescribing clinician: 1/13/2025                         Would you like a call back once the refill request has been completed: [] Yes [] No    If the office needs to give you a call back, can they leave a voicemail: [] Yes [] No    Augustus Garcia MA  01/07/25, 14:38 EST

## 2025-01-12 DIAGNOSIS — K20.0 EOSINOPHILIC ESOPHAGITIS: ICD-10-CM

## 2025-01-13 RX ORDER — DUPILUMAB 300 MG/2ML
300 INJECTION, SOLUTION SUBCUTANEOUS WEEKLY
Qty: 4 ML | Refills: 11 | Status: SHIPPED | OUTPATIENT
Start: 2025-01-13

## 2025-01-23 DIAGNOSIS — G43.111 INTRACTABLE MIGRAINE WITH AURA WITH STATUS MIGRAINOSUS: ICD-10-CM

## 2025-01-23 DIAGNOSIS — G43.109 MIGRAINE WITH AURA AND WITHOUT STATUS MIGRAINOSUS, NOT INTRACTABLE: ICD-10-CM

## 2025-01-23 DIAGNOSIS — G43.809 OTHER MIGRAINE WITHOUT STATUS MIGRAINOSUS, NOT INTRACTABLE: ICD-10-CM

## 2025-01-23 RX ORDER — TOPIRAMATE 50 MG/1
50 TABLET, FILM COATED ORAL 2 TIMES DAILY
Qty: 180 TABLET | Refills: 1 | Status: SHIPPED | OUTPATIENT
Start: 2025-01-23

## 2025-01-23 RX ORDER — SUMATRIPTAN SUCCINATE 100 MG/1
TABLET ORAL
Qty: 10 TABLET | Refills: 5 | Status: SHIPPED | OUTPATIENT
Start: 2025-01-23

## 2025-01-23 RX ORDER — ONDANSETRON 4 MG/1
4 TABLET, ORALLY DISINTEGRATING ORAL EVERY 6 HOURS PRN
Qty: 20 TABLET | Refills: 5 | Status: SHIPPED | OUTPATIENT
Start: 2025-01-23

## 2025-01-31 DIAGNOSIS — G43.809 OTHER MIGRAINE WITHOUT STATUS MIGRAINOSUS, NOT INTRACTABLE: ICD-10-CM

## 2025-01-31 RX ORDER — PROMETHAZINE HYDROCHLORIDE 25 MG/1
TABLET ORAL
Qty: 20 TABLET | Refills: 5 | Status: SHIPPED | OUTPATIENT
Start: 2025-01-31

## 2025-02-03 ENCOUNTER — OFFICE VISIT (OUTPATIENT)
Dept: INTERNAL MEDICINE | Facility: CLINIC | Age: 30
End: 2025-02-03
Payer: COMMERCIAL

## 2025-02-03 VITALS
HEART RATE: 82 BPM | BODY MASS INDEX: 33.06 KG/M2 | OXYGEN SATURATION: 98 % | DIASTOLIC BLOOD PRESSURE: 80 MMHG | WEIGHT: 152.85 LBS | TEMPERATURE: 98 F | SYSTOLIC BLOOD PRESSURE: 118 MMHG

## 2025-02-03 DIAGNOSIS — E55.9 VITAMIN D DEFICIENCY: ICD-10-CM

## 2025-02-03 DIAGNOSIS — G25.81 RLS (RESTLESS LEGS SYNDROME): ICD-10-CM

## 2025-02-03 DIAGNOSIS — D50.8 IRON DEFICIENCY ANEMIA SECONDARY TO INADEQUATE DIETARY IRON INTAKE: ICD-10-CM

## 2025-02-03 DIAGNOSIS — G43.809 OTHER MIGRAINE WITHOUT STATUS MIGRAINOSUS, NOT INTRACTABLE: Primary | ICD-10-CM

## 2025-02-03 DIAGNOSIS — F41.1 GAD (GENERALIZED ANXIETY DISORDER): ICD-10-CM

## 2025-02-03 DIAGNOSIS — F33.1 MODERATE EPISODE OF RECURRENT MAJOR DEPRESSIVE DISORDER: ICD-10-CM

## 2025-02-03 DIAGNOSIS — F42.2 MIXED OBSESSIONAL THOUGHTS AND ACTS: ICD-10-CM

## 2025-02-03 PROCEDURE — 1160F RVW MEDS BY RX/DR IN RCRD: CPT | Performed by: NURSE PRACTITIONER

## 2025-02-03 PROCEDURE — 99214 OFFICE O/P EST MOD 30 MIN: CPT | Performed by: NURSE PRACTITIONER

## 2025-02-03 PROCEDURE — 1125F AMNT PAIN NOTED PAIN PRSNT: CPT | Performed by: NURSE PRACTITIONER

## 2025-02-03 PROCEDURE — 1159F MED LIST DOCD IN RCRD: CPT | Performed by: NURSE PRACTITIONER

## 2025-02-03 RX ORDER — MUPIROCIN 20 MG/G
1 OINTMENT TOPICAL 3 TIMES DAILY
COMMUNITY
Start: 2025-01-29

## 2025-02-03 RX ORDER — CEPHALEXIN 500 MG/1
500 CAPSULE ORAL 3 TIMES DAILY
COMMUNITY
Start: 2025-01-29 | End: 2025-02-05

## 2025-02-03 RX ORDER — IBUPROFEN 600 MG/1
600 TABLET, FILM COATED ORAL
COMMUNITY
Start: 2025-01-29 | End: 2025-02-10

## 2025-02-03 RX ORDER — FAMOTIDINE 20 MG/1
20 TABLET, FILM COATED ORAL
COMMUNITY
Start: 2024-12-08 | End: 2025-02-10

## 2025-02-03 NOTE — PROGRESS NOTES
Chief Complaint   Patient presents with    Anxiety     Med review and lab work review       SUBJECTIVE  Eugenia Le is a 29 y.o. female presenting today for follow up of her chronic health conditions.      Outpatient Medications Marked as Taking for the 2/3/25 encounter (Office Visit) with Maribel Estes APRN   Medication Sig Dispense Refill    busPIRone (BUSPAR) 5 MG tablet Take 1 tablet by mouth Every Night. May also take 1 tablet 2 (Two) Times a Day As Needed (anxiety). 90 tablet 1    cephalexin (KEFLEX) 500 MG capsule Take 1 capsule by mouth 3 (Three) Times a Day.      Dupilumab (Dupixent) 300 MG/2ML solution prefilled syringe Inject 2 mL under the skin into the appropriate area as directed 1 (One) Time Per Week. 4 mL 11    famotidine (PEPCID) 20 MG tablet Take 1 tablet by mouth.      ferrous gluconate 324 (37.5 Fe) MG tablet tablet Take 1 tablet by mouth Daily With Breakfast. 90 tablet 1    ibuprofen (ADVIL,MOTRIN) 600 MG tablet Take 1 tablet by mouth.      ketorolac (TORADOL) 10 MG tablet Take 1 tablet by mouth Every 6 (Six) Hours As Needed (Migraine headache). 15 tablet 5    mupirocin (BACTROBAN) 2 % ointment Apply 1 Application topically to the appropriate area as directed 3 (Three) Times a Day.      ondansetron ODT (ZOFRAN-ODT) 4 MG disintegrating tablet Place 1 tablet on the tongue Every 6 (Six) Hours As Needed for Nausea or Vomiting. 20 tablet 5    promethazine (PHENERGAN) 25 MG tablet Take one tablet by mouth every 6 hours as needed for nausea 20 tablet 5    rOPINIRole (REQUIP) 0.5 MG tablet TAKE 1 TABLET BY MOUTH IN THE EVENING ONE HOUR BEFORE BEDTIME 30 tablet 0    sertraline (ZOLOFT) 100 MG tablet Take 1 tablet by mouth Daily. 90 tablet 1    SUMAtriptan (Imitrex) 100 MG tablet Take one tablet at onset of headache. May repeat dose one time in 2 hours if headache not relieved. 10 tablet 5    topiramate (TOPAMAX) 50 MG tablet Take 1 tablet by mouth 2 (Two) Times a Day. 180 tablet 1    VITAMIN D  PO Take  by mouth. Buys OTC      Vonoprazan Fumarate (Voquezna) 20 MG tablet Take 1 tablet by mouth Daily. 90 tablet 1         The following portions of the patient's history were reviewed and updated as appropriate: allergies, current medications, past family history, past medical history, past social history, past surgical history and problem list.    Review of Systems   Constitutional:  Negative for chills, diaphoresis, fatigue and fever.   HENT:  Negative for congestion, sore throat and swollen glands.    Respiratory:  Negative for cough.    Cardiovascular:  Negative for chest pain.   Gastrointestinal:  Negative for abdominal pain, nausea and vomiting.   Genitourinary:  Negative for dysuria.   Musculoskeletal:  Negative for myalgias and neck pain.   Skin:  Negative for rash.   Neurological:  Negative for weakness and numbness.       Objective   Vitals:    02/03/25 1323   BP: 118/80   BP Location: Left arm   Patient Position: Sitting   Cuff Size: Adult   Pulse: 82   Temp: 98 °F (36.7 °C)   TempSrc: Infrared   SpO2: 98%   Weight: 69.3 kg (152 lb 13.6 oz)       BP Readings from Last 3 Encounters:   02/03/25 118/80   11/26/24 128/84   11/12/24 112/72        Wt Readings from Last 3 Encounters:   02/03/25 69.3 kg (152 lb 13.6 oz)   11/26/24 67.6 kg (149 lb)   11/12/24 66.8 kg (147 lb 3.2 oz)        Body mass index is 33.06 kg/m².  Nursing notes and vitals reviewed.    Physical Exam  Constitutional:       General: She is not in acute distress.     Appearance: She is well-developed.   Cardiovascular:      Rate and Rhythm: Regular rhythm.      Heart sounds: S1 normal and S2 normal.   Pulmonary:      Effort: Pulmonary effort is normal.      Breath sounds: Normal breath sounds.   Neurological:      Mental Status: She is alert and oriented to person, place, and time.   Psychiatric:         Attention and Perception: She is attentive.         Behavior: Behavior normal.         Thought Content: Thought content normal.            Data Reviewed:  Recent Results (from the past 4 weeks)   CBC & Differential    Collection Time: 01/24/25  8:05 AM    Specimen: Blood   Result Value Ref Range    WBC 7.0 3.4 - 10.8 x10E3/uL    RBC 4.54 3.77 - 5.28 x10E6/uL    Hemoglobin 13.7 11.1 - 15.9 g/dL    Hematocrit 41.9 34.0 - 46.6 %    MCV 92 79 - 97 fL    MCH 30.2 26.6 - 33.0 pg    MCHC 32.7 31.5 - 35.7 g/dL    RDW 11.9 11.7 - 15.4 %    Platelets 278 150 - 450 x10E3/uL    Neutrophil Rel % 60 Not Estab. %    Lymphocyte Rel % 29 Not Estab. %    Monocyte Rel % 8 Not Estab. %    Eosinophil Rel % 2 Not Estab. %    Basophil Rel % 1 Not Estab. %    Neutrophils Absolute 4.2 1.4 - 7.0 x10E3/uL    Lymphocytes Absolute 2.0 0.7 - 3.1 x10E3/uL    Monocytes Absolute 0.6 0.1 - 0.9 x10E3/uL    Eosinophils Absolute 0.2 0.0 - 0.4 x10E3/uL    Basophils Absolute 0.0 0.0 - 0.2 x10E3/uL    Immature Granulocyte Rel % 0 Not Estab. %    Immature Grans Absolute 0.0 0.0 - 0.1 x10E3/uL   Comprehensive Metabolic Panel    Collection Time: 01/24/25  8:05 AM    Specimen: Blood   Result Value Ref Range    Glucose 74 70 - 99 mg/dL    BUN 8 6 - 20 mg/dL    Creatinine 0.88 0.57 - 1.00 mg/dL    EGFR Result 91 >59 mL/min/1.73    BUN/Creatinine Ratio 9 9 - 23    Sodium 138 134 - 144 mmol/L    Potassium 4.1 3.5 - 5.2 mmol/L    Chloride 102 96 - 106 mmol/L    Total CO2 22 20 - 29 mmol/L    Calcium 10.0 8.7 - 10.2 mg/dL    Total Protein 7.7 6.0 - 8.5 g/dL    Albumin 4.7 4.0 - 5.0 g/dL    Globulin 3.0 1.5 - 4.5 g/dL    Total Bilirubin 0.4 0.0 - 1.2 mg/dL    Alkaline Phosphatase 90 44 - 121 IU/L    AST (SGOT) 54 (H) 0 - 40 IU/L    ALT (SGPT) 70 (H) 0 - 32 IU/L   Ferritin    Collection Time: 01/24/25  8:05 AM    Specimen: Blood   Result Value Ref Range    Ferritin 43 15 - 150 ng/mL   Folate    Collection Time: 01/24/25  8:05 AM    Specimen: Blood   Result Value Ref Range    Folate 3.3 >3.0 ng/mL   Iron Profile    Collection Time: 01/24/25  8:05 AM    Specimen: Blood   Result Value Ref Range     TIBC 333 250 - 450 ug/dL    UIBC 219 131 - 425 ug/dL    Iron 114 27 - 159 ug/dL    Iron Saturation 34 15 - 55 %   Lipid Panel With / Chol / HDL Ratio    Collection Time: 01/24/25  8:05 AM    Specimen: Blood   Result Value Ref Range    Total Cholesterol 193 100 - 199 mg/dL    Triglycerides 111 0 - 149 mg/dL    HDL Cholesterol 59 >39 mg/dL    VLDL Cholesterol Doug 20 5 - 40 mg/dL    LDL Chol Calc (NIH) 114 (H) 0 - 99 mg/dL    Chol/HDL Ratio 3.3 0.0 - 4.4 ratio   Vitamin B12    Collection Time: 01/24/25  8:05 AM    Specimen: Blood   Result Value Ref Range    Vitamin B-12 477 232 - 1,245 pg/mL   Vitamin D,25-Hydroxy    Collection Time: 01/24/25  8:05 AM    Specimen: Blood   Result Value Ref Range    25 Hydroxy, Vitamin D 25.2 (L) 30.0 - 100.0 ng/mL             Assessment & Plan  Other migraine without status migrainosus, not intractable  - continue current regimen         RLS (restless legs syndrome)  - continue current regimen       Moderate episode of recurrent major depressive disorder  - continue current regimen         VIDAL (generalized anxiety disorder)  - continue current regimen         Mixed obsessional thoughts and acts  - continue current regimen       Vitamin D deficiency  - QD supp = 5000IU       Iron deficiency anemia secondary to inadequate dietary iron intake  - nml labs for 3+ years  - stop iron supp             Medications, including side effects, were discussed with the patient. Patient verbalized understanding.  The plan of care was discussed. All questions were answered. Patient verbalized understanding.      Return in about 6 months (around 8/3/2025) for fasting labs one week prior to, Annual physical.

## 2025-02-05 ENCOUNTER — HOSPITAL ENCOUNTER (EMERGENCY)
Facility: HOSPITAL | Age: 30
Discharge: HOME | End: 2025-02-05
Attending: EMERGENCY MEDICINE
Payer: COMMERCIAL

## 2025-02-05 ENCOUNTER — TELEPHONE (OUTPATIENT)
Dept: INTERNAL MEDICINE | Facility: CLINIC | Age: 30
End: 2025-02-05
Payer: COMMERCIAL

## 2025-02-05 VITALS
HEART RATE: 89 BPM | WEIGHT: 152 LBS | HEIGHT: 57 IN | SYSTOLIC BLOOD PRESSURE: 121 MMHG | BODY MASS INDEX: 32.79 KG/M2 | TEMPERATURE: 97.6 F | OXYGEN SATURATION: 97 % | RESPIRATION RATE: 16 BRPM | DIASTOLIC BLOOD PRESSURE: 63 MMHG

## 2025-02-05 DIAGNOSIS — G43.809 OTHER MIGRAINE WITHOUT STATUS MIGRAINOSUS, NOT INTRACTABLE: ICD-10-CM

## 2025-02-05 DIAGNOSIS — G43.509 PERSISTENT MIGRAINE AURA WITHOUT CEREBRAL INFARCTION AND WITHOUT STATUS MIGRAINOSUS, NOT INTRACTABLE: Primary | ICD-10-CM

## 2025-02-05 PROCEDURE — 63710000001 PROMETHAZINE PER 25 MG: Performed by: EMERGENCY MEDICINE

## 2025-02-05 PROCEDURE — 25010000002 DEXAMETHASONE PER 1 MG: Performed by: EMERGENCY MEDICINE

## 2025-02-05 PROCEDURE — 63710000001 ONDANSETRON ODT 4 MG TABLET DISPERSIBLE: Performed by: EMERGENCY MEDICINE

## 2025-02-05 PROCEDURE — 99283 EMERGENCY DEPT VISIT LOW MDM: CPT

## 2025-02-05 PROCEDURE — 25010000002 KETOROLAC TROMETHAMINE PER 15 MG: Performed by: EMERGENCY MEDICINE

## 2025-02-05 PROCEDURE — 99283 EMERGENCY DEPT VISIT LOW MDM: CPT | Performed by: EMERGENCY MEDICINE

## 2025-02-05 PROCEDURE — 96372 THER/PROPH/DIAG INJ SC/IM: CPT

## 2025-02-05 RX ORDER — BUTALBITAL, ACETAMINOPHEN AND CAFFEINE 50; 325; 40 MG/1; MG/1; MG/1
TABLET ORAL
Qty: 12 TABLET | Refills: 0 | Status: SHIPPED | OUTPATIENT
Start: 2025-02-05

## 2025-02-05 RX ORDER — ONDANSETRON 4 MG/1
8 TABLET, ORALLY DISINTEGRATING ORAL ONCE
Status: COMPLETED | OUTPATIENT
Start: 2025-02-05 | End: 2025-02-05

## 2025-02-05 RX ORDER — HYDROCODONE BITARTRATE AND ACETAMINOPHEN 5; 325 MG/1; MG/1
1 TABLET ORAL ONCE AS NEEDED
Status: DISCONTINUED | OUTPATIENT
Start: 2025-02-05 | End: 2025-02-05 | Stop reason: HOSPADM

## 2025-02-05 RX ORDER — PROCHLORPERAZINE MALEATE 10 MG
10 TABLET ORAL EVERY 8 HOURS PRN
Qty: 20 TABLET | Refills: 0 | Status: SHIPPED | OUTPATIENT
Start: 2025-02-05 | End: 2025-02-10

## 2025-02-05 RX ORDER — KETOROLAC TROMETHAMINE 30 MG/ML
60 INJECTION, SOLUTION INTRAMUSCULAR; INTRAVENOUS ONCE
Status: COMPLETED | OUTPATIENT
Start: 2025-02-05 | End: 2025-02-05

## 2025-02-05 RX ORDER — PROMETHAZINE HYDROCHLORIDE 25 MG/1
25 TABLET ORAL ONCE
Status: COMPLETED | OUTPATIENT
Start: 2025-02-05 | End: 2025-02-05

## 2025-02-05 RX ADMIN — DEXAMETHASONE SODIUM PHOSPHATE 10 MG: 10 INJECTION, SOLUTION INTRAMUSCULAR; INTRAVENOUS at 01:53

## 2025-02-05 RX ADMIN — KETOROLAC TROMETHAMINE 60 MG: 30 INJECTION, SOLUTION INTRAMUSCULAR at 02:10

## 2025-02-05 RX ADMIN — ONDANSETRON 8 MG: 4 TABLET, ORALLY DISINTEGRATING ORAL at 01:52

## 2025-02-05 RX ADMIN — HYDROCODONE BITARTRATE AND ACETAMINOPHEN 1 TABLET: 5; 325 TABLET ORAL at 02:49

## 2025-02-05 RX ADMIN — PROMETHAZINE HYDROCHLORIDE 25 MG: 25 TABLET ORAL at 02:49

## 2025-02-05 NOTE — FSED PROVIDER NOTE
EMERGENCY DEPARTMENT ENCOUNTER    Room Number:    Date seen:  2025  Time seen: 01:45 EST  PCP: Maribel Estes APRN  Historian:     Discussed/obtained information from independent historians:     HPI:    Patient is a 29-year-old female.  She reports a history of migraine type headaches.  She presents with a headache since approximately noon today.  She reports that the pain is typical for her previous migraines but slightly worse.  It is in the occipital location with multiple episodes of nausea vomiting.  No fever no chills.  No focal motor or sensory deficits.  No neck pain or stiffness    External (non-ED) record review:        Chronic or social conditions impacting care:    ALLERGIES  Patient has no known allergies.    PAST MEDICAL HISTORY  Active Ambulatory Problems     Diagnosis Date Noted    Migraines 2022    Iron deficiency anemia 2023    RLS (restless legs syndrome) 2023    VIDAL (generalized anxiety disorder) 2024    Gastroesophageal reflux disease with esophagitis without hemorrhage 2024    Moderate episode of recurrent major depressive disorder 2024    Mixed obsessional thoughts and acts 2024    Vitamin D deficiency 2024    Esophageal dysphagia 2024    Eosinophilic esophagitis 2024     Resolved Ambulatory Problems     Diagnosis Date Noted    Anxiety 2023    Obesity (BMI 30.0-34.9) 2023    Lesion of skin of scalp 2023     Past Medical History:   Diagnosis Date    Anemia     Depression     GERD (gastroesophageal reflux disease)     Hidradenitis suppurativa 2023       PAST SURGICAL HISTORY  Past Surgical History:   Procedure Laterality Date     SECTION  2018    ENDOSCOPY N/A 10/30/2024    Procedure: ESOPHAGOGASTRODUODENOSCOPY WITH BIOPSY;  Surgeon: Rip Denny MD;  Location: [facility];  Service: Gastroenterology;  Laterality: N/A;  gastric bx; distal esophagus bx; proximal  esophagus bx; gastritis; abnormal mucosa of esophagus    EXCISION LESION N/A 08/01/2023    Procedure: EXCISION LESION from scalp;  Surgeon: Solis Paris MD;  Location: [facility];  Service: General;  Laterality: N/A;    TEETH EXTRACTION  2019    All top teeth removed- wears dentures    TUBAL ABDOMINAL LIGATION  2018       FAMILY HISTORY  Family History   Problem Relation Age of Onset    Anxiety disorder Mother     Heart disease Father     Hypertension Father     Diverticulitis Father     Autism Sister     No Known Problems Daughter     No Known Problems Son     Diabetes Paternal Uncle     Asthma Paternal Uncle     Learning disabilities Sister     Cancer Paternal Grandmother         Leukemia       SOCIAL HISTORY  Social History     Socioeconomic History    Marital status:    Tobacco Use    Smoking status: Former     Current packs/day: 0.00     Average packs/day: 0.2 packs/day for 1 year (0.3 ttl pk-yrs)     Types: Cigarettes     Start date: 1/1/2014     Quit date: 12/1/2014     Years since quitting: 10.1     Passive exposure: Past    Smokeless tobacco: Never   Vaping Use    Vaping status: Never Used   Substance and Sexual Activity    Alcohol use: Not Currently    Drug use: Never    Sexual activity: Yes     Partners: Male     Birth control/protection: Tubal ligation       REVIEW OF SYSTEMS  Review of Systems    All systems reviewed and negative except for those discussed in HPI.       PHYSICAL EXAM    I have reviewed the triage vital signs and nursing notes.  Vitals:    02/05/25 0145   BP: 121/63   Pulse: 89   Resp: 16   Temp: 97.6 °F (36.4 °C)   SpO2: 97%     Physical Exam  Vital signs: Reviewed in nurses notes    General: Awake alert.  She appears to be slightly uncomfortable but certainly is nontoxic    HEENT: Pupils equal round responsive to light.  Extra-ocular movements are intact.  No scleral icterus.  Nasopharynx is clear.  Oropharynx is clear with moist mucous membranes.  No masses  noted    Neck:   Supple, no meningismus    Respiratory:   Nonlabored respirations.  Clear to auscultation bilaterally.  Equal breath sounds bilaterally.  No wheezes or stridor noted.    Cardiovascular: Regular rate and rhythm.  No murmur.  Equal pulses in bilateral lower extremities without edema.    Abdomen: Nondistended    Skin:   Warm and dry.  No rashes noted    Neurological examination: Patient is awake alert oriented x4.  Speech is normal.  No facial palsy.  No focal motor or sensory deficits.    LAB RESULTS  No results found for this or any previous visit (from the past 24 hours).    Ordered the above labs and independently interpreted results.  My findings will be discussed in the ED course or medical decision making section below      PROCEDURES:  Procedures      RADIOLOGY RESULTS  No Radiology Exams Resulted Within Past 24 Hours     Ordered the above noted radiological studies.  Independently interpreted by me.  My findings will be discussed in the medical decision section below.     PROGRESS, DATA ANALYSIS, CONSULTS AND MEDICAL DECISION MAKING    Please note that this section constitutes my independent interpretation of clinical data including lab results, radiology, EKG's.  This constitutes my independent professional opinion regarding differential diagnosis and management of this patient.  It may include any factors such as history from outside sources, review of external records, social determinants of health, management of medications, response to those treatments, and discussions with other providers.       Orders placed during this visit:  No orders of the defined types were placed in this encounter.      Medications   HYDROcodone-acetaminophen (NORCO) 5-325 MG per tablet 1 tablet (has no administration in time range)   promethazine (PHENERGAN) tablet 25 mg (has no administration in time range)   ketorolac (TORADOL) injection 60 mg (60 mg Intramuscular Given 2/5/25 0210)   ondansetron ODT  (ZOFRAN-ODT) disintegrating tablet 8 mg (8 mg Oral Given 2/5/25 0152)   dexAMETHasone (DECADRON) 10 MG/ML oral solution 10 mg (10 mg Oral Given 2/5/25 0153)            Medical Decision Making  Problems Addressed:  Other migraine without status migrainosus, not intractable: complicated acute illness or injury  Persistent migraine aura without cerebral infarction and without status migrainosus, not intractable: complicated acute illness or injury    Risk  Prescription drug management.            DIAGNOSIS  Final diagnoses:   Persistent migraine aura without cerebral infarction and without status migrainosus, not intractable   Other migraine without status migrainosus, not intractable          Medication List        New Prescriptions      butalbital-acetaminophen-caffeine -40 MG per tablet  Commonly known as: FIORICET, ESGIC  1 tabs po q6h prn severe headache     prochlorperazine 10 MG tablet  Commonly known as: COMPAZINE  Take 1 tablet by mouth Every 8 (Eight) Hours As Needed for Nausea or Vomiting (with headache).               Where to Get Your Medications        These medications were sent to [id number] - [place], [place] - [address] - [phone]  - [phone] FX  [address] [facility] [place] [address]      Phone: [phone]   butalbital-acetaminophen-caffeine -40 MG per tablet  prochlorperazine 10 MG tablet         FOLLOW-UP  Maribel Estes, APRN  [address]     [place] [id number] [phone]    In 1 week          Latest Documented Vital Signs:  As of 02:27 EST  BP- 121/63 HR- 89 Temp- 97.6 °F (36.4 °C) (Oral) O2 sat- 97%    Appropriate PPE utilized throughout this patient encounter to include mask, if indicated, per current protocol. Hand hygiene was performed before donning PPE and after removal when leaving the room.    Please note that portions of this were completed with a voice recognition program.     Note Disclaimer: At [facility], we believe  that sharing information builds trust and better relationships. You are receiving this note because you are receiving care at [facility] or recently visited. It is possible you will see health information before a provider has talked with you about it. This kind of information can be easy to misunderstand. To help you fully understand what it means for your health, we urge you to discuss this note with your provider.

## 2025-02-05 NOTE — TELEPHONE ENCOUNTER
Caller: Eugenia Le    Relationship to patient: Self      Best call back number: [phone]    Provider: ANDIE BOWENS     Medication PA needed:     butalbital-acetaminophen-caffeine (FIORICET, ESGIC) -40 MG per tablet     CHECKING STATUS OF PRIOR AUTHORIZATION   PLEASE ADVISE

## 2025-02-05 NOTE — DISCHARGE INSTRUCTIONS
I did send in 2 medications to your pharmacy to pick this morning for persistent headache.  Take as directed    Please read all of the instructions in this handout.  If you receive prescriptions please fill them and take them as directed.  Please call your primary care physician for follow-up appointment in the next 5 to 7 days.  If you do not have a physician you may call the [facility] referral line at [phone].    You may return to the emergency department at any time for any concerns such as worsening symptoms.  If you received a work or school note it will be printed at the back of this packet.

## 2025-02-05 NOTE — TELEPHONE ENCOUNTER
Patient was seen in the ED for a migraine. ED prescribed this medication and patient stated that this worked better and faster than the sumatriptan and she would like to know if you are okay with prescribing this for her migraines instead of the sumatriptan? If so, this will need a PA.

## 2025-02-10 ENCOUNTER — OFFICE VISIT (OUTPATIENT)
Dept: GASTROENTEROLOGY | Facility: CLINIC | Age: 30
End: 2025-02-10
Payer: COMMERCIAL

## 2025-02-10 VITALS
WEIGHT: 148.6 LBS | HEIGHT: 57 IN | DIASTOLIC BLOOD PRESSURE: 84 MMHG | BODY MASS INDEX: 32.06 KG/M2 | SYSTOLIC BLOOD PRESSURE: 112 MMHG

## 2025-02-10 DIAGNOSIS — K21.00 GASTROESOPHAGEAL REFLUX DISEASE WITH ESOPHAGITIS WITHOUT HEMORRHAGE: Primary | ICD-10-CM

## 2025-02-10 DIAGNOSIS — K20.0 EOSINOPHILIC ESOPHAGITIS: ICD-10-CM

## 2025-02-10 DIAGNOSIS — R13.19 ESOPHAGEAL DYSPHAGIA: ICD-10-CM

## 2025-02-10 DIAGNOSIS — K58.1 IRRITABLE BOWEL SYNDROME WITH CONSTIPATION: ICD-10-CM

## 2025-02-10 PROCEDURE — 99214 OFFICE O/P EST MOD 30 MIN: CPT

## 2025-02-10 PROCEDURE — 1159F MED LIST DOCD IN RCRD: CPT

## 2025-02-10 PROCEDURE — 1160F RVW MEDS BY RX/DR IN RCRD: CPT

## 2025-02-10 RX ORDER — DUPILUMAB 300 MG/2ML
300 INJECTION, SOLUTION SUBCUTANEOUS WEEKLY
Qty: 4 ML | Refills: 11 | Status: SHIPPED | OUTPATIENT
Start: 2025-02-10 | End: 2025-02-13 | Stop reason: SDUPTHER

## 2025-02-10 RX ORDER — VONOPRAZAN FUMARATE 13.36 MG/1
10 TABLET ORAL DAILY
Qty: 90 TABLET | Refills: 1 | Status: SHIPPED | OUTPATIENT
Start: 2025-02-10

## 2025-02-10 NOTE — PROGRESS NOTES
Chief Complaint   Patient presents with    Eosinophilic esophagitis    Heartburn    Irritable Bowel Syndrome    Constipation         Patient is a 29 y.o. who presents to the office for follow-up and management of EOE after initiation of Dupixent.  Last in office visit completed on 2024.  Patient has a significant past medical history of EOE initially diagnosed during EGD evaluation with Dr. Barnett 2023, constipation, anemia, anxiety, hydradenitis suppurativa, and migraines.      10/30/2024 EGD:  Mucosal changes within the esophagus characterized by longitudinal furrows involving proximal, mid, and distal esophagus suspicious for EOE  Distal esophageal sampling: Increased intraepithelial eosinophils measuring up to 4 per high-powered field  Proximal sampling: Increased eosinophils measuring up to 25 per hpf  Gastritis involving gastric body and antrum characterized by moderate inflammation with erosions and linear erosions  Endoscopically normal-appearing duodenum  LA grade B esophagitis    Past Surgical History:  2018    2018 tubal ligation     Social History:   Former tobacco user    Denies e-cigarrette,  alcohol or drug use     Family history:  Denies known family history of colon cancer, colon polyps, or IBD    Previous/current treatment of IBS-C:  Linzess 290 mcg- not efficacious   Trulance 3 mg   Initiated 2024; patient discontinued after bowel habits improved in frequency without noticeable change upon stopping Trulance regimen.    Previous/current tx GERD:  Current:  Voquezna 20 mg, then transisitioned to 10 mg     History of Present Illness    She adheres to a weekly regimen of Dupilumab (Dupixent) with no reported injection site reactions.  She continues Voquenza 20 mg once daily and is scheduled to reduce to 10 mg dosing.  Since starting this treatment plan she reports heartburn, nausea, vomiting, and dysphagia has significantly improved without recurrence and dysphagia symptoms.      Her bowel movements occur once daily, with no hematochezia. She previously discontinued Plecanatide after achieving regular bowel movements.  Denies melena or hematochezia.  Denies abdominal pain.  She seeks a refill of Dupilumab, now processed through [facility]'s specialty mail service.    Overall patient describes GI concerns as well-controlled on current treatment plan.      Result Review :    Office Visit with Jessy Gr APRN (09/24/2024)   Office Visit with Maribel Estes APRN (09/12/2024)    Liver (05/07/2024 14:35) - for elevated LFTs  Cholelithiasis without evidence of acute cholecystitis  Upper GI Endoscopy (10/30/2024 14:26)   Common labs          5/21/2024    15:46 7/11/2024    13:10 1/24/2025    08:05   Common Labs   Glucose   74    BUN   8    Creatinine   0.88    Sodium   138    Potassium   4.1    Chloride   102    Calcium   10.0    Total Protein   7.7    Albumin   4.7    Total Bilirubin   0.4    Alkaline Phosphatase   90    AST (SGOT)   54    ALT (SGPT)   70    WBC 8.49      7.0    Hemoglobin 14.0      13.7    Hematocrit 43.2      41.9    Platelets 231      278    Total Cholesterol  191  193    Triglycerides  77  111    HDL Cholesterol  48  59    LDL Cholesterol   129  114       Details          This result is from an external source.             reviewed and updated with patient  Outpatient Medications Marked as Taking for the 2/10/25 encounter (Office Visit) with Jessy Gr APRN   Medication Sig Dispense Refill    busPIRone (BUSPAR) 5 MG tablet Take 1 tablet by mouth Every Night. May also take 1 tablet 2 (Two) Times a Day As Needed (anxiety). 90 tablet 1    butalbital-acetaminophen-caffeine (FIORICET, ESGIC) -40 MG per tablet 1 tabs po q6h prn severe headache 12 tablet 0    Dupilumab (Dupixent) 300 MG/2ML solution prefilled syringe Inject 2 mL under the skin into the appropriate area as directed 1 (One) Time Per Week. 4 mL 11    ferrous gluconate 324 (37.5 Fe) MG tablet  "tablet Take 1 tablet by mouth Daily With Breakfast. 90 tablet 1    ketorolac (TORADOL) 10 MG tablet Take 1 tablet by mouth Every 6 (Six) Hours As Needed (Migraine headache). 15 tablet 5    mupirocin (BACTROBAN) 2 % ointment Apply 1 Application topically to the appropriate area as directed 3 (Three) Times a Day.      ondansetron ODT (ZOFRAN-ODT) 4 MG disintegrating tablet Place 1 tablet on the tongue Every 6 (Six) Hours As Needed for Nausea or Vomiting. 20 tablet 5    rOPINIRole (REQUIP) 0.5 MG tablet TAKE 1 TABLET BY MOUTH IN THE EVENING ONE HOUR BEFORE BEDTIME 30 tablet 0    sertraline (ZOLOFT) 100 MG tablet Take 1 tablet by mouth Daily. 90 tablet 1    SUMAtriptan (Imitrex) 100 MG tablet Take one tablet at onset of headache. May repeat dose one time in 2 hours if headache not relieved. 10 tablet 5    topiramate (TOPAMAX) 50 MG tablet Take 1 tablet by mouth 2 (Two) Times a Day. 180 tablet 1    VITAMIN D PO Take  by mouth. Buys OTC      [DISCONTINUED] Dupilumab (Dupixent) 300 MG/2ML solution prefilled syringe Inject 2 mL under the skin into the appropriate area as directed 1 (One) Time Per Week. 4 mL 11    [DISCONTINUED] famotidine (PEPCID) 20 MG tablet Take 1 tablet by mouth.      [DISCONTINUED] ibuprofen (ADVIL,MOTRIN) 600 MG tablet Take 1 tablet by mouth.      [DISCONTINUED] prochlorperazine (COMPAZINE) 10 MG tablet Take 1 tablet by mouth Every 8 (Eight) Hours As Needed for Nausea or Vomiting (with headache). 20 tablet 0    [DISCONTINUED] promethazine (PHENERGAN) 25 MG tablet Take one tablet by mouth every 6 hours as needed for nausea 20 tablet 5    [DISCONTINUED] Vonoprazan Fumarate (Voquezna) 20 MG tablet Take 1 tablet by mouth Daily. 90 tablet 1     Vital Signs:   /84 (BP Location: Left arm, Patient Position: Sitting, Cuff Size: Adult)   Ht 144.8 cm (57\")   Wt 67.4 kg (148 lb 9.6 oz)   BMI 32.16 kg/m²     Body mass index is 32.16 kg/m².  Physical Exam  Constitutional:       General: She is not in acute " distress.     Appearance: Normal appearance. She is not ill-appearing.   HENT:      Head: Normocephalic.   Eyes:      General: No scleral icterus.  Pulmonary:      Effort: No respiratory distress.   Abdominal:      General: Abdomen is flat. Bowel sounds are normal. There is no distension.      Palpations: Abdomen is soft. There is no mass.      Tenderness: There is no abdominal tenderness. There is no guarding or rebound.      Hernia: No hernia is present.   Skin:     General: Skin is warm and dry.   Neurological:      Mental Status: She is alert.      Gait: Gait normal.   Psychiatric:         Mood and Affect: Mood normal.       Assessment and Plan    Diagnoses and all orders for this visit:    1. Gastroesophageal reflux disease with esophagitis without hemorrhage (Primary)  -     Vonoprazan Fumarate (Voquezna) 10 MG tablet; Take 1 tablet by mouth Daily.  Dispense: 90 tablet; Refill: 1    2. Eosinophilic esophagitis  -     Vonoprazan Fumarate (Voquezna) 10 MG tablet; Take 1 tablet by mouth Daily.  Dispense: 90 tablet; Refill: 1  -     Dupilumab (Dupixent) 300 MG/2ML solution prefilled syringe; Inject 2 mL under the skin into the appropriate area as directed 1 (One) Time Per Week.  Dispense: 4 mL; Refill: 11    3. Esophageal dysphagia    4. Irritable bowel syndrome with constipation       Assessment & Plan  Constipation:  - Transitioned from Linzess to Trulance 3 mg, discontinued after achieving regular bowel movements  -Patient encouraged to contact her office in the future if constipation recurs for further recommendations.    EOE with dysphagia:  -Overall doing well with Dupixent and denies concerns with weekly injections or evidence of injection site reactions.  - Reports improvement with no recent food impaction  - Continue current treatment plan    Nausea:  - Significantly improved  - Continue current treatment plan  -Denies use of antiemetics for symptomatic relief.  Will notify our office if symptom recurs  for further recommendations.    Medication management:  - Refill for Dupixent sent to [facility] specialty pharmacy  -Agreeable for transitioning of requesting a from 20 mg to 10 mg per treatment guidelines  - Continue Dupixent and inform provider of any difficulties obtaining medication    Follow-up in 6 months    Patient is agreeable to the outlined above treatment plan.  Verbalizes understanding and will contact office for any new or worsening concerns.  All questions answered and support provided.  Patient Instructions   Ways to help reduce heartburn symptoms:    Begin taking Voquezna 10 mg daily once prescription is received   Avoid eating late night snacks within 3 hours of going to bed  If you have increased abdominal body weight, lifestyle changes to improve weight can help with heartburn symtoms  If you use tobacco products, we recommend that you stop smoking including e-cigarettes  Research has proven that people with heartburn who use tobacco can reduce heartburn symptoms by 44% after they stop smoking.   Sleep on your left side  Sleeping with your head/upper body elevated with a wedge pillow or with the head of the bed inclined   Avoid foods that can trigger symptoms which may include:  citrus fruits  spicy foods,  Tomatoes and Red sauces   Chocolate  coffee/tea  caffeinated or carbonated beverages  Alcohol  High fat foods  peppermint      Continue the dupixent       EMR Dragon/Transcription Disclaimer:  This document has been Dictated utilizing Dragon dictation.   Patient or patient representative verbalized consent for the use of Ambient Listening during the visit with  SARITA Soliz for chart documentation. 2/10/2025  12:43 EST

## 2025-02-10 NOTE — PATIENT INSTRUCTIONS
Ways to help reduce heartburn symptoms:    Begin taking Voquezna 10 mg daily once prescription is received   Avoid eating late night snacks within 3 hours of going to bed  If you have increased abdominal body weight, lifestyle changes to improve weight can help with heartburn symtoms  If you use tobacco products, we recommend that you stop smoking including e-cigarettes  Research has proven that people with heartburn who use tobacco can reduce heartburn symptoms by 44% after they stop smoking.   Sleep on your left side  Sleeping with your head/upper body elevated with a wedge pillow or with the head of the bed inclined   Avoid foods that can trigger symptoms which may include:  citrus fruits  spicy foods,  Tomatoes and Red sauces   Chocolate  coffee/tea  caffeinated or carbonated beverages  Alcohol  High fat foods  peppermint      Continue the dupixent

## 2025-02-13 ENCOUNTER — TELEPHONE (OUTPATIENT)
Dept: GASTROENTEROLOGY | Facility: CLINIC | Age: 30
End: 2025-02-13

## 2025-02-13 DIAGNOSIS — K20.0 EOSINOPHILIC ESOPHAGITIS: ICD-10-CM

## 2025-02-13 RX ORDER — DUPILUMAB 300 MG/2ML
300 INJECTION, SOLUTION SUBCUTANEOUS WEEKLY
Qty: 8 ML | Refills: 11 | Status: SHIPPED | OUTPATIENT
Start: 2025-02-13 | End: 2025-03-03 | Stop reason: SDUPTHER

## 2025-02-13 NOTE — TELEPHONE ENCOUNTER
Caller: Eugenia Le    Relationship to patient: Self      Best call back number: [phone]    Provider: GEORGIE CORBETT    Medication PA needed: Dupilumab (Dupixent) 300 MG/2ML solution prefilled syringe [[id number]] (Order [id number])     Reason for call/Prior Auth: THE MEDICATION AMOUNT PRESCRIBED IS DIFFERENT THAN WHAT INSURANCE IS FILLING FOR.

## 2025-02-13 NOTE — TELEPHONE ENCOUNTER
Sent pa through [facility] for dupixent quantity 4 pens in 28 days;      Member has an active PA on file which is expiring on 11/17/2025 and has 993 no. of fills remaining.

## 2025-02-21 DIAGNOSIS — F41.9 ANXIETY: ICD-10-CM

## 2025-02-24 RX ORDER — BUSPIRONE HYDROCHLORIDE 5 MG/1
TABLET ORAL
Qty: 90 TABLET | Refills: 1 | Status: SHIPPED | OUTPATIENT
Start: 2025-02-24

## 2025-02-24 NOTE — TELEPHONE ENCOUNTER
Rx Refill Note  Requested Prescriptions     Pending Prescriptions Disp Refills    busPIRone (BUSPAR) 5 MG tablet 90 tablet 1     Sig: Take 1 tablet by mouth Every Night. May also take 1 tablet 2 (Two) Times a Day As Needed (anxiety).      Last office visit with prescribing clinician: 6/2/2023   Last telemedicine visit with prescribing clinician: Visit date not found   Next office visit with prescribing clinician: Visit date not found                         Would you like a call back once the refill request has been completed: [] Yes [] No    If the office needs to give you a call back, can they leave a voicemail: [] Yes [] No    Zita Ramos MA  02/24/25, 08:27 EST

## 2025-03-03 DIAGNOSIS — G43.109 MIGRAINE WITH AURA AND WITHOUT STATUS MIGRAINOSUS, NOT INTRACTABLE: ICD-10-CM

## 2025-03-03 DIAGNOSIS — K20.0 EOSINOPHILIC ESOPHAGITIS: ICD-10-CM

## 2025-03-03 DIAGNOSIS — G43.809 OTHER MIGRAINE WITHOUT STATUS MIGRAINOSUS, NOT INTRACTABLE: ICD-10-CM

## 2025-03-03 DIAGNOSIS — G43.111 INTRACTABLE MIGRAINE WITH AURA WITH STATUS MIGRAINOSUS: ICD-10-CM

## 2025-03-03 RX ORDER — DUPILUMAB 300 MG/2ML
300 INJECTION, SOLUTION SUBCUTANEOUS WEEKLY
Qty: 8 ML | Refills: 11 | Status: SHIPPED | OUTPATIENT
Start: 2025-03-03

## 2025-03-03 RX ORDER — ONDANSETRON 4 MG/1
4 TABLET, ORALLY DISINTEGRATING ORAL EVERY 6 HOURS PRN
Qty: 20 TABLET | Refills: 5 | Status: SHIPPED | OUTPATIENT
Start: 2025-03-03

## 2025-03-03 RX ORDER — SUMATRIPTAN SUCCINATE 100 MG/1
TABLET ORAL
Qty: 10 TABLET | Refills: 5 | Status: SHIPPED | OUTPATIENT
Start: 2025-03-03

## 2025-03-04 RX ORDER — KETOROLAC TROMETHAMINE 10 MG/1
10 TABLET, FILM COATED ORAL EVERY 6 HOURS PRN
Qty: 15 TABLET | Refills: 5 | Status: SHIPPED | OUTPATIENT
Start: 2025-03-04

## 2025-03-04 NOTE — TELEPHONE ENCOUNTER
Rx Refill Note  Requested Prescriptions     Pending Prescriptions Disp Refills    ketorolac (TORADOL) 10 MG tablet 15 tablet 5     Sig: Take 1 tablet by mouth Every 6 (Six) Hours As Needed (Migraine headache).      Last office visit with prescribing clinician: Visit date not found   Last telemedicine visit with prescribing clinician: Visit date not found   Next office visit with prescribing clinician: Visit date not found                         Would you like a call back once the refill request has been completed: [] Yes [] No    If the office needs to give you a call back, can they leave a voicemail: [] Yes [] No    Augustus Garcia MA  03/04/25, 11:44 EST

## 2025-03-06 RX ORDER — SERTRALINE HYDROCHLORIDE 100 MG/1
100 TABLET, FILM COATED ORAL DAILY
Qty: 90 TABLET | Refills: 0 | Status: SHIPPED | OUTPATIENT
Start: 2025-03-06

## 2025-03-25 ENCOUNTER — TELEPHONE (OUTPATIENT)
Dept: INTERNAL MEDICINE | Facility: CLINIC | Age: 30
End: 2025-03-25
Payer: COMMERCIAL

## 2025-03-25 NOTE — TELEPHONE ENCOUNTER
Caller: Eugenia Le    Relationship: Self    Best call back number:     What is the best time to reach you:     Who are you requesting to speak with (clinical staff, provider,  specific staff member):     Do you know the name of the person who called:     What was the call regarding: PATIENT IS CALLING IN TO ASK FOR A CALL BACK FROM DR BOWENS OR STAFF AS SHE NEEDS TO DISCUSS HER OMEPRAZOLE  0.5MG MEDICATION(THIS IS NOT LISTED ON HER MEDICATION LIST)

## 2025-03-26 NOTE — TELEPHONE ENCOUNTER
Called and spoke with patient. She said the issue has been resolved and she is picking up this medication on Friday.

## 2025-04-07 DIAGNOSIS — K20.0 EOSINOPHILIC ESOPHAGITIS: ICD-10-CM

## 2025-04-08 RX ORDER — DUPILUMAB 300 MG/2ML
300 INJECTION, SOLUTION SUBCUTANEOUS WEEKLY
Qty: 8 ML | Refills: 11 | Status: SHIPPED | OUTPATIENT
Start: 2025-04-08

## 2025-04-10 DIAGNOSIS — E55.9 VITAMIN D DEFICIENCY: ICD-10-CM

## 2025-04-10 RX ORDER — ERGOCALCIFEROL 1.25 MG/1
50000 CAPSULE, LIQUID FILLED ORAL 2 TIMES WEEKLY
Qty: 24 CAPSULE | Refills: 0 | OUTPATIENT
Start: 2025-04-10

## 2025-04-13 DIAGNOSIS — E55.9 VITAMIN D DEFICIENCY: ICD-10-CM

## 2025-04-14 RX ORDER — SERTRALINE HYDROCHLORIDE 100 MG/1
100 TABLET, FILM COATED ORAL DAILY
Qty: 90 TABLET | Refills: 0 | OUTPATIENT
Start: 2025-04-14

## 2025-04-14 RX ORDER — ERGOCALCIFEROL 1.25 MG/1
50000 CAPSULE, LIQUID FILLED ORAL 2 TIMES WEEKLY
Qty: 24 CAPSULE | Refills: 0 | OUTPATIENT
Start: 2025-04-14

## 2025-04-22 DIAGNOSIS — K20.0 EOSINOPHILIC ESOPHAGITIS: ICD-10-CM

## 2025-04-22 RX ORDER — DUPILUMAB 300 MG/2ML
300 INJECTION, SOLUTION SUBCUTANEOUS WEEKLY
Qty: 8 ML | Refills: 11 | Status: SHIPPED | OUTPATIENT
Start: 2025-04-22

## 2025-05-01 ENCOUNTER — TELEPHONE (OUTPATIENT)
Dept: GASTROENTEROLOGY | Facility: CLINIC | Age: 30
End: 2025-05-01

## 2025-05-01 ENCOUNTER — OFFICE VISIT (OUTPATIENT)
Dept: GASTROENTEROLOGY | Facility: CLINIC | Age: 30
End: 2025-05-01
Payer: COMMERCIAL

## 2025-05-01 ENCOUNTER — LAB (OUTPATIENT)
Dept: LAB | Facility: HOSPITAL | Age: 30
End: 2025-05-01
Payer: COMMERCIAL

## 2025-05-01 VITALS
HEIGHT: 57 IN | DIASTOLIC BLOOD PRESSURE: 72 MMHG | BODY MASS INDEX: 28.87 KG/M2 | SYSTOLIC BLOOD PRESSURE: 162 MMHG | WEIGHT: 133.8 LBS

## 2025-05-01 DIAGNOSIS — R14.2 ERUCTATION: ICD-10-CM

## 2025-05-01 DIAGNOSIS — R79.89 ELEVATED LFTS: Primary | ICD-10-CM

## 2025-05-01 DIAGNOSIS — K20.0 EOSINOPHILIC ESOPHAGITIS: ICD-10-CM

## 2025-05-01 LAB
ALBUMIN SERPL-MCNC: 4.7 G/DL (ref 3.5–5.2)
ALBUMIN/GLOB SERPL: 1.5 G/DL
ALP SERPL-CCNC: 94 U/L (ref 39–117)
ALPHA1 GLOB MFR UR ELPH: 159 MG/DL (ref 90–200)
ALT SERPL W P-5'-P-CCNC: 14 U/L (ref 1–33)
ANION GAP SERPL CALCULATED.3IONS-SCNC: 11.5 MMOL/L (ref 5–15)
AST SERPL-CCNC: 23 U/L (ref 1–32)
BASOPHILS # BLD AUTO: 0.04 10*3/MM3 (ref 0–0.2)
BASOPHILS NFR BLD AUTO: 0.5 % (ref 0–1.5)
BILIRUB SERPL-MCNC: 0.2 MG/DL (ref 0–1.2)
BUN SERPL-MCNC: 9 MG/DL (ref 6–20)
BUN/CREAT SERPL: 8.5 (ref 7–25)
CALCIUM SPEC-SCNC: 10 MG/DL (ref 8.6–10.5)
CERULOPLASMIN SERPL-MCNC: 24 MG/DL (ref 19–39)
CHLORIDE SERPL-SCNC: 107 MMOL/L (ref 98–107)
CO2 SERPL-SCNC: 18.5 MMOL/L (ref 22–29)
CREAT SERPL-MCNC: 1.06 MG/DL (ref 0.57–1)
DEPRECATED RDW RBC AUTO: 41.7 FL (ref 37–54)
EGFRCR SERPLBLD CKD-EPI 2021: 73.1 ML/MIN/1.73
EOSINOPHIL # BLD AUTO: 0.1 10*3/MM3 (ref 0–0.4)
EOSINOPHIL NFR BLD AUTO: 1.2 % (ref 0.3–6.2)
ERYTHROCYTE [DISTWIDTH] IN BLOOD BY AUTOMATED COUNT: 12.4 % (ref 12.3–15.4)
FERRITIN SERPL-MCNC: 42.1 NG/ML (ref 13–150)
GGT SERPL-CCNC: 15 U/L (ref 5–36)
GLOBULIN UR ELPH-MCNC: 3.2 GM/DL
GLUCOSE SERPL-MCNC: 89 MG/DL (ref 65–99)
HAV IGM SERPL QL IA: NORMAL
HBV CORE IGM SERPL QL IA: NORMAL
HBV SURFACE AG SERPL QL IA: NORMAL
HCT VFR BLD AUTO: 45.6 % (ref 34–46.6)
HCV AB SER QL: NORMAL
HGB BLD-MCNC: 14.6 G/DL (ref 12–15.9)
IGA1 MFR SER: 162 MG/DL (ref 70–400)
IGG1 SER-MCNC: 1411 MG/DL (ref 700–1600)
IGM SERPL-MCNC: 84 MG/DL (ref 40–230)
IMM GRANULOCYTES # BLD AUTO: 0.02 10*3/MM3 (ref 0–0.05)
IMM GRANULOCYTES NFR BLD AUTO: 0.2 % (ref 0–0.5)
IRON 24H UR-MRATE: 51 MCG/DL (ref 37–145)
IRON SATN MFR SERPL: 14 % (ref 20–50)
LYMPHOCYTES # BLD AUTO: 2.26 10*3/MM3 (ref 0.7–3.1)
LYMPHOCYTES NFR BLD AUTO: 26.2 % (ref 19.6–45.3)
MCH RBC QN AUTO: 29.4 PG (ref 26.6–33)
MCHC RBC AUTO-ENTMCNC: 32 G/DL (ref 31.5–35.7)
MCV RBC AUTO: 91.9 FL (ref 79–97)
MONOCYTES # BLD AUTO: 0.6 10*3/MM3 (ref 0.1–0.9)
MONOCYTES NFR BLD AUTO: 7 % (ref 5–12)
NEUTROPHILS NFR BLD AUTO: 5.59 10*3/MM3 (ref 1.7–7)
NEUTROPHILS NFR BLD AUTO: 64.9 % (ref 42.7–76)
NRBC BLD AUTO-RTO: 0 /100 WBC (ref 0–0.2)
PLATELET # BLD AUTO: 283 10*3/MM3 (ref 140–450)
PMV BLD AUTO: 10.9 FL (ref 6–12)
POTASSIUM SERPL-SCNC: 4.1 MMOL/L (ref 3.5–5.2)
PROT SERPL-MCNC: 7.9 G/DL (ref 6–8.5)
RBC # BLD AUTO: 4.96 10*6/MM3 (ref 3.77–5.28)
SODIUM SERPL-SCNC: 137 MMOL/L (ref 136–145)
TIBC SERPL-MCNC: 375 MCG/DL (ref 298–536)
TRANSFERRIN SERPL-MCNC: 252 MG/DL (ref 200–360)
WBC NRBC COR # BLD AUTO: 8.61 10*3/MM3 (ref 3.4–10.8)

## 2025-05-01 PROCEDURE — 86038 ANTINUCLEAR ANTIBODIES: CPT

## 2025-05-01 PROCEDURE — 86231 EMA EACH IG CLASS: CPT

## 2025-05-01 PROCEDURE — 86364 TISS TRNSGLTMNASE EA IG CLAS: CPT

## 2025-05-01 PROCEDURE — 82103 ALPHA-1-ANTITRYPSIN TOTAL: CPT

## 2025-05-01 PROCEDURE — 82977 ASSAY OF GGT: CPT

## 2025-05-01 PROCEDURE — 83540 ASSAY OF IRON: CPT

## 2025-05-01 PROCEDURE — 80053 COMPREHEN METABOLIC PANEL: CPT

## 2025-05-01 PROCEDURE — 82390 ASSAY OF CERULOPLASMIN: CPT

## 2025-05-01 PROCEDURE — 82728 ASSAY OF FERRITIN: CPT

## 2025-05-01 PROCEDURE — 86381 MITOCHONDRIAL ANTIBODY EACH: CPT

## 2025-05-01 PROCEDURE — 86376 MICROSOMAL ANTIBODY EACH: CPT

## 2025-05-01 PROCEDURE — 84466 ASSAY OF TRANSFERRIN: CPT

## 2025-05-01 PROCEDURE — 86015 ACTIN ANTIBODY EACH: CPT

## 2025-05-01 PROCEDURE — 82784 ASSAY IGA/IGD/IGG/IGM EACH: CPT

## 2025-05-01 PROCEDURE — 36415 COLL VENOUS BLD VENIPUNCTURE: CPT

## 2025-05-01 PROCEDURE — 80074 ACUTE HEPATITIS PANEL: CPT

## 2025-05-01 PROCEDURE — 85025 COMPLETE CBC W/AUTO DIFF WBC: CPT

## 2025-05-01 RX ORDER — AMOXICILLIN 500 MG/1
500 CAPSULE ORAL 3 TIMES DAILY
COMMUNITY
Start: 2025-04-23

## 2025-05-01 RX ORDER — OMEPRAZOLE 40 MG/1
1 CAPSULE, DELAYED RELEASE ORAL EVERY 12 HOURS SCHEDULED
COMMUNITY
Start: 2025-03-28 | End: 2025-05-01

## 2025-05-01 RX ORDER — FLUTICASONE PROPIONATE 50 MCG
2 SPRAY, SUSPENSION (ML) NASAL NIGHTLY
Qty: 11.1 G | Refills: 1 | Status: SHIPPED | OUTPATIENT
Start: 2025-05-01

## 2025-05-01 NOTE — PATIENT INSTRUCTIONS
Blood work today     Once we receive these results, our office will contact you to discuss updating your treatment plan as indicated      Scheduling of your Ordered Diagnostic Tests :    A team member from [facility] scheduling department will contact you to schedule your tests.    You can also contact them directly at [phone]       Stop 10 mg dosing of Voquenza and start 20 mg dosing x 15 days-samples provided at time of today's visit    Stop taking omeprazole    Start inserting 2 sprays of Flonase in each nare as well as inserting saline nasal flush twice daily to see if postnasal drainage is contributing to current symptoms.    Continue with Dupixent as scheduled

## 2025-05-01 NOTE — TELEPHONE ENCOUNTER
Member has an active PA on file which is expiring on 11/17/2025 and has 988 no. of fills remaining.    Active PA on file for 4 pens in 28 days

## 2025-05-01 NOTE — TELEPHONE ENCOUNTER
We will need to file appeal as this is not therapeutic dosing for EOE.      Thank you,    SARITA Soliz

## 2025-05-01 NOTE — PROGRESS NOTES
Patient or patient representative verbalized consent for the use of Ambient Listening during the visit with  SARITA Soliz for chart documentation. 2025  11:27 EDT    Chief Complaint   Patient presents with    Eosinophilic esophagitis         Patient is a 29 y.o. who presents to the office for follow-up and further evaluation of increased eructation.  Last in office visit completed on 2/10/2025.  Patient has a significant past medical history of EOE initially diagnosed during EGD evaluation with Dr. Barnett 2023, constipation, anemia, anxiety, hydradenitis suppurativa, and migraines.      10/30/2024 EGD:  Mucosal changes within the esophagus characterized by longitudinal furrows involving proximal, mid, and distal esophagus suspicious for EOE  Distal esophageal sampling: Increased intraepithelial eosinophils measuring up to 4 per high-powered field  Proximal sampling: Increased eosinophils measuring up to 25 per hpf  Gastritis involving gastric body and antrum characterized by moderate inflammation with erosions and linear erosions  Endoscopically normal-appearing duodenum  LA grade B esophagitis     Past Surgical History:  2018    2018 tubal ligation     Social History:   Former tobacco user    Denies e-cigarrette,  alcohol or drug use      Family history:  Denies known family history of colon cancer, colon polyps, or IBD     History of Present Illness  The patient presents for evaluation of pharyngeal discomfort.    Pharyngeal Discomfort with increased eructation   - The patient reports experiencing odynophagia during meals for the past two weeks, with symptoms occurring intermittently and postprandially without identifiable trigger.  - They deny any associated nausea, vomiting, or abnormal bowel movements.  - The patient is currently taking Voquezna 10mg  and omeprazole, which were reintroduced in 2025 following issues with medication refills, but have noted no improvement in  symptoms.  - Denies abdominal pain    Bowel movements:  - Are described as occurring regularly without evidence of melena or hematochezia.  Denies nocturnal bowel movements or unintentional weight loss.    Seasonal Allergic Rhinitis  - The patient reports seasonal allergic rhinitis characterized by nasal congestion and postnasal drip.  - They are not currently using intranasal corticosteroid therapy, specifically Flonase.    Elevated liver enzymes  -Denies prior liver serologies evaluation or recent abdominal imaging.  Agreeable to proceed.  -Denies new medications or supplements since last assessment of enzymes.  Denies high risk behaviors or increased consumption of alcohol.       Previous treatment of IBS-C:  Linzess 290 mcg- not efficacious   Trulance 3 mg   Initiated 11/12/2024; patient discontinued after bowel habits improved in frequency without noticeable change upon stopping Trulance regimen.     Previous/current tx GERD:  Current:  Voquezna 20 mg, then transisitioned to 10 mg   Medications    Current Outpatient Medications:     amoxicillin (AMOXIL) 500 MG capsule, Take 1 capsule by mouth 3 (Three) Times a Day., Disp: , Rfl:     busPIRone (BUSPAR) 5 MG tablet, Take 1 tablet by mouth Every Night. May also take 1 tablet 2 (Two) Times a Day As Needed (anxiety)., Disp: 90 tablet, Rfl: 1    Dupilumab (Dupixent) 300 MG/2ML solution prefilled syringe, Inject 2 mL under the skin into the appropriate area as directed 1 (One) Time Per Week., Disp: 8 mL, Rfl: 11    ketorolac (TORADOL) 10 MG tablet, Take 1 tablet by mouth Every 6 (Six) Hours As Needed (Migraine headache)., Disp: 15 tablet, Rfl: 5    ondansetron ODT (ZOFRAN-ODT) 4 MG disintegrating tablet, Place 1 tablet on the tongue Every 6 (Six) Hours As Needed for Nausea or Vomiting., Disp: 20 tablet, Rfl: 5    sertraline (ZOLOFT) 100 MG tablet, Take 1 tablet by mouth once daily, Disp: 90 tablet, Rfl: 0    SUMAtriptan (Imitrex) 100 MG tablet, Take one tablet at  "onset of headache. May repeat dose one time in 2 hours if headache not relieved., Disp: 10 tablet, Rfl: 5    topiramate (TOPAMAX) 50 MG tablet, Take 1 tablet by mouth 2 (Two) Times a Day., Disp: 180 tablet, Rfl: 1    VITAMIN D PO, Take  by mouth. Buys OTC, Disp: , Rfl:     Vonoprazan Fumarate (Voquezna) 10 MG tablet, Take 1 tablet by mouth Daily., Disp: 90 tablet, Rfl: 1    fluticasone (FLONASE) 50 MCG/ACT nasal spray, Administer 2 sprays into the nostril(s) as directed by provider Every Night., Disp: 11.1 g, Rfl: 1  Result Review :      Common labs          5/21/2024    15:46 7/11/2024    13:10 1/24/2025    08:05   Common Labs   Glucose   74    BUN   8    Creatinine   0.88    Sodium   138    Potassium   4.1    Chloride   102    Calcium   10.0    Albumin   4.7    Total Bilirubin   0.4    Alkaline Phosphatase   90    AST (SGOT)   54    ALT (SGPT)   70    WBC 8.49      7.0    Hemoglobin 14.0      13.7    Hematocrit 43.2      41.9    Platelets 231      278    Total Cholesterol  191  193    Triglycerides  77  111    HDL Cholesterol  48  59    LDL Cholesterol   129  114       Details          This result is from an external source.           Office Visit with Jessy Gr APRN (02/10/2025)    Liver (05/07/2024 14:35)   Cholelithiasis without evidence of acute cholecystitis  Negative for biliary ductal dilatation  Vital Signs:   /72 (BP Location: Left arm, Patient Position: Sitting, Cuff Size: Adult)   Ht 144.8 cm (57.01\")   Wt 60.7 kg (133 lb 12.8 oz)   BMI 28.95 kg/m²     Body mass index is 28.95 kg/m².      Physical Exam  Assessment and Plan    Diagnoses and all orders for this visit:    1. Elevated LFTs (Primary)  -     Alpha - 1 - Antitrypsin  -     Anti-microsomal Antibody  -     Anti-Smooth Muscle Antibody Titer  -     CBC & Differential  -     Comprehensive Metabolic Panel  -     Ferritin  -     Celiac Disease Panel  -     Ceruloplasmin  -     Gamma GT  -     Hepatitis Panel, Acute  -     IgG, IgA, " IgM  -     Iron Profile  -     Mitochondrial Antibodies, M2  -     MICHAEL by IFA, Reflex to Titer and Pattern  -     US Abdomen Limited; Future    2. Eructation    3. Eosinophilic esophagitis  -     fluticasone (FLONASE) 50 MCG/ACT nasal spray; Administer 2 sprays into the nostril(s) as directed by provider Every Night.  Dispense: 11.1 g; Refill: 1       Assessment & Plan  Odynophagia and eructation:  -Patient does report new onset postnasal drainage that is common during season changes at time of onset of odynophagia and eructation.  I suspect a component as frequent throat clearing of postnasal drainage can contribute to air swallowing and eructation.  -- Saline nasal sprays twice daily  Start Flonase; prescription provided, advised OTC if not covered in hopes of providing symptomatic relief if underlying cause to current symptoms  - Discontinue omeprazole  - Start Voquezna 20 mg for 2 weeks    EOE:  - She is agreeable to continuing with Dupixent 300 mg once weekly as this has been helpful in improving dysphagia symptoms.  Reports tolerating injectable therapy well without issue.  Denies injection site irritation.    Elevated liver enzymes  -Liver enzymes remain elevated and she has not previously underwent liver serologies testing.  Agreeable to completing at time of today's visit.  Will also obtain right upper quadrant abdominal ultrasound to assess for structural etiology and ensure absence of acute cholecystitis.      Follow up:  4 weeks.     Patient is agreeable to the outlined above treatment plan.  Verbalizes understanding and will contact office for any new or worsening concerns.  All questions answered and support provided.  Patient Instructions   Blood work today     Once we receive these results, our office will contact you to discuss updating your treatment plan as indicated      Scheduling of your Ordered Diagnostic Tests :    A team member from [facility] scheduling department will contact you to  schedule your tests.    You can also contact them directly at [phone]       Stop 10 mg dosing of Voquenza and start 20 mg dosing x 15 days-samples provided at time of today's visit    Stop taking omeprazole    Start inserting 2 sprays of Flonase in each nare as well as inserting saline nasal flush twice daily to see if postnasal drainage is contributing to current symptoms.    Continue with Dupixent as scheduled     EMR Dragon/Transcription Disclaimer:  This document has been Dictated utilizing Dragon dictation.     Jessy Gr, MSN, APRN, FNP-C   [facility]  Gastroenterology   [address] [facility]  [place] [place], [place] [id number] [phone] office  [phone] fax

## 2025-05-01 NOTE — TELEPHONE ENCOUNTER
Rx Refill Note  Requested Prescriptions     Pending Prescriptions Disp Refills    vitamin D (ERGOCALCIFEROL) 1.25 MG (91971 UT) capsule capsule 30 capsule      Sig: Take 1 capsule by mouth 2 (Two) Times a Week.      Last office visit with prescribing clinician: 2/3/2025   Last telemedicine visit with prescribing clinician: Visit date not found   Next office visit with prescribing clinician: 8/12/2025                         Would you like a call back once the refill request has been completed: [] Yes [] No    If the office needs to give you a call back, can they leave a voicemail: [] Yes [] No    Nicolle Gibson MA  05/01/25, 09:42 EDT

## 2025-05-01 NOTE — TELEPHONE ENCOUNTER
Spoke with pharmacy, insurance is not wanting to pay for 4 syringes of dupixent per 28 days. They are wanting patient to try 2 every 28 days. Will need PA.

## 2025-05-02 LAB
ANA SER QL IF: NEGATIVE
LKM-1 AB SER-ACNC: 4.1 UNITS (ref 0–20)
MITOCHONDRIA M2 IGG SER-ACNC: <20 UNITS (ref 0–20)
SMA IGG SER-ACNC: 20 UNITS (ref 0–19)

## 2025-05-04 RX ORDER — ERGOCALCIFEROL 1.25 MG/1
50000 CAPSULE, LIQUID FILLED ORAL 2 TIMES WEEKLY
Qty: 30 CAPSULE | OUTPATIENT
Start: 2025-05-05

## 2025-05-05 LAB
ENDOMYSIUM IGA SER QL: NEGATIVE
IGA SERPL-MCNC: 161 MG/DL (ref 87–352)
TTG IGA SER-ACNC: <2 U/ML (ref 0–3)

## 2025-05-12 DIAGNOSIS — E55.9 VITAMIN D DEFICIENCY: ICD-10-CM

## 2025-05-12 DIAGNOSIS — K21.00 GASTROESOPHAGEAL REFLUX DISEASE WITH ESOPHAGITIS WITHOUT HEMORRHAGE: ICD-10-CM

## 2025-05-12 DIAGNOSIS — K20.0 EOSINOPHILIC ESOPHAGITIS: ICD-10-CM

## 2025-05-12 RX ORDER — VONOPRAZAN FUMARATE 13.36 MG/1
10 TABLET ORAL DAILY
Qty: 90 TABLET | Refills: 1 | Status: SHIPPED | OUTPATIENT
Start: 2025-05-12

## 2025-05-13 RX ORDER — SERTRALINE HYDROCHLORIDE 100 MG/1
100 TABLET, FILM COATED ORAL DAILY
Qty: 90 TABLET | Refills: 0 | Status: SHIPPED | OUTPATIENT
Start: 2025-05-13

## 2025-05-13 RX ORDER — ERGOCALCIFEROL 1.25 MG/1
50000 CAPSULE, LIQUID FILLED ORAL 2 TIMES WEEKLY
Qty: 24 CAPSULE | Refills: 0 | OUTPATIENT
Start: 2025-05-15

## 2025-05-20 DIAGNOSIS — F41.9 ANXIETY: ICD-10-CM

## 2025-05-21 NOTE — TELEPHONE ENCOUNTER
Rx Refill Note  Requested Prescriptions     Pending Prescriptions Disp Refills    busPIRone (BUSPAR) 5 MG tablet [Pharmacy Med Name: busPIRone HCl 5 MG Oral Tablet] 90 tablet 0     Sig: TAKE 1 TABLET BY MOUTH IN THE EVENING.  MAY ALSO TAKE 1 TABLET TWO TIMES A DAY AS NEEDED FOR ANXIETY      Last office visit with prescribing clinician: 2/3/2025   Last telemedicine visit with prescribing clinician: Visit date not found   Next office visit with prescribing clinician: 8/12/2025                         Would you like a call back once the refill request has been completed: [] Yes [] No    If the office needs to give you a call back, can they leave a voicemail: [] Yes [] No    Zita Ramos MA  05/21/25, 07:43 EDT

## 2025-05-22 RX ORDER — BUSPIRONE HYDROCHLORIDE 5 MG/1
TABLET ORAL
Qty: 90 TABLET | Refills: 0 | Status: SHIPPED | OUTPATIENT
Start: 2025-05-22

## 2025-05-23 ENCOUNTER — HOSPITAL ENCOUNTER (OUTPATIENT)
Dept: ULTRASOUND IMAGING | Facility: HOSPITAL | Age: 30
Discharge: HOME | End: 2025-05-23
Payer: COMMERCIAL

## 2025-05-23 DIAGNOSIS — R79.89 ELEVATED LFTS: ICD-10-CM

## 2025-05-23 PROCEDURE — 76705 ECHO EXAM OF ABDOMEN: CPT

## 2025-05-25 DIAGNOSIS — K20.0 EOSINOPHILIC ESOPHAGITIS: ICD-10-CM

## 2025-05-27 ENCOUNTER — LAB (OUTPATIENT)
Dept: LAB | Facility: HOSPITAL | Age: 30
End: 2025-05-27
Payer: COMMERCIAL

## 2025-05-27 ENCOUNTER — OFFICE VISIT (OUTPATIENT)
Dept: GASTROENTEROLOGY | Facility: CLINIC | Age: 30
End: 2025-05-27
Payer: COMMERCIAL

## 2025-05-27 VITALS
BODY MASS INDEX: 28.43 KG/M2 | WEIGHT: 131.8 LBS | HEIGHT: 57 IN | DIASTOLIC BLOOD PRESSURE: 60 MMHG | SYSTOLIC BLOOD PRESSURE: 112 MMHG

## 2025-05-27 DIAGNOSIS — R79.89 ELEVATED LFTS: ICD-10-CM

## 2025-05-27 DIAGNOSIS — K20.0 EOSINOPHILIC ESOPHAGITIS: ICD-10-CM

## 2025-05-27 DIAGNOSIS — R35.0 FREQUENT URINATION: Primary | ICD-10-CM

## 2025-05-27 DIAGNOSIS — R93.5 ABNORMAL US (ULTRASOUND) OF ABDOMEN: ICD-10-CM

## 2025-05-27 DIAGNOSIS — R10.30 LOWER ABDOMINAL PAIN: ICD-10-CM

## 2025-05-27 LAB
BACTERIA UR QL AUTO: ABNORMAL /HPF
BILIRUB UR QL STRIP: NEGATIVE
CLARITY UR: ABNORMAL
COLOR UR: YELLOW
GLUCOSE UR STRIP-MCNC: NEGATIVE MG/DL
HGB UR QL STRIP.AUTO: NEGATIVE
HOLD SPECIMEN: NORMAL
HYALINE CASTS UR QL AUTO: ABNORMAL /LPF
KETONES UR QL STRIP: NEGATIVE
LEUKOCYTE ESTERASE UR QL STRIP.AUTO: ABNORMAL
NITRITE UR QL STRIP: NEGATIVE
PH UR STRIP.AUTO: 7 [PH] (ref 5–8)
PROT UR QL STRIP: NEGATIVE
RBC # UR STRIP: ABNORMAL /HPF
REF LAB TEST METHOD: ABNORMAL
SP GR UR STRIP: 1.01 (ref 1–1.03)
SQUAMOUS #/AREA URNS HPF: ABNORMAL /HPF
UROBILINOGEN UR QL STRIP: ABNORMAL
WBC # UR STRIP: ABNORMAL /HPF

## 2025-05-27 PROCEDURE — 99214 OFFICE O/P EST MOD 30 MIN: CPT

## 2025-05-27 PROCEDURE — 87086 URINE CULTURE/COLONY COUNT: CPT

## 2025-05-27 PROCEDURE — 81001 URINALYSIS AUTO W/SCOPE: CPT

## 2025-05-27 RX ORDER — FLUTICASONE PROPIONATE 50 MCG
2 SPRAY, SUSPENSION (ML) NASAL NIGHTLY
Qty: 11.1 G | Refills: 1 | Status: SHIPPED | OUTPATIENT
Start: 2025-05-27

## 2025-05-27 NOTE — PATIENT INSTRUCTIONS
Urine analysis today to further assess abdominal pain    Scheduling of your Ordered Diagnostic Tests- MRI of abdomen to further assess liver enzyme increase  :    A team member from [facility] scheduling department will contact you to schedule your tests.    You can also contact them directly at [phone]      Continue voquezna 10 mg daily and flonase

## 2025-05-27 NOTE — PROGRESS NOTES
Patient or patient representative verbalized consent for the use of Ambient Listening during the visit with  SARITA Soliz for chart documentation. 2025  13:31 EDT    Chief Complaint   Patient presents with    Abdominal Pain    Heartburn    Nausea         Patient is a 29 y.o. who presents to the office for follow-up evaluation of EOE.  Last in office visit completed on 2025.  Patient has a significant past medical history of EOE initially diagnosed during EGD evaluation with Dr. Barnett 2023, constipation, anemia, anxiety, hydradenitis suppurativa, and migraines.  History of elevated liver enzymes-liver serologies negative and LFTs normalized.     10/30/2024 EGD:  Mucosal changes within the esophagus characterized by longitudinal furrows involving proximal, mid, and distal esophagus suspicious for EOE  Distal esophageal sampling: Increased intraepithelial eosinophils measuring up to 4 per high-powered field  Proximal sampling: Increased eosinophils measuring up to 25 per hpf  Gastritis involving gastric body and antrum characterized by moderate inflammation with erosions and linear erosions  Endoscopically normal-appearing duodenum  LA grade B esophagitis     Past Surgical History:  2018    2018 tubal ligation     Social History:   Former tobacco user    Denies e-cigarrette,  alcohol or drug use      Family history:  Denies known family history of colon cancer, colon polyps, or IBD    History of Present Illness  The patient presents for evaluation of abdominal pain, hemorrhoid, and elevated liver enzymes.    Abdominal Pain  - Severe intermittent lower abdominal pain described as sharp in sensation over the past few days, occurring randomly 12 to 15 times daily, not associated with food intake or bowel movements  - Dorsalgia localized to the renal area  - Constant lower abdominal pain, originating in the back and encircling the abdomen below the umbilicus, described as sharp and  spasmodic, similar to contractions, alleviated by eating  - Reports increased urinary frequency without dysuria, hematuria, or incontinence.  Denies fever or chills.    Hemorrhoid  - Experienced gastroenteritis on 05/11/2025, characterized by vomiting and diarrhea  - Developed a hemorrhoid on 05/20/2025, which has since resolved without pain  - No hematochezia or melena  - Bowel movements 2 to 3 times daily, complete and formed, with no urgency  - No hematuria, fever, or chills    EOE/GERD  -The patient is using Fluticasone nasal spray (Flonase) with symptom improvement and requests a refill.   -The last dose of Dupilumab (Dupixent) was administered on Thursday, with the patient reporting no issues and doing well.  - She continues Voquezna 10 mg daily without noticeable change impact on symptoms after trial of 20 mg dosing.  Denies dysphagia.  Denies subsequent nausea or vomiting after above-mentioned acute episode.    SOCIAL HISTORY  - Marital Status:        Previous treatment of IBS-C:  Linzess 290 mcg- not efficacious   Trulance 3 mg   Initiated 11/12/2024; patient discontinued after bowel habits improved in frequency without noticeable change upon stopping Trulance regimen.     Previous/current tx GERD:  Current:  Voquezna 20 mg, then transisitioned to 10 mg       Medications    Current Outpatient Medications:     busPIRone (BUSPAR) 5 MG tablet, TAKE 1 TABLET BY MOUTH IN THE EVENING.  MAY ALSO TAKE 1 TABLET TWO TIMES A DAY AS NEEDED FOR ANXIETY, Disp: 90 tablet, Rfl: 0    Dupilumab (Dupixent) 300 MG/2ML solution prefilled syringe, Inject 2 mL under the skin into the appropriate area as directed 1 (One) Time Per Week., Disp: 8 mL, Rfl: 11    fluticasone (FLONASE) 50 MCG/ACT nasal spray, Administer 2 sprays into the nostril(s) as directed by provider Every Night., Disp: 11.1 g, Rfl: 1    ketorolac (TORADOL) 10 MG tablet, Take 1 tablet by mouth Every 6 (Six) Hours As Needed (Migraine headache)., Disp: 15  "tablet, Rfl: 5    ondansetron ODT (ZOFRAN-ODT) 4 MG disintegrating tablet, Place 1 tablet on the tongue Every 6 (Six) Hours As Needed for Nausea or Vomiting., Disp: 20 tablet, Rfl: 5    sertraline (ZOLOFT) 100 MG tablet, Take 1 tablet by mouth once daily, Disp: 90 tablet, Rfl: 0    SUMAtriptan (Imitrex) 100 MG tablet, Take one tablet at onset of headache. May repeat dose one time in 2 hours if headache not relieved., Disp: 10 tablet, Rfl: 5    topiramate (TOPAMAX) 50 MG tablet, Take 1 tablet by mouth 2 (Two) Times a Day., Disp: 180 tablet, Rfl: 1    VITAMIN D PO, Take  by mouth. Buys OTC, Disp: , Rfl:     Vonoprazan Fumarate (Voquezna) 10 MG tablet, Take 1 tablet by mouth Daily., Disp: 90 tablet, Rfl: 1  Result Review :      Common labs          7/11/2024    13:10 1/24/2025    08:05 5/1/2025    10:11   Common Labs   Glucose  74  89    BUN  8  9    Creatinine  0.88  1.06    Sodium  138  137    Potassium  4.1  4.1    Chloride  102  107    Calcium  10.0  10.0    Albumin  4.7  4.7    Total Bilirubin  0.4  0.2    Alkaline Phosphatase  90  94    AST (SGOT)  54  23    ALT (SGPT)  70  14    WBC  7.0  8.61    Hemoglobin  13.7  14.6    Hematocrit  41.9  45.6    Platelets  278  283    Total Cholesterol 191  193     Triglycerides 77  111     HDL Cholesterol 48  59     LDL Cholesterol  129  114     Office Visit with Jessy Gr APRN (05/01/2025)   US Abdomen Limited (05/23/2025 14:03)   Cholelithiasis without evidence of acute cholecystitis   CBD measured at 0.24 mm   Vital Signs:   /60 (BP Location: Left arm, Patient Position: Sitting, Cuff Size: Adult)   Ht 144.8 cm (57.01\")   Wt 59.8 kg (131 lb 12.8 oz)   BMI 28.51 kg/m²     Body mass index is 28.51 kg/m².      Physical Exam  Constitutional:       General: She is not in acute distress.     Appearance: Normal appearance. She is not ill-appearing.   HENT:      Head: Normocephalic.   Eyes:      General: No scleral icterus.  Pulmonary:      Effort: No respiratory " distress.   Abdominal:      General: Abdomen is flat. Bowel sounds are normal. There is no distension.      Palpations: Abdomen is soft. There is no mass.      Tenderness: There is no abdominal tenderness. There is no guarding or rebound.      Hernia: No hernia is present.   Skin:     General: Skin is warm and dry.   Neurological:      Mental Status: She is alert.      Gait: Gait normal.   Psychiatric:         Mood and Affect: Mood normal.       Assessment and Plan    Diagnoses and all orders for this visit:    1. Frequent urination (Primary)  -     Urinalysis With Culture If Indicated - Urine, Clean Catch  -     Dewar Urine Culture Tube - Urine, Clean Catch    2. Lower abdominal pain    3. Eosinophilic esophagitis    4. Elevated LFTs  -     Cancel: MRI abdomen w wo contrast mrcp; Future    5. Abnormal US (ultrasound) of abdomen  -     Cancel: MRI abdomen w wo contrast mrcp; Future       Assessment & Plan  Abdominal pain:  - Liver enzymes normalized  - Ultrasound: Gallstone without evidence of acute cholecystitis, gallbladder wall thickening, or pericholecystic fluid collection.  - Conduct urine test to assess for urinary etiology to abdominal pain; if negative and symptoms persist agreeable to completing follow-up with primary care provider for further evaluation.  - Seek immediate medical attention for severe right upper abdominal pain radiating to back after eating, nausea, vomiting, or severe abdominal pain    Hemorrhoid:  - Likely from acute GI illness causing increased stool output and rectal irritation  - Resolved as stools normalized  - Inform if hemorrhoid recurs or new concerns arise    EOE:  - Improvement with Flonase in combination with  - Continue Dupixent and Flonase  - Maintain Voquezna 10 mg    Elevated liver enzymes:  - Liver enzymes normalized  - Ultrasound: Gallstone, enlarged common bile duct  - Order MRI for further investigation  - Seek immediate medical attention for severe symptoms  Follow  up:  8  weeks or sooner for any new or worsening G concerns     Patient is agreeable to the outlined above treatment plan.  Verbalizes understanding and will contact office for any new or worsening concerns.  All questions answered and support provided.  Patient Instructions   Urine analysis today to further assess abdominal pain    Scheduling of your Ordered Diagnostic Tests- MRI of abdomen to further assess liver enzyme increase  :    A team member from [facility] scheduling department will contact you to schedule your tests.    You can also contact them directly at [phone]      Continue voquezna 10 mg daily and flonase    EMR Dragon/Transcription Disclaimer:  This document has been Dictated utilizing Dragon dictation.     Jessy Gr, MSN, APRN, FNP-C   [facility]  Gastroenterology   [address] [id number] [phone] office  [phone] fax

## 2025-05-29 LAB — BACTERIA SPEC AEROBE CULT: NO GROWTH

## 2025-06-10 ENCOUNTER — TELEPHONE (OUTPATIENT)
Dept: GASTROENTEROLOGY | Facility: CLINIC | Age: 30
End: 2025-06-10
Payer: COMMERCIAL

## 2025-06-12 DIAGNOSIS — F41.9 ANXIETY: ICD-10-CM

## 2025-06-12 RX ORDER — BUSPIRONE HYDROCHLORIDE 5 MG/1
TABLET ORAL
Qty: 90 TABLET | Refills: 0 | Status: SHIPPED | OUTPATIENT
Start: 2025-06-12

## 2025-06-16 DIAGNOSIS — K20.0 EOSINOPHILIC ESOPHAGITIS: ICD-10-CM

## 2025-06-16 RX ORDER — FLUTICASONE PROPIONATE 50 MCG
2 SPRAY, SUSPENSION (ML) NASAL NIGHTLY
Qty: 11.1 G | Refills: 1 | OUTPATIENT
Start: 2025-06-16

## 2025-06-16 NOTE — TELEPHONE ENCOUNTER
Requested additional refills be obtained from primary care provider for continued management or purchased over-the-counter for continued use.

## 2025-06-17 RX ORDER — SERTRALINE HYDROCHLORIDE 100 MG/1
100 TABLET, FILM COATED ORAL DAILY
Qty: 90 TABLET | Refills: 0 | OUTPATIENT
Start: 2025-06-17

## 2025-07-03 DIAGNOSIS — E55.9 VITAMIN D DEFICIENCY: ICD-10-CM

## 2025-07-03 RX ORDER — ERGOCALCIFEROL 1.25 MG/1
50000 CAPSULE, LIQUID FILLED ORAL 2 TIMES WEEKLY
Qty: 24 CAPSULE | Refills: 0 | OUTPATIENT
Start: 2025-07-03

## 2025-07-15 DIAGNOSIS — F41.9 ANXIETY: ICD-10-CM

## 2025-07-16 ENCOUNTER — HOSPITAL ENCOUNTER (EMERGENCY)
Facility: HOSPITAL | Age: 30
Discharge: HOME | End: 2025-07-16
Attending: EMERGENCY MEDICINE | Admitting: EMERGENCY MEDICINE
Payer: COMMERCIAL

## 2025-07-16 VITALS
WEIGHT: 140 LBS | DIASTOLIC BLOOD PRESSURE: 77 MMHG | RESPIRATION RATE: 16 BRPM | TEMPERATURE: 97.7 F | SYSTOLIC BLOOD PRESSURE: 101 MMHG | HEART RATE: 78 BPM | OXYGEN SATURATION: 100 % | HEIGHT: 57 IN | BODY MASS INDEX: 30.2 KG/M2

## 2025-07-16 DIAGNOSIS — G43.809 OTHER MIGRAINE WITHOUT STATUS MIGRAINOSUS, NOT INTRACTABLE: Primary | ICD-10-CM

## 2025-07-16 PROCEDURE — 99284 EMERGENCY DEPT VISIT MOD MDM: CPT | Performed by: EMERGENCY MEDICINE

## 2025-07-16 PROCEDURE — 25010000002 KETOROLAC TROMETHAMINE PER 15 MG: Performed by: EMERGENCY MEDICINE

## 2025-07-16 PROCEDURE — 25010000002 METHYLPREDNISOLONE PER 125 MG: Performed by: EMERGENCY MEDICINE

## 2025-07-16 PROCEDURE — 25810000003 SODIUM CHLORIDE 0.9 % SOLUTION: Performed by: EMERGENCY MEDICINE

## 2025-07-16 PROCEDURE — 99282 EMERGENCY DEPT VISIT SF MDM: CPT

## 2025-07-16 PROCEDURE — 96375 TX/PRO/DX INJ NEW DRUG ADDON: CPT

## 2025-07-16 PROCEDURE — 96374 THER/PROPH/DIAG INJ IV PUSH: CPT

## 2025-07-16 PROCEDURE — 25010000002 METOCLOPRAMIDE PER 10 MG: Performed by: EMERGENCY MEDICINE

## 2025-07-16 PROCEDURE — 96361 HYDRATE IV INFUSION ADD-ON: CPT

## 2025-07-16 RX ORDER — METOCLOPRAMIDE HYDROCHLORIDE 5 MG/ML
10 INJECTION INTRAMUSCULAR; INTRAVENOUS ONCE
Status: COMPLETED | OUTPATIENT
Start: 2025-07-16 | End: 2025-07-16

## 2025-07-16 RX ORDER — BUSPIRONE HYDROCHLORIDE 5 MG/1
TABLET ORAL
Qty: 90 TABLET | Refills: 0 | OUTPATIENT
Start: 2025-07-16

## 2025-07-16 RX ORDER — METHYLPREDNISOLONE SODIUM SUCCINATE 125 MG/2ML
125 INJECTION, POWDER, LYOPHILIZED, FOR SOLUTION INTRAMUSCULAR; INTRAVENOUS ONCE
Status: COMPLETED | OUTPATIENT
Start: 2025-07-16 | End: 2025-07-16

## 2025-07-16 RX ORDER — KETOROLAC TROMETHAMINE 30 MG/ML
30 INJECTION, SOLUTION INTRAMUSCULAR; INTRAVENOUS ONCE
Status: COMPLETED | OUTPATIENT
Start: 2025-07-16 | End: 2025-07-16

## 2025-07-16 RX ADMIN — KETOROLAC TROMETHAMINE 30 MG: 30 INJECTION INTRAMUSCULAR; INTRAVENOUS at 03:59

## 2025-07-16 RX ADMIN — METOCLOPRAMIDE 10 MG: 5 INJECTION, SOLUTION INTRAMUSCULAR; INTRAVENOUS at 03:58

## 2025-07-16 RX ADMIN — SODIUM CHLORIDE 1000 ML: 9 INJECTION, SOLUTION INTRAVENOUS at 03:55

## 2025-07-16 RX ADMIN — METHYLPREDNISOLONE SODIUM SUCCINATE 125 MG: 125 INJECTION, POWDER, FOR SOLUTION INTRAMUSCULAR; INTRAVENOUS at 03:58

## 2025-07-16 NOTE — FSED PROVIDER NOTE
Subjective   History of Present Illness  29-year-old female patient with chief complaint of migraine headache.  Patient admits to chronic and recurrent history of migraine headache.  Patient states she has migraine headaches on a weekly basis.  Patient takes sumatriptan and topiramate for her migraine headache.  Patient drove here.  Admits to photophobia and phonophobia.  Admits to nausea and vomiting.  Patient states her tubes have been removed.  Patient states she cannot get pregnant.  Patient states her symptoms are consistent with her migraine headaches in the past.      Review of Systems   Gastrointestinal:  Positive for vomiting.   Neurological:  Positive for headaches.   All other systems reviewed and are negative.      Past Medical History:   Diagnosis Date   • Anemia    • Anxiety    • Depression    • GERD (gastroesophageal reflux disease)    • Hidradenitis suppurativa 2023   • Migraines    • Obsessive-compulsive disorder        No Known Allergies    Past Surgical History:   Procedure Laterality Date   •  SECTION     • ENDOSCOPY N/A 10/30/2024    Procedure: ESOPHAGOGASTRODUODENOSCOPY WITH BIOPSY;  Surgeon: Rip Denny MD;  Location: [facility] OR;  Service: Gastroenterology;  Laterality: N/A;  gastric bx; distal esophagus bx; proximal esophagus bx; gastritis; abnormal mucosa of esophagus   • EXCISION LESION N/A 2023    Procedure: EXCISION LESION from scalp;  Surgeon: Solis Paris MD;  Location: [facility] OR;  Service: General;  Laterality: N/A;   • TEETH EXTRACTION  2019    All top teeth removed- wears dentures   • TUBAL ABDOMINAL LIGATION  2018       Family History   Problem Relation Age of Onset   • Anxiety disorder Mother    • Heart disease Father    • Hypertension Father    • Diverticulitis Father    • Autism Sister    • No Known Problems Daughter    • No Known Problems Son    • Diabetes Paternal Uncle    • Asthma Paternal Uncle    • Learning  disabilities Sister    • Cancer Paternal Grandmother         Leukemia       Social History     Socioeconomic History   • Marital status:    Tobacco Use   • Smoking status: Former     Current packs/day: 0.00     Average packs/day: 0.2 packs/day for 1 year (0.3 ttl pk-yrs)     Types: Cigarettes     Start date: 1/1/2014     Quit date: 12/1/2014     Years since quitting: 10.6     Passive exposure: Past   • Smokeless tobacco: Never   Vaping Use   • Vaping status: Never Used   Substance and Sexual Activity   • Alcohol use: Not Currently   • Drug use: Never   • Sexual activity: Yes     Partners: Male     Birth control/protection: Tubal ligation           Objective   Physical Exam  Vitals and nursing note reviewed.   Constitutional:       General: She is not in acute distress.     Appearance: Normal appearance. She is not toxic-appearing.   HENT:      Head: Normocephalic and atraumatic.      Right Ear: Tympanic membrane normal.      Left Ear: Tympanic membrane normal.      Nose: Nose normal.      Mouth/Throat:      Mouth: Mucous membranes are moist.   Eyes:      Extraocular Movements: Extraocular movements intact.      Conjunctiva/sclera: Conjunctivae normal.   Cardiovascular:      Rate and Rhythm: Normal rate and regular rhythm.      Heart sounds: Normal heart sounds.   Pulmonary:      Breath sounds: Normal breath sounds.   Abdominal:      Palpations: Abdomen is soft.      Tenderness: There is no abdominal tenderness. There is no guarding or rebound.   Musculoskeletal:         General: Normal range of motion.      Cervical back: Normal range of motion. No tenderness.   Skin:     General: Skin is warm.   Neurological:      General: No focal deficit present.      Mental Status: She is alert and oriented to person, place, and time.   Psychiatric:         Mood and Affect: Mood normal.         Behavior: Behavior normal.         Judgment: Judgment normal.       Procedures           ED Course                                            Medical Decision Making  No focal deficits observed.  On reevaluation at 4:30 AM patient is resting comfortably at bedside.  Patient states her headache has significantly improved.  Patient is requesting to be discharged.    Risk  Prescription drug management.        Final diagnoses:   None       ED Disposition  ED Disposition       None            No follow-up provider specified.       Medication List      No changes were made to your prescriptions during this visit.

## 2025-07-30 ENCOUNTER — TELEPHONE (OUTPATIENT)
Dept: INTERNAL MEDICINE | Facility: CLINIC | Age: 30
End: 2025-07-30
Payer: COMMERCIAL

## 2025-07-31 DIAGNOSIS — E55.9 VITAMIN D DEFICIENCY: Primary | ICD-10-CM

## 2025-07-31 DIAGNOSIS — G43.809 OTHER MIGRAINE WITHOUT STATUS MIGRAINOSUS, NOT INTRACTABLE: ICD-10-CM

## 2025-07-31 DIAGNOSIS — R79.89 ELEVATED LFTS: ICD-10-CM

## 2025-07-31 DIAGNOSIS — D50.8 IRON DEFICIENCY ANEMIA SECONDARY TO INADEQUATE DIETARY IRON INTAKE: ICD-10-CM

## 2025-08-15 DIAGNOSIS — E55.9 VITAMIN D DEFICIENCY: ICD-10-CM

## 2025-08-15 RX ORDER — ERGOCALCIFEROL 1.25 MG/1
50000 CAPSULE, LIQUID FILLED ORAL 2 TIMES WEEKLY
Qty: 24 CAPSULE | Refills: 0 | OUTPATIENT
Start: 2025-08-18

## (undated) DEVICE — TBG PENCL TELESCP MEGADYNE SMOKE EVAC 15FT

## (undated) DEVICE — SLV SCD CALF HEMOFORCE DVT THERP REPROC MD

## (undated) DEVICE — ANTIBACTERIAL UNDYED BRAIDED (POLYGLACTIN 910), SYNTHETIC ABSORBABLE SUTURE: Brand: COATED VICRYL

## (undated) DEVICE — FRCP BX RADJAW4 NDL 2.8 240CM LG OG BX40

## (undated) DEVICE — KT ORCA ORCAPOD DISP STRL

## (undated) DEVICE — BW-412T DISP COMBO CLEANING BRUSH: Brand: SINGLE USE COMBINATION CLEANING BRUSH

## (undated) DEVICE — CUFF SCD HEMOFORCE SEQ CALF STD MD

## (undated) DEVICE — UNDERGLV SURG BIOGEL INDICAT PF 8 GRN

## (undated) DEVICE — KT SURG TURNOVER 050

## (undated) DEVICE — GLV SURG BIOGEL LTX PF 7 1/2

## (undated) DEVICE — THE BITE BLOCK MAXI, LATEX FREE STRAP IS USED TO PROTECT THE ENDOSCOPE INSERTION TUBE FROM BEING BITTEN BY THE PATIENT.

## (undated) DEVICE — Device

## (undated) DEVICE — SOL IRR H2O BTL 1000ML STRL

## (undated) DEVICE — SYR LL W/SCALE/MARK 3ML STRL

## (undated) DEVICE — VIAL FORMALIN CAP 10P 40ML

## (undated) DEVICE — ADAPT CLN BIOGUARD AIR/H2O DISP

## (undated) DEVICE — LINER SURG CANSTR SXN S/RIGD 1500CC

## (undated) DEVICE — SUT PROLN 3/0 8832H

## (undated) DEVICE — GAUZE,SPONGE,FLUFF,6"X6.75",STRL,5/TRAY: Brand: MEDLINE

## (undated) DEVICE — THE STERILE LIGHT HANDLE COVER IS USED WITH STERIS SURGICAL LIGHTING AND VISUALIZATION SYSTEMS.

## (undated) DEVICE — PK MINOR LAPAROTOMY 50